# Patient Record
Sex: MALE | Race: ASIAN | Employment: OTHER | URBAN - METROPOLITAN AREA
[De-identification: names, ages, dates, MRNs, and addresses within clinical notes are randomized per-mention and may not be internally consistent; named-entity substitution may affect disease eponyms.]

---

## 2017-07-31 ENCOUNTER — LAB REQUISITION (OUTPATIENT)
Dept: LAB | Facility: HOSPITAL | Age: 62
End: 2017-07-31
Payer: COMMERCIAL

## 2017-07-31 DIAGNOSIS — E11.9 TYPE 2 DIABETES MELLITUS WITHOUT COMPLICATIONS (HCC): ICD-10-CM

## 2017-07-31 DIAGNOSIS — Z12.5 ENCOUNTER FOR SCREENING FOR MALIGNANT NEOPLASM OF PROSTATE: ICD-10-CM

## 2017-07-31 LAB
ALBUMIN SERPL BCP-MCNC: 4.3 G/DL (ref 3.5–4.8)
ALBUMIN/GLOB SERPL: 1.3 {RATIO} (ref 1–2)
ALP SERPL-CCNC: 74 U/L (ref 32–100)
ALT SERPL-CCNC: 37 U/L (ref 17–63)
ANION GAP SERPL CALC-SCNC: 10 MMOL/L (ref 0–18)
AST SERPL-CCNC: 29 U/L (ref 15–41)
BILIRUB SERPL-MCNC: 1.3 MG/DL (ref 0.3–1.2)
BUN SERPL-MCNC: 10 MG/DL (ref 8–20)
BUN/CREAT SERPL: 13.9 (ref 10–20)
CALCIUM SERPL-MCNC: 9.4 MG/DL (ref 8.5–10.5)
CHLORIDE SERPL-SCNC: 96 MMOL/L (ref 95–110)
CHOLEST SERPL-MCNC: 118 MG/DL (ref 110–200)
CO2 SERPL-SCNC: 24 MMOL/L (ref 22–32)
CREAT SERPL-MCNC: 0.72 MG/DL (ref 0.5–1.5)
GLOBULIN PLAS-MCNC: 3.4 G/DL (ref 2.5–3.7)
GLUCOSE SERPL-MCNC: 115 MG/DL (ref 70–99)
HDLC SERPL-MCNC: 35 MG/DL
LDLC SERPL CALC-MCNC: 70 MG/DL (ref 0–99)
NONHDLC SERPL-MCNC: 83 MG/DL
OSMOLALITY UR CALC.SUM OF ELEC: 270 MOSM/KG (ref 275–295)
POTASSIUM SERPL-SCNC: 4.2 MMOL/L (ref 3.3–5.1)
PROT SERPL-MCNC: 7.7 G/DL (ref 5.9–8.4)
PSA SERPL-MCNC: 0.7 NG/ML (ref 0–4)
SODIUM SERPL-SCNC: 130 MMOL/L (ref 136–144)
TRIGL SERPL-MCNC: 64 MG/DL (ref 1–149)

## 2017-07-31 PROCEDURE — 83036 HEMOGLOBIN GLYCOSYLATED A1C: CPT | Performed by: GENERAL PRACTICE

## 2017-07-31 PROCEDURE — 80053 COMPREHEN METABOLIC PANEL: CPT | Performed by: GENERAL PRACTICE

## 2017-07-31 PROCEDURE — 80061 LIPID PANEL: CPT | Performed by: GENERAL PRACTICE

## 2017-08-01 LAB — HBA1C MFR BLD: 7.8 % (ref 4–6)

## 2017-11-27 ENCOUNTER — LAB REQUISITION (OUTPATIENT)
Dept: LAB | Facility: HOSPITAL | Age: 62
End: 2017-11-27
Payer: COMMERCIAL

## 2017-11-27 DIAGNOSIS — E11.9 TYPE 2 DIABETES MELLITUS WITHOUT COMPLICATIONS (HCC): ICD-10-CM

## 2017-11-27 DIAGNOSIS — E78.5 HYPERLIPIDEMIA: ICD-10-CM

## 2017-11-27 DIAGNOSIS — Z12.5 ENCOUNTER FOR SCREENING FOR MALIGNANT NEOPLASM OF PROSTATE: ICD-10-CM

## 2017-11-27 PROCEDURE — 83036 HEMOGLOBIN GLYCOSYLATED A1C: CPT | Performed by: GENERAL PRACTICE

## 2017-11-27 PROCEDURE — 80053 COMPREHEN METABOLIC PANEL: CPT | Performed by: GENERAL PRACTICE

## 2017-11-27 PROCEDURE — 80061 LIPID PANEL: CPT | Performed by: GENERAL PRACTICE

## 2017-12-11 ENCOUNTER — TELEPHONE (OUTPATIENT)
Dept: INTERNAL MEDICINE CLINIC | Facility: CLINIC | Age: 62
End: 2017-12-11

## 2018-02-05 ENCOUNTER — OFFICE VISIT (OUTPATIENT)
Dept: INTERNAL MEDICINE CLINIC | Facility: CLINIC | Age: 63
End: 2018-02-05

## 2018-02-05 VITALS
BODY MASS INDEX: 26.65 KG/M2 | SYSTOLIC BLOOD PRESSURE: 164 MMHG | DIASTOLIC BLOOD PRESSURE: 93 MMHG | HEART RATE: 75 BPM | HEIGHT: 71 IN | TEMPERATURE: 98 F | WEIGHT: 190.38 LBS

## 2018-02-05 DIAGNOSIS — E78.5 HYPERLIPIDEMIA LDL GOAL <70: ICD-10-CM

## 2018-02-05 DIAGNOSIS — Z87.891 FORMER SMOKER: ICD-10-CM

## 2018-02-05 DIAGNOSIS — Z00.00 ANNUAL PHYSICAL EXAM: Primary | ICD-10-CM

## 2018-02-05 DIAGNOSIS — Z86.79 HX OF INTRACRANIAL HEMORRHAGE: ICD-10-CM

## 2018-02-05 DIAGNOSIS — I10 ESSENTIAL HYPERTENSION: ICD-10-CM

## 2018-02-05 PROBLEM — Z98.890 HX OF CERVICAL SPINE SURGERY: Status: ACTIVE | Noted: 2018-02-05

## 2018-02-05 PROCEDURE — 99386 PREV VISIT NEW AGE 40-64: CPT | Performed by: INTERNAL MEDICINE

## 2018-02-05 RX ORDER — ATORVASTATIN CALCIUM 40 MG/1
40 TABLET, FILM COATED ORAL NIGHTLY
Qty: 90 TABLET | Refills: 1 | Status: SHIPPED | OUTPATIENT
Start: 2018-02-05 | End: 2018-04-16

## 2018-02-05 NOTE — PROGRESS NOTES
REASON FOR VISIT:    Rachel Fields is a 58year old male who presents for an [address].   Patient is a 71-year-old male here as a new patient for annual physical. He has hx of NIDDM for about 10 years, hx of ICH due to to injury- 1979 wit CHOLEST 119 11/27/2017   CHOLEST 118 07/31/2017   CHOLEST 138 11/14/2016       Lab Results  Component Value Date   HDL 39 11/27/2017   HDL 35 07/31/2017   HDL 35 11/14/2016       Lab Results  Component Value Date   TRIG 93 11/27/2017   TRIG 64 07/31/2017 found for this or any previous visit. Fecal Occult Blood  Annually No results found for: FOB, OCCULTSTOOL    Obesity Screening Screen all adults annually Body mass index is 26.56 kg/m².       Preventive Services for Which Recommendations Vary with Risk R SOCIAL HISTORY:   Smoking status: Former Smoker                                                              Packs/day: 0.00      Years: 30.00        Types: Cigarettes  Smokeless tobacco: Never Used                      Alcohol use: Yes           0.0 oz/ RELEVANT CHRONIC CONDITIONS:   Marie Trimble is a 58year old male who presents for an [address].      PLAN SUMMARY:   Diagnoses and all orders for this visit:    Annual physical   The blood work for hemoglobin A1c, lipid panel, CMP and P every 10 years   HPV Males 11-21   Zoster (Shingles) 60 and older: one dose   Varicella 2 doses if not immune   MMR 1-2 doses if born after 1956 and not immune

## 2018-03-27 ENCOUNTER — LAB ENCOUNTER (OUTPATIENT)
Dept: LAB | Age: 63
End: 2018-03-27
Attending: INTERNAL MEDICINE
Payer: COMMERCIAL

## 2018-03-27 DIAGNOSIS — Z00.00 ANNUAL PHYSICAL EXAM: ICD-10-CM

## 2018-03-27 LAB
BASOPHILS # BLD: 0.1 K/UL (ref 0–0.2)
BASOPHILS NFR BLD: 1 %
CREAT UR-MCNC: 140.7 MG/DL
EOSINOPHIL # BLD: 0.6 K/UL (ref 0–0.7)
EOSINOPHIL NFR BLD: 6 %
ERYTHROCYTE [DISTWIDTH] IN BLOOD BY AUTOMATED COUNT: 13.3 % (ref 11–15)
HBA1C MFR BLD: 7.2 % (ref 4–6)
HCT VFR BLD AUTO: 37.7 % (ref 41–52)
HGB BLD-MCNC: 13.3 G/DL (ref 13.5–17.5)
LYMPHOCYTES # BLD: 2.4 K/UL (ref 1–4)
LYMPHOCYTES NFR BLD: 21 %
MCH RBC QN AUTO: 29.4 PG (ref 27–32)
MCHC RBC AUTO-ENTMCNC: 35.2 G/DL (ref 32–37)
MCV RBC AUTO: 83.6 FL (ref 80–100)
MICROALBUMIN UR-MCNC: 0.7 MG/DL (ref 0–1.8)
MICROALBUMIN/CREAT UR: 5 MG/G{CREAT} (ref 0–20)
MONOCYTES # BLD: 0.8 K/UL (ref 0–1)
MONOCYTES NFR BLD: 7 %
NEUTROPHILS # BLD AUTO: 7.5 K/UL (ref 1.8–7.7)
NEUTROPHILS NFR BLD: 66 %
PLATELET # BLD AUTO: 321 K/UL (ref 140–400)
PMV BLD AUTO: 6.9 FL (ref 7.4–10.3)
RBC # BLD AUTO: 4.51 M/UL (ref 4.5–5.9)
TSH SERPL-ACNC: 3 UIU/ML (ref 0.45–5.33)
WBC # BLD AUTO: 11.4 K/UL (ref 4–11)

## 2018-03-27 PROCEDURE — 82570 ASSAY OF URINE CREATININE: CPT

## 2018-03-27 PROCEDURE — 82306 VITAMIN D 25 HYDROXY: CPT

## 2018-03-27 PROCEDURE — 36415 COLL VENOUS BLD VENIPUNCTURE: CPT

## 2018-03-27 PROCEDURE — 84443 ASSAY THYROID STIM HORMONE: CPT

## 2018-03-27 PROCEDURE — 83036 HEMOGLOBIN GLYCOSYLATED A1C: CPT

## 2018-03-27 PROCEDURE — 85025 COMPLETE CBC W/AUTO DIFF WBC: CPT

## 2018-03-27 PROCEDURE — 82043 UR ALBUMIN QUANTITATIVE: CPT

## 2018-03-28 LAB — 25(OH)D3 SERPL-MCNC: 21.5 NG/ML

## 2018-03-29 ENCOUNTER — TELEPHONE (OUTPATIENT)
Dept: INTERNAL MEDICINE CLINIC | Facility: CLINIC | Age: 63
End: 2018-03-29

## 2018-03-29 DIAGNOSIS — E11.9 TYPE 2 DIABETES MELLITUS WITHOUT COMPLICATION, WITHOUT LONG-TERM CURRENT USE OF INSULIN (HCC): Primary | ICD-10-CM

## 2018-03-29 NOTE — TELEPHONE ENCOUNTER
Tried calling the patient again , no answer. IT was to discuss the results. Hb A1 C was slightly high.  I see she has an up coming appoinment, Can discuss then

## 2018-04-02 ENCOUNTER — OFFICE VISIT (OUTPATIENT)
Dept: INTERNAL MEDICINE CLINIC | Facility: CLINIC | Age: 63
End: 2018-04-02

## 2018-04-02 ENCOUNTER — TELEPHONE (OUTPATIENT)
Dept: DERMATOLOGY CLINIC | Facility: CLINIC | Age: 63
End: 2018-04-02

## 2018-04-02 VITALS
HEIGHT: 71 IN | WEIGHT: 187.38 LBS | SYSTOLIC BLOOD PRESSURE: 161 MMHG | BODY MASS INDEX: 26.23 KG/M2 | DIASTOLIC BLOOD PRESSURE: 80 MMHG | TEMPERATURE: 98 F

## 2018-04-02 DIAGNOSIS — R21 RASH: Primary | ICD-10-CM

## 2018-04-02 DIAGNOSIS — I10 ESSENTIAL HYPERTENSION: ICD-10-CM

## 2018-04-02 PROCEDURE — 99212 OFFICE O/P EST SF 10 MIN: CPT | Performed by: INTERNAL MEDICINE

## 2018-04-02 PROCEDURE — 99214 OFFICE O/P EST MOD 30 MIN: CPT | Performed by: INTERNAL MEDICINE

## 2018-04-02 RX ORDER — LISINOPRIL 20 MG/1
20 TABLET ORAL DAILY
Qty: 30 TABLET | Refills: 3 | Status: SHIPPED | OUTPATIENT
Start: 2018-04-02 | End: 2018-08-30

## 2018-04-02 NOTE — TELEPHONE ENCOUNTER
s/w pt's spouse. Pt with dry, red, itchy rash to vanessa. Legs, hands and abdomen x1 month. Spouse wanted an appt next Monday. She refused any appt this week. No openings next Monday.  Appt made for 4/16/18 and she is ok calling to check if there are any cancel

## 2018-04-02 NOTE — PROGRESS NOTES
Jorge Norris is a 58year old male. Patient presents with:  Rash  Abnormal Labs      HPI:     Rash   This is a new problem. The current episode started more than 1 month ago. The problem has been gradually worsening since onset.  The affected locati base of nose / L medial eye area   • Other and unspecified hyperlipidemia       Past Surgical History:   Procedure Laterality Date   • Other surgical history        Social History:  Smoking status: Former Smoker Eat less of canned , frozen, dried, packaged and fast food. Limit caffeine, alcohol. No smoking. Exercise regularly, at least 20 minutes 3 times a week. Maintain healthy body weight. Check BP regularly. Avoid stress.   Lifestyle modification has poten

## 2018-04-02 NOTE — ASSESSMENT & PLAN NOTE
Rash appearing like tinea corporis. Giving miconazole cream.  Derm referral given. Can take Benadryl at night for severe itching.

## 2018-04-02 NOTE — TELEPHONE ENCOUNTER
Requesting appt for rash--all over body for the past month. Rash on leg is getting much worse. Pt can only come for appt on Monday. Spouse declined 04/16 appt--too far out. Requesting call back.

## 2018-04-02 NOTE — ASSESSMENT & PLAN NOTE
BP elevated. Stage 2 HTN. Starting lisinopril 20 mg especially given diabetes. Labs normal.  Advised low salt diet. Eat less of canned , frozen, dried, packaged and fast food. Limit caffeine, alcohol. No smoking.   Exercise regularly, at least 20 minut

## 2018-04-16 ENCOUNTER — OFFICE VISIT (OUTPATIENT)
Dept: INTERNAL MEDICINE CLINIC | Facility: CLINIC | Age: 63
End: 2018-04-16

## 2018-04-16 ENCOUNTER — OFFICE VISIT (OUTPATIENT)
Dept: DERMATOLOGY CLINIC | Facility: CLINIC | Age: 63
End: 2018-04-16

## 2018-04-16 VITALS
DIASTOLIC BLOOD PRESSURE: 77 MMHG | TEMPERATURE: 98 F | BODY MASS INDEX: 26.32 KG/M2 | WEIGHT: 188 LBS | SYSTOLIC BLOOD PRESSURE: 129 MMHG | HEIGHT: 71 IN | HEART RATE: 74 BPM

## 2018-04-16 DIAGNOSIS — I10 ESSENTIAL HYPERTENSION: ICD-10-CM

## 2018-04-16 DIAGNOSIS — Z79.4 TYPE 2 DIABETES MELLITUS WITHOUT COMPLICATION, WITH LONG-TERM CURRENT USE OF INSULIN (HCC): Primary | ICD-10-CM

## 2018-04-16 DIAGNOSIS — L30.9 DERMATITIS: ICD-10-CM

## 2018-04-16 DIAGNOSIS — Z85.828 HISTORY OF SKIN CANCER: ICD-10-CM

## 2018-04-16 DIAGNOSIS — R06.00 EXERTIONAL DYSPNEA: ICD-10-CM

## 2018-04-16 DIAGNOSIS — L72.3 SEBACEOUS CYST: ICD-10-CM

## 2018-04-16 DIAGNOSIS — L28.0 LSC (LICHEN SIMPLEX CHRONICUS): ICD-10-CM

## 2018-04-16 DIAGNOSIS — L02.91 ABSCESS: Primary | ICD-10-CM

## 2018-04-16 DIAGNOSIS — E11.9 TYPE 2 DIABETES MELLITUS WITHOUT COMPLICATION, WITH LONG-TERM CURRENT USE OF INSULIN (HCC): Primary | ICD-10-CM

## 2018-04-16 PROCEDURE — 99212 OFFICE O/P EST SF 10 MIN: CPT | Performed by: DERMATOLOGY

## 2018-04-16 PROCEDURE — 99212 OFFICE O/P EST SF 10 MIN: CPT | Performed by: INTERNAL MEDICINE

## 2018-04-16 PROCEDURE — 99214 OFFICE O/P EST MOD 30 MIN: CPT | Performed by: DERMATOLOGY

## 2018-04-16 PROCEDURE — 99214 OFFICE O/P EST MOD 30 MIN: CPT | Performed by: INTERNAL MEDICINE

## 2018-04-16 RX ORDER — ATORVASTATIN CALCIUM 40 MG/1
40 TABLET, FILM COATED ORAL NIGHTLY
Qty: 90 TABLET | Refills: 1 | Status: SHIPPED | OUTPATIENT
Start: 2018-04-16 | End: 2019-04-11

## 2018-04-16 RX ORDER — CEPHALEXIN 500 MG/1
500 CAPSULE ORAL 2 TIMES DAILY
Qty: 14 CAPSULE | Refills: 0 | Status: SHIPPED | OUTPATIENT
Start: 2018-04-16 | End: 2018-04-16 | Stop reason: ALTCHOICE

## 2018-04-16 NOTE — ASSESSMENT & PLAN NOTE
Hemoglobin A1c in March was 7.2. It was 7.1 in November 2017. He is on metformin 500 mg twice a day. Continue atorvastatin 40 mg, lisinopril 20 and aspirin 81. Foot exam done on April 4, 2018. Diabetic eye referral given. Recheck A1c in 3 months.   Av

## 2018-04-16 NOTE — ASSESSMENT & PLAN NOTE
On atorvastatin 40 mg. Continue present management.   Recommended regular exercise, weight loss and low-fat diet

## 2018-04-16 NOTE — ASSESSMENT & PLAN NOTE
Being treated with triamcinolone 0.1% and cephalexin 500 mg for 7 days by Derm. Possible surgery referral if does not improve for the groin rash.

## 2018-04-16 NOTE — ASSESSMENT & PLAN NOTE
Blood pressure improved today. On lisinopril 20mg. Mucus and management. Avoid stress, limit salt intake. Limit sodium intake to less than 2 g per day.   Advised regular exercise, maintaining a healthy weight and healthy diet

## 2018-04-16 NOTE — PROGRESS NOTES
Gely Ozuna is a 58year old male. Patient presents with:  Hypertension  Diabetes      HPI:     HPI  Patient is here for discussing blood pressure and diabetes. His blood pressure was very elevated the last visit.   Reports he has been checking it Social History:  Smoking status: Former Smoker                                                              Packs/day: 0.00      Years: 30.00        Types: Cigarettes  Smokeless tobacco: Never Used                      Alcohol use: Yes           0.0 oz/wee Cardiovascular    Essential hypertension     Blood pressure improved today. On lisinopril 20mg. Mucus and management. Avoid stress, limit salt intake. Limit sodium intake to less than 2 g per day.   Advised regular exercise, maintaining a healthy we

## 2018-04-29 NOTE — PROGRESS NOTES
John Dennis is a 58year old male. Patient presents with:  Rash: LOV- 1-2-15.  Pt presenting today with rash to bilateral legs hands, and abdomen x 1 month that is itchy f/u with PCP and was given miconazole cream that used for 1 week with no i Rfl: 1   atorvastatin 40 MG Oral Tab Take 1 tablet (40 mg total) by mouth nightly. Disp: 90 tablet Rfl: 1   Miconazole Nitrate 2 % External Cream Apply 1 Application topically 2 (two) times daily.  Disp: 1 Tube Rfl: 3     Allergies:   No Known Allergies other skin complaints. History, medications, allergies as noted. Nothing new or different no unusual exposures. No changes in soaps, detergents, skin care products. No recent travel. No else at home itching. No recent illnesses.        ROS:   Garth year.    No orders of the defined types were placed in this encounter. Meds & Refills for this Visit:   Signed Prescriptions Disp Refills    triamcinolone acetonide 0.1 % External Cream 80 g 2      Sig: Apply topically 2 (two) times daily.  Apply bid

## 2018-05-15 ENCOUNTER — APPOINTMENT (OUTPATIENT)
Dept: OCCUPATIONAL MEDICINE | Age: 63
End: 2018-05-15
Attending: EMERGENCY MEDICINE

## 2018-05-21 ENCOUNTER — APPOINTMENT (OUTPATIENT)
Dept: OCCUPATIONAL MEDICINE | Age: 63
End: 2018-05-21
Attending: EMERGENCY MEDICINE

## 2018-05-21 ENCOUNTER — TELEPHONE (OUTPATIENT)
Dept: INTERNAL MEDICINE CLINIC | Facility: CLINIC | Age: 63
End: 2018-05-21

## 2018-05-21 DIAGNOSIS — L30.9 DERMATITIS: Primary | ICD-10-CM

## 2018-05-21 RX ORDER — CEPHALEXIN 500 MG/1
500 CAPSULE ORAL 2 TIMES DAILY
Qty: 14 CAPSULE | Refills: 0 | Status: SHIPPED | OUTPATIENT
Start: 2018-05-21 | End: 2018-05-28

## 2018-08-07 ENCOUNTER — OFFICE VISIT (OUTPATIENT)
Dept: INTERNAL MEDICINE CLINIC | Facility: CLINIC | Age: 63
End: 2018-08-07

## 2018-08-07 VITALS
HEART RATE: 80 BPM | BODY MASS INDEX: 25.83 KG/M2 | HEIGHT: 71 IN | WEIGHT: 184.5 LBS | DIASTOLIC BLOOD PRESSURE: 70 MMHG | TEMPERATURE: 98 F | SYSTOLIC BLOOD PRESSURE: 138 MMHG

## 2018-08-07 DIAGNOSIS — M25.551 BILATERAL HIP PAIN: Primary | ICD-10-CM

## 2018-08-07 DIAGNOSIS — M25.552 BILATERAL HIP PAIN: Primary | ICD-10-CM

## 2018-08-07 DIAGNOSIS — L73.9 FOLLICULITIS: ICD-10-CM

## 2018-08-07 PROCEDURE — 99214 OFFICE O/P EST MOD 30 MIN: CPT | Performed by: INTERNAL MEDICINE

## 2018-08-07 PROCEDURE — 99212 OFFICE O/P EST SF 10 MIN: CPT | Performed by: INTERNAL MEDICINE

## 2018-08-07 RX ORDER — MELOXICAM 15 MG/1
15 TABLET ORAL DAILY
Qty: 20 TABLET | Refills: 0 | Status: SHIPPED | OUTPATIENT
Start: 2018-08-07 | End: 2018-08-27 | Stop reason: ALTCHOICE

## 2018-08-07 RX ORDER — DOXYCYCLINE HYCLATE 100 MG/1
100 CAPSULE ORAL 2 TIMES DAILY
Qty: 14 CAPSULE | Refills: 0 | Status: SHIPPED | OUTPATIENT
Start: 2018-08-07 | End: 2018-08-14

## 2018-08-07 RX ORDER — CYCLOBENZAPRINE HCL 10 MG
10 TABLET ORAL NIGHTLY
Qty: 20 TABLET | Refills: 0 | Status: SHIPPED | OUTPATIENT
Start: 2018-08-07 | End: 2018-08-27 | Stop reason: ALTCHOICE

## 2018-08-07 NOTE — ASSESSMENT & PLAN NOTE
Folliculitis of the groin. Patient is immunocompromised with uncontrolled diabetes 7.2 of hemoglobin A1c in March. Treating with doxycycline 100 mg twice daily for 7 days. Follow-up if the symptoms fail to improve.   Covered with nonadhesive bandage and

## 2018-08-07 NOTE — PROGRESS NOTES
Holden Valdez is a 61year old male. Patient presents with:  Hip Pain: right hip  Wound: groin area      HPI:     HPI  Here for above. B/l hip pain for 1.5 months   Pain feels like soreness and it is 8/10 intensity when worse.  Reports walking, st mg total) by mouth daily. For high blood pressure Disp: 30 tablet Rfl: 3   ASPIRIN EC LOW DOSE 81 MG Oral Tab EC 1 tablet daily.  Disp:  Rfl: 1      Past Medical History:   Diagnosis Date   • Basal cell adenoma 2014    base of nose / L medial eye area   • O He exhibits normal range of motion, normal strength, no tenderness, no bony tenderness, no swelling, no crepitus, no deformity and no laceration. Neurological: He is alert and oriented to person, place, and time. He has normal reflexes.    Skin:   Follicu tablet (15 mg total) by mouth daily.  -     Doxycycline Hyclate 100 MG Oral Cap; Take 1 capsule (100 mg total) by mouth 2 (two) times daily. The patient indicates understanding of these issues and agrees to the plan.               Tiffanie Barrett MD

## 2018-08-07 NOTE — ASSESSMENT & PLAN NOTE
Chronic, recent exacerbation. Advised to avoid smoking as it is a clear trigger. Reviewed MRI from 2016. No evidence of avascular necrosis are osteonecrosis. Notable for mild osteoarthritis.   Had seen Dr. Anna Quintero in the past.  Giving cyclobenzaprine

## 2018-08-27 ENCOUNTER — OFFICE VISIT (OUTPATIENT)
Dept: INTERNAL MEDICINE CLINIC | Facility: CLINIC | Age: 63
End: 2018-08-27

## 2018-08-27 VITALS
BODY MASS INDEX: 25.55 KG/M2 | WEIGHT: 182.5 LBS | HEIGHT: 71 IN | TEMPERATURE: 98 F | SYSTOLIC BLOOD PRESSURE: 166 MMHG | DIASTOLIC BLOOD PRESSURE: 90 MMHG | HEART RATE: 86 BPM

## 2018-08-27 DIAGNOSIS — Z91.89 AT HIGH RISK FOR OSTEOPOROSIS: ICD-10-CM

## 2018-08-27 DIAGNOSIS — M89.8X9 BONE PAIN: ICD-10-CM

## 2018-08-27 DIAGNOSIS — M25.551 BILATERAL HIP PAIN: Primary | ICD-10-CM

## 2018-08-27 DIAGNOSIS — I10 ESSENTIAL HYPERTENSION: ICD-10-CM

## 2018-08-27 DIAGNOSIS — M25.552 BILATERAL HIP PAIN: Primary | ICD-10-CM

## 2018-08-27 DIAGNOSIS — Z86.69 HISTORY OF PARALYSIS: ICD-10-CM

## 2018-08-27 PROCEDURE — 99212 OFFICE O/P EST SF 10 MIN: CPT | Performed by: INTERNAL MEDICINE

## 2018-08-27 PROCEDURE — 99214 OFFICE O/P EST MOD 30 MIN: CPT | Performed by: INTERNAL MEDICINE

## 2018-08-27 RX ORDER — CYCLOBENZAPRINE HCL 10 MG
10 TABLET ORAL NIGHTLY
Qty: 20 TABLET | Refills: 0 | Status: SHIPPED | OUTPATIENT
Start: 2018-08-27 | End: 2018-09-26

## 2018-08-27 RX ORDER — MELOXICAM 15 MG/1
15 TABLET ORAL DAILY
Qty: 30 TABLET | Refills: 0 | Status: SHIPPED | OUTPATIENT
Start: 2018-08-27 | End: 2018-08-28

## 2018-08-27 NOTE — ASSESSMENT & PLAN NOTE
Chronic, recent exacerbation. Advised to avoid smoking as it is a clear trigger. Reviewed MRI from 2016. No evidence of avascular necrosis are osteonecrosis. Notable for mild osteoarthritis.   Had seen Dr. Ashlie Ricks in the past.  Giving cyclobenzaprine

## 2018-08-27 NOTE — PROGRESS NOTES
Angela Hernandez is a 61year old male. Patient presents with:  Hip Pain: right hip      HPI:     HPI  Patient is here for above. He is here for three-week follow-up of bilateral hip pain, worse on right side.   Pain has been going on for more than 2 m cell adenoma 2014    base of nose / L medial eye area   • Other and unspecified hyperlipidemia       Past Surgical History:   Procedure Laterality Date   • Other surgical history        Social History:  Smoking status: Current Every Day Smoker ASSESSMENT AND PLAN:     Problem List Items Addressed This Visit        Cardiovascular    Essential hypertension     Bp elevated today. Reports home blood pressure readings are usually less than 130/80.   Advised to cut down salt, exercise regularl

## 2018-08-27 NOTE — ASSESSMENT & PLAN NOTE
Bp elevated today. Reports home blood pressure readings are usually less than 130/80. Advised to cut down salt, exercise regularly and keep a log of blood pressure for next visit. Continue lisinopril 20 mg.

## 2018-08-28 RX ORDER — MELOXICAM 15 MG/1
15 TABLET ORAL DAILY
Qty: 30 TABLET | Refills: 0 | Status: SHIPPED | OUTPATIENT
Start: 2018-08-28 | End: 2018-10-23

## 2018-08-28 NOTE — TELEPHONE ENCOUNTER
Please advise in regards to refill request. Thank You    Refill Protocol Appointment Criteria  · Appointment scheduled in the past 6 months or in the next 3 months  Recent Outpatient Visits            Yesterday Bilateral hip pain    [facility], [facility], [address] [place] [address]

## 2018-08-30 ENCOUNTER — HOSPITAL ENCOUNTER (OUTPATIENT)
Dept: GENERAL RADIOLOGY | Facility: HOSPITAL | Age: 63
Discharge: HOME | End: 2018-08-30
Attending: INTERNAL MEDICINE
Payer: COMMERCIAL

## 2018-08-30 ENCOUNTER — OFFICE VISIT (OUTPATIENT)
Dept: INTERNAL MEDICINE CLINIC | Facility: CLINIC | Age: 63
End: 2018-08-30

## 2018-08-30 VITALS
WEIGHT: 184.5 LBS | DIASTOLIC BLOOD PRESSURE: 78 MMHG | HEART RATE: 77 BPM | SYSTOLIC BLOOD PRESSURE: 161 MMHG | HEIGHT: 71 IN | TEMPERATURE: 98 F | BODY MASS INDEX: 25.83 KG/M2

## 2018-08-30 DIAGNOSIS — M25.551 BILATERAL HIP PAIN: ICD-10-CM

## 2018-08-30 DIAGNOSIS — M25.552 BILATERAL HIP PAIN: Primary | ICD-10-CM

## 2018-08-30 DIAGNOSIS — M25.551 BILATERAL HIP PAIN: Primary | ICD-10-CM

## 2018-08-30 DIAGNOSIS — I10 ESSENTIAL HYPERTENSION: ICD-10-CM

## 2018-08-30 DIAGNOSIS — M25.552 BILATERAL HIP PAIN: ICD-10-CM

## 2018-08-30 DIAGNOSIS — L73.9 FOLLICULITIS: ICD-10-CM

## 2018-08-30 PROCEDURE — 99214 OFFICE O/P EST MOD 30 MIN: CPT | Performed by: INTERNAL MEDICINE

## 2018-08-30 PROCEDURE — 73502 X-RAY EXAM HIP UNI 2-3 VIEWS: CPT | Performed by: INTERNAL MEDICINE

## 2018-08-30 PROCEDURE — 99212 OFFICE O/P EST SF 10 MIN: CPT | Performed by: INTERNAL MEDICINE

## 2018-08-30 RX ORDER — HYDROCODONE BITARTRATE AND ACETAMINOPHEN 7.5; 325 MG/1; MG/1
1 TABLET ORAL EVERY 8 HOURS PRN
Qty: 45 TABLET | Refills: 0 | Status: SHIPPED | OUTPATIENT
Start: 2018-08-30 | End: 2018-10-23

## 2018-08-30 RX ORDER — HYDROCODONE BITARTRATE AND ACETAMINOPHEN 7.5; 325 MG/1; MG/1
1 TABLET ORAL EVERY 8 HOURS PRN
Qty: 45 TABLET | Refills: 0 | Status: SHIPPED | OUTPATIENT
Start: 2018-08-30 | End: 2018-08-30

## 2018-08-30 RX ORDER — LISINOPRIL 20 MG/1
20 TABLET ORAL DAILY
Qty: 90 TABLET | Refills: 1 | Status: SHIPPED | OUTPATIENT
Start: 2018-08-30 | End: 2019-06-13

## 2018-08-30 RX ORDER — METHYLPREDNISOLONE 4 MG/1
TABLET ORAL
Qty: 1 KIT | Refills: 0 | Status: SHIPPED | OUTPATIENT
Start: 2018-08-30 | End: 2018-10-23

## 2018-08-30 RX ORDER — CLINDAMYCIN PHOSPHATE 10 MG/G
1 GEL TOPICAL 2 TIMES DAILY
Qty: 30 G | Refills: 1 | Status: SHIPPED | OUTPATIENT
Start: 2018-08-30 | End: 2018-09-06

## 2018-08-30 NOTE — PROGRESS NOTES
Oly Boyle is a 61year old male. Patient presents with:  Hip Pain      HPI:     HPI  Here for above. B/l hip pain for 1.5 months   Patient having greater difficulty. No improvement with medication.   Having difficulty to walk, he has been dutta Disp: 30 tablet Rfl: 0   Cyclobenzaprine HCl 10 MG Oral Tab Take 1 tablet (10 mg total) by mouth nightly. As needed for muscle relaxation. Can make tired.  Disp: 20 tablet Rfl: 0   METFORMIN  MG Oral Tab TAKE 1 TABLET BY MOUTH TWICE DAILY WITH MEAL D adult)   Pulse 77   Temp 97.8 °F (36.6 °C) (Oral)   Ht 5' 11\" (1.803 m)   Wt 184 lb 8 oz (83.7 kg)   BMI 25.73 kg/m²   Physical Exam   Constitutional: He is oriented to person, place, and time. Musculoskeletal: He exhibits no edema or tenderness.

## 2018-08-30 NOTE — ASSESSMENT & PLAN NOTE
Chronic, recent exacerbation. ? Acute right-sided trochanteric bursitis. Reviewed MRI from 2016. No evidence of avascular necrosis are osteonecrosis. Notable for mild osteoarthritis. Follow-up with orthopedics-Dr. Elida Smith as scheduled.   Giving [facility]

## 2018-09-04 ENCOUNTER — HOSPITAL ENCOUNTER (OUTPATIENT)
Dept: BONE DENSITY | Age: 63
Discharge: HOME | End: 2018-09-04
Attending: INTERNAL MEDICINE
Payer: COMMERCIAL

## 2018-09-04 DIAGNOSIS — M89.8X9 BONE PAIN: ICD-10-CM

## 2018-09-04 DIAGNOSIS — M25.552 BILATERAL HIP PAIN: ICD-10-CM

## 2018-09-04 DIAGNOSIS — Z86.69 HISTORY OF PARALYSIS: ICD-10-CM

## 2018-09-04 DIAGNOSIS — Z91.89 AT HIGH RISK FOR OSTEOPOROSIS: ICD-10-CM

## 2018-09-04 DIAGNOSIS — M25.551 BILATERAL HIP PAIN: ICD-10-CM

## 2018-09-04 PROCEDURE — 77080 DXA BONE DENSITY AXIAL: CPT | Performed by: INTERNAL MEDICINE

## 2018-09-05 ENCOUNTER — TELEPHONE (OUTPATIENT)
Dept: INTERNAL MEDICINE CLINIC | Facility: CLINIC | Age: 63
End: 2018-09-05

## 2018-09-08 ENCOUNTER — TELEPHONE (OUTPATIENT)
Dept: OTHER | Age: 63
End: 2018-09-08

## 2018-09-08 ENCOUNTER — LAB REQUISITION (OUTPATIENT)
Dept: LAB | Facility: HOSPITAL | Age: 63
End: 2018-09-08
Payer: COMMERCIAL

## 2018-09-08 DIAGNOSIS — L72.0 EPIDERMAL CYST: ICD-10-CM

## 2018-09-08 PROCEDURE — 87205 SMEAR GRAM STAIN: CPT | Performed by: DERMATOLOGY

## 2018-09-08 PROCEDURE — 87186 SC STD MICRODIL/AGAR DIL: CPT | Performed by: DERMATOLOGY

## 2018-09-08 PROCEDURE — 87077 CULTURE AEROBIC IDENTIFY: CPT | Performed by: DERMATOLOGY

## 2018-09-08 PROCEDURE — 87070 CULTURE OTHR SPECIMN AEROBIC: CPT | Performed by: DERMATOLOGY

## 2018-09-08 NOTE — TELEPHONE ENCOUNTER
Prior authorization for Hydrocodone-Acetaminophen 7.5-325MG oral tab completed w/ [employer] on cover my meds Key:[id number]  , turn around time 3-5 days.

## 2018-09-10 NOTE — TELEPHONE ENCOUNTER
PA approved effective 09/07/2018--12/07/2018 up to 45 tablets per 15 days. Pharmacy notified and patient notified via [facility] [address].

## 2018-09-11 ENCOUNTER — OFFICE VISIT (OUTPATIENT)
Dept: ORTHOPEDICS CLINIC | Facility: CLINIC | Age: 63
End: 2018-09-11

## 2018-09-11 ENCOUNTER — HOSPITAL ENCOUNTER (OUTPATIENT)
Dept: GENERAL RADIOLOGY | Facility: HOSPITAL | Age: 63
Discharge: HOME | End: 2018-09-11
Attending: ORTHOPAEDIC SURGERY
Payer: COMMERCIAL

## 2018-09-11 ENCOUNTER — TELEPHONE (OUTPATIENT)
Dept: INTERNAL MEDICINE CLINIC | Facility: CLINIC | Age: 63
End: 2018-09-11

## 2018-09-11 DIAGNOSIS — M25.551 BILATERAL HIP PAIN: Primary | ICD-10-CM

## 2018-09-11 DIAGNOSIS — M51.36 DDD (DEGENERATIVE DISC DISEASE), LUMBAR: Primary | ICD-10-CM

## 2018-09-11 DIAGNOSIS — M25.552 BILATERAL HIP PAIN: Primary | ICD-10-CM

## 2018-09-11 DIAGNOSIS — M54.10 RADICULAR LEG PAIN: ICD-10-CM

## 2018-09-11 DIAGNOSIS — M79.604 RIGHT LEG PAIN: ICD-10-CM

## 2018-09-11 PROCEDURE — 72100 X-RAY EXAM L-S SPINE 2/3 VWS: CPT | Performed by: ORTHOPAEDIC SURGERY

## 2018-09-11 PROCEDURE — 99212 OFFICE O/P EST SF 10 MIN: CPT | Performed by: ORTHOPAEDIC SURGERY

## 2018-09-11 PROCEDURE — 99243 OFF/OP CNSLTJ NEW/EST LOW 30: CPT | Performed by: ORTHOPAEDIC SURGERY

## 2018-09-11 NOTE — PROGRESS NOTES
9/11/2018  [facility]  5/31/1955  61year old   male  Lara Sanderson MD    HPI:   Patient presents with:  Hip Pain: Bilateral - onset 3 weeks ago - no injury - has pain in the R buttock with radiation down the leg , has pain on the lateral of 180 tablet Rfl: 0   triamcinolone acetonide 0.1 % External Cream Apply topically 2 (two) times daily. Apply bid Disp: 80 g Rfl: 2   atorvastatin 40 MG Oral Tab Take 1 tablet (40 mg total) by mouth nightly.  Disp: 90 tablet Rfl: 1   ASPIRIN EC LOW DOSE 81 MG TESTING:  Plain films of the lumbar spine reveals degenerative disc disease. Plain films of bilateral hips taken prior to this visit reveals relative preservation of the femoral acetabular joints bilaterally.     ASSESSMENT AND PLAN:   Ddd (degenerative di

## 2018-09-11 NOTE — TELEPHONE ENCOUNTER
Patient came to drop off 2 sets of form FMLA certifications and Short Term Disability. Payment was NOT collected. HIPAA form was completed.

## 2018-09-11 NOTE — TELEPHONE ENCOUNTER
FMLA and Disability forms for Dr. Negrito Paz received in [employer] Signed release, need to inform pt that charge is $50 (2 forms). Logged for processing.  NK

## 2018-09-18 ENCOUNTER — APPOINTMENT (OUTPATIENT)
Dept: PHYSICAL THERAPY | Age: 63
End: 2018-09-18
Attending: ORTHOPAEDIC SURGERY
Payer: COMMERCIAL

## 2018-09-19 NOTE — TELEPHONE ENCOUNTER
Diabetes Medications  Protocol Criteria:  · Appointment scheduled in the past 6 months or the next 3 months  · A1C < 7.5 in the past 6 months  · Creatinine in the past 12 months  · Creatinine result < 1.5   Recent Outpatient Visits            1 week ago DD visits    In 3 weeks Con BELLO Campa in [facility]     In 4 weeks BELLO Peoples in [facility]     In 1 month Jacinda Parish MD [facility], [address], [facility]

## 2018-09-20 ENCOUNTER — APPOINTMENT (OUTPATIENT)
Dept: PHYSICAL THERAPY | Age: 63
End: 2018-09-20
Attending: ORTHOPAEDIC SURGERY
Payer: COMMERCIAL

## 2018-09-20 NOTE — TELEPHONE ENCOUNTER
Form completed drw    Revised by NK one addl day,  Faxed and copies provided to pt at  [facility] at [facility].   YOHANNES

## 2018-09-20 NOTE — TELEPHONE ENCOUNTER
Good morning Dr. Shea Salter,      2 forms FMLA and ST disability. Please sign both. If you need help I will be at the [facility] this afternoon. Both for a consecutive leave until he is relaesd by you or Dr. Cecelia Quinteros.        Please sign off on form:  -Highlight the

## 2018-09-25 ENCOUNTER — OFFICE VISIT (OUTPATIENT)
Dept: PHYSICAL THERAPY | Age: 63
End: 2018-09-25
Attending: ORTHOPAEDIC SURGERY
Payer: COMMERCIAL

## 2018-09-25 DIAGNOSIS — M54.10 RADICULAR LEG PAIN: ICD-10-CM

## 2018-09-25 DIAGNOSIS — M51.36 DDD (DEGENERATIVE DISC DISEASE), LUMBAR: ICD-10-CM

## 2018-09-25 PROCEDURE — 97110 THERAPEUTIC EXERCISES: CPT

## 2018-09-25 PROCEDURE — 97162 PT EVAL MOD COMPLEX 30 MIN: CPT

## 2018-09-25 NOTE — PROGRESS NOTES
P.T. EVALUATION:   Referring Physician: Dr. Matthew Phipps  Diagnosis: DDD (degenerative disc disease), lumbar (M51.36)  Radicular leg pain (M54.10)  Date of Onset: September 2018 Date of Service: 9/25/2018     PATIENT SUMMARY   Yan Vazquez is a 61 year min loss, L WNL    Strength/MMT:   Hip flx: R 3+/5, L 4+/5  Hip abd: R unable 2' pain, L 4+/5  Hip ext: R 3-/5, L 5/5  Knee ext: R 3+/5, L 5/5  Knee flx: R 4-/5, L 5/5  Ankle df: R 4+/5, L 5/5  Ankle pf: not tested    Special tests:   SLR +    Posture: for PT    [de-identified] certification required: Yes  I certify the need for these services furnished under this plan of treatment and while under my care.     X___________________________________________________ Date____________________    Certification From: 9/2

## 2018-09-26 ENCOUNTER — OFFICE VISIT (OUTPATIENT)
Dept: PHYSICAL THERAPY | Age: 63
End: 2018-09-26
Attending: ORTHOPAEDIC SURGERY
Payer: COMMERCIAL

## 2018-09-26 DIAGNOSIS — M51.36 DDD (DEGENERATIVE DISC DISEASE), LUMBAR: ICD-10-CM

## 2018-09-26 DIAGNOSIS — M54.10 RADICULAR LEG PAIN: ICD-10-CM

## 2018-09-26 PROCEDURE — 97110 THERAPEUTIC EXERCISES: CPT

## 2018-09-26 NOTE — PROGRESS NOTES
Diagnosis:  DDD (degenerative disc disease), lumbar (M51.36)  Radicular leg pain (M54.10)              Next MD visit: none scheduled  Fall Risk: standard         Precautions: n/a          Medication Changes since last visit?: No    Subjective: Pt reports 9

## 2018-10-01 ENCOUNTER — OFFICE VISIT (OUTPATIENT)
Dept: PHYSICAL THERAPY | Age: 63
End: 2018-10-01
Attending: ORTHOPAEDIC SURGERY
Payer: COMMERCIAL

## 2018-10-01 DIAGNOSIS — M54.10 RADICULAR LEG PAIN: ICD-10-CM

## 2018-10-01 DIAGNOSIS — M51.36 DDD (DEGENERATIVE DISC DISEASE), LUMBAR: ICD-10-CM

## 2018-10-01 PROCEDURE — 97110 THERAPEUTIC EXERCISES: CPT

## 2018-10-03 ENCOUNTER — OFFICE VISIT (OUTPATIENT)
Dept: PHYSICAL THERAPY | Age: 63
End: 2018-10-03
Attending: ORTHOPAEDIC SURGERY
Payer: COMMERCIAL

## 2018-10-03 DIAGNOSIS — M54.10 RADICULAR LEG PAIN: ICD-10-CM

## 2018-10-03 DIAGNOSIS — M51.36 DDD (DEGENERATIVE DISC DISEASE), LUMBAR: ICD-10-CM

## 2018-10-03 PROCEDURE — 97110 THERAPEUTIC EXERCISES: CPT

## 2018-10-03 NOTE — PROGRESS NOTES
Diagnosis:  DDD (degenerative disc disease), lumbar (M51.36)  Radicular leg pain (M54.10)              Next MD visit: none scheduled  Fall Risk: standard         Precautions: n/a          Medication Changes since last visit?: No    Subjective: Pt reports 7

## 2018-10-08 ENCOUNTER — TELEPHONE (OUTPATIENT)
Dept: ORTHOPEDICS CLINIC | Facility: CLINIC | Age: 63
End: 2018-10-08

## 2018-10-08 NOTE — TELEPHONE ENCOUNTER
Pt needs note stating he is still doing PT and cannot return to work yet - asking for note to be put into [employer]

## 2018-10-09 ENCOUNTER — OFFICE VISIT (OUTPATIENT)
Dept: PHYSICAL THERAPY | Age: 63
End: 2018-10-09
Attending: ORTHOPAEDIC SURGERY
Payer: COMMERCIAL

## 2018-10-09 DIAGNOSIS — M54.10 RADICULAR LEG PAIN: ICD-10-CM

## 2018-10-09 DIAGNOSIS — M51.36 DDD (DEGENERATIVE DISC DISEASE), LUMBAR: ICD-10-CM

## 2018-10-09 PROCEDURE — 97110 THERAPEUTIC EXERCISES: CPT

## 2018-10-11 ENCOUNTER — OFFICE VISIT (OUTPATIENT)
Dept: PHYSICAL THERAPY | Age: 63
End: 2018-10-11
Attending: ORTHOPAEDIC SURGERY
Payer: COMMERCIAL

## 2018-10-11 DIAGNOSIS — M54.10 RADICULAR LEG PAIN: ICD-10-CM

## 2018-10-11 DIAGNOSIS — M51.36 DDD (DEGENERATIVE DISC DISEASE), LUMBAR: ICD-10-CM

## 2018-10-11 PROCEDURE — 97110 THERAPEUTIC EXERCISES: CPT

## 2018-10-15 ENCOUNTER — OFFICE VISIT (OUTPATIENT)
Dept: PHYSICAL THERAPY | Age: 63
End: 2018-10-15
Attending: INTERNAL MEDICINE
Payer: COMMERCIAL

## 2018-10-15 NOTE — PROGRESS NOTES
Physical Therapy Progress Note    Assessment:  Patient seen for a total of 6 PT sessions due to lumbar DDD, radicular leg pain. Patient states he is feeling a little better since start of PT, but reports continued 7-8/10 right posterior buttock pain.  Pt co

## 2018-10-17 ENCOUNTER — APPOINTMENT (OUTPATIENT)
Dept: PHYSICAL THERAPY | Age: 63
End: 2018-10-17
Attending: INTERNAL MEDICINE
Payer: COMMERCIAL

## 2018-10-19 ENCOUNTER — OFFICE VISIT (OUTPATIENT)
Dept: ORTHOPEDICS CLINIC | Facility: CLINIC | Age: 63
End: 2018-10-19

## 2018-10-19 ENCOUNTER — TELEPHONE (OUTPATIENT)
Dept: INTERNAL MEDICINE CLINIC | Facility: CLINIC | Age: 63
End: 2018-10-19

## 2018-10-19 DIAGNOSIS — M51.36 DDD (DEGENERATIVE DISC DISEASE), LUMBAR: Primary | ICD-10-CM

## 2018-10-19 DIAGNOSIS — L72.0 EPIDERMAL CYST: Primary | ICD-10-CM

## 2018-10-19 PROCEDURE — 99213 OFFICE O/P EST LOW 20 MIN: CPT | Performed by: ORTHOPAEDIC SURGERY

## 2018-10-19 PROCEDURE — 99212 OFFICE O/P EST SF 10 MIN: CPT | Performed by: ORTHOPAEDIC SURGERY

## 2018-10-19 NOTE — PROGRESS NOTES
This is a pleasant 59-year-old male with previous diagnosis of degenerative disc disease of the lumbar spine. The patient returns today for repeat evaluation. He has performed physical therapy.   However, the patient continues to have pain in the right po

## 2018-10-23 ENCOUNTER — OFFICE VISIT (OUTPATIENT)
Dept: NEUROLOGY | Facility: CLINIC | Age: 63
End: 2018-10-23

## 2018-10-23 ENCOUNTER — TELEPHONE (OUTPATIENT)
Dept: NEUROLOGY | Facility: CLINIC | Age: 63
End: 2018-10-23

## 2018-10-23 VITALS
HEART RATE: 84 BPM | SYSTOLIC BLOOD PRESSURE: 132 MMHG | RESPIRATION RATE: 16 BRPM | HEIGHT: 71 IN | DIASTOLIC BLOOD PRESSURE: 76 MMHG | BODY MASS INDEX: 25.2 KG/M2 | WEIGHT: 180 LBS

## 2018-10-23 DIAGNOSIS — Z86.79 HX OF INTRACRANIAL HEMORRHAGE: ICD-10-CM

## 2018-10-23 DIAGNOSIS — R29.898 WEAKNESS OF RIGHT FOOT: ICD-10-CM

## 2018-10-23 DIAGNOSIS — M54.41 ACUTE RIGHT-SIDED LOW BACK PAIN WITH RIGHT-SIDED SCIATICA: ICD-10-CM

## 2018-10-23 DIAGNOSIS — E11.40 TYPE 2 DIABETES MELLITUS WITH DIABETIC NEUROPATHY, WITHOUT LONG-TERM CURRENT USE OF INSULIN (HCC): ICD-10-CM

## 2018-10-23 DIAGNOSIS — M25.551 BILATERAL HIP PAIN: ICD-10-CM

## 2018-10-23 DIAGNOSIS — I10 ESSENTIAL HYPERTENSION: ICD-10-CM

## 2018-10-23 DIAGNOSIS — R20.2 INTERMITTENT TINGLING SENSATION OF RIGHT HAND AND FOOT: ICD-10-CM

## 2018-10-23 DIAGNOSIS — Z86.73 HX OF COMPLETED STROKE: ICD-10-CM

## 2018-10-23 DIAGNOSIS — M54.16 LUMBAR RADICULOPATHY: Primary | ICD-10-CM

## 2018-10-23 DIAGNOSIS — M53.3 SI (SACROILIAC) JOINT DYSFUNCTION: ICD-10-CM

## 2018-10-23 DIAGNOSIS — M25.552 BILATERAL HIP PAIN: ICD-10-CM

## 2018-10-23 DIAGNOSIS — Z98.890 HX OF CERVICAL SPINE SURGERY: ICD-10-CM

## 2018-10-23 PROCEDURE — 99244 OFF/OP CNSLTJ NEW/EST MOD 40: CPT | Performed by: PHYSICAL MEDICINE & REHABILITATION

## 2018-10-23 RX ORDER — NAPROXEN 500 MG/1
TABLET ORAL
Qty: 180 TABLET | Refills: 0 | OUTPATIENT
Start: 2018-10-23

## 2018-10-23 RX ORDER — NAPROXEN 500 MG/1
500 TABLET ORAL 2 TIMES DAILY WITH MEALS
Qty: 60 TABLET | Refills: 0 | Status: SHIPPED | OUTPATIENT
Start: 2018-10-23 | End: 2018-12-14

## 2018-10-23 NOTE — H&P
[address] [place] [address] H&P    Requesting Physician: Mike Wen MD, Dr. Shalini Bishop    Chief Complaint (Reason for Visit):  Patient presents with:  Hip Pain: Patient presents today c/o pain in right hip radiating d Diagnosis Date   • Basal cell adenoma 2014    base of nose / L medial eye area   • Intracranial hemorrhage following injury ([facility] [address] 12/4/2015    In 1979   • Other and unspecified hyperlipidemia        PAST SURGICAL HISTORY:   Past Surgical History:   Proce pain  Endo/Heme/Allergies: Negative. Psychiatric/Behavioral: Negative. All other systems reviewed and are negative. Pertinent positives and negatives noted in the HPI.         PHYSICAL EXAM:   /76 (BP Location: Right arm, Patient Position: Sit touch in all dermatomes of the lower extremities  Reflexes: 2/4 at L4 and S1  SONJA: Positive for contralateral SI joint pain when testing the left side and negative ipsilateral hip pain / tightness bilaterally  Straight leg raise: negative for radicular p arthrosis without apparent spondylolysis. Hypertrophy of posterior elements at multiple levels  DISC SPACES: Widespread disc height narrowing most pronounced at L2-3, L3-4 and L5-S1  SACROILIAC JOINTS: Unremarkable.     OTHER: Mild degenerative changes at b this medication as well as his comorbidities while taking this medication. I would like him to continue physical therapy and focus on a spine neutral program with isometric exercises.   I have given him 4 weeks off of work as I am concerned he cannot work

## 2018-10-23 NOTE — TELEPHONE ENCOUNTER
Cheo Franklin Physician   Patient would like to be contacted once forms are completed    Forms started and placed on Dr. Cameron Gamma desk for review and completion

## 2018-10-24 ENCOUNTER — MED REC SCAN ONLY (OUTPATIENT)
Dept: NEUROLOGY | Facility: CLINIC | Age: 63
End: 2018-10-24

## 2018-10-24 NOTE — TELEPHONE ENCOUNTER
Form completed and signed. Placed in fax bin. Robert Hester.  Jacqui Driver MD  Physical Medicine and Rehabilitation/Sports Medicine  [facility]

## 2018-10-26 ENCOUNTER — HOSPITAL ENCOUNTER (OUTPATIENT)
Dept: MRI IMAGING | Age: 63
Discharge: HOME | End: 2018-10-26
Attending: PHYSICAL MEDICINE & REHABILITATION
Payer: COMMERCIAL

## 2018-10-26 DIAGNOSIS — R29.898 WEAKNESS OF RIGHT FOOT: ICD-10-CM

## 2018-10-26 DIAGNOSIS — Z86.73 HX OF COMPLETED STROKE: ICD-10-CM

## 2018-10-26 DIAGNOSIS — M53.3 SI (SACROILIAC) JOINT DYSFUNCTION: ICD-10-CM

## 2018-10-26 DIAGNOSIS — R20.2 INTERMITTENT TINGLING SENSATION OF RIGHT HAND AND FOOT: ICD-10-CM

## 2018-10-26 DIAGNOSIS — M54.41 ACUTE RIGHT-SIDED LOW BACK PAIN WITH RIGHT-SIDED SCIATICA: ICD-10-CM

## 2018-10-26 DIAGNOSIS — M54.16 LUMBAR RADICULOPATHY: ICD-10-CM

## 2018-10-26 PROCEDURE — 72148 MRI LUMBAR SPINE W/O DYE: CPT | Performed by: PHYSICAL MEDICINE & REHABILITATION

## 2018-10-26 NOTE — TELEPHONE ENCOUNTER
Received in basket from [email or web address]  advising of approval for RIGHT custom AFO with posterior leaf [email or web address]. Also received in basket from [email or web address] advising of approval for physical therapy. Will call Pt. To inform. Will

## 2018-10-30 ENCOUNTER — OFFICE VISIT (OUTPATIENT)
Dept: PHYSICAL THERAPY | Age: 63
End: 2018-10-30
Attending: PHYSICAL MEDICINE & REHABILITATION
Payer: COMMERCIAL

## 2018-10-30 DIAGNOSIS — R29.898 WEAKNESS OF RIGHT FOOT: ICD-10-CM

## 2018-10-30 DIAGNOSIS — R20.2 INTERMITTENT TINGLING SENSATION OF RIGHT HAND AND FOOT: ICD-10-CM

## 2018-10-30 DIAGNOSIS — M54.41 ACUTE RIGHT-SIDED LOW BACK PAIN WITH RIGHT-SIDED SCIATICA: ICD-10-CM

## 2018-10-30 DIAGNOSIS — M53.3 SI (SACROILIAC) JOINT DYSFUNCTION: ICD-10-CM

## 2018-10-30 DIAGNOSIS — M54.16 LUMBAR RADICULOPATHY: ICD-10-CM

## 2018-10-30 DIAGNOSIS — Z86.73 HX OF COMPLETED STROKE: ICD-10-CM

## 2018-10-30 PROCEDURE — 97110 THERAPEUTIC EXERCISES: CPT | Performed by: PHYSICAL THERAPIST

## 2018-10-30 PROCEDURE — 97530 THERAPEUTIC ACTIVITIES: CPT | Performed by: PHYSICAL THERAPIST

## 2018-10-30 PROCEDURE — 97140 MANUAL THERAPY 1/> REGIONS: CPT | Performed by: PHYSICAL THERAPIST

## 2018-11-01 ENCOUNTER — APPOINTMENT (OUTPATIENT)
Dept: PHYSICAL THERAPY | Age: 63
End: 2018-11-01
Attending: PHYSICAL MEDICINE & REHABILITATION
Payer: COMMERCIAL

## 2018-11-01 ENCOUNTER — TELEPHONE (OUTPATIENT)
Dept: NEUROLOGY | Facility: CLINIC | Age: 63
End: 2018-11-01

## 2018-11-01 PROCEDURE — 97530 THERAPEUTIC ACTIVITIES: CPT

## 2018-11-01 PROCEDURE — 97110 THERAPEUTIC EXERCISES: CPT

## 2018-11-01 NOTE — TELEPHONE ENCOUNTER
Dr Naa Cruz note written 10/23/18 to be off 4 weeks follow up appointment 11/20/18. Pt called told that progress note with consents to release information was placed in front office to be picked up.  Told he will need to sign release of information so that med

## 2018-11-01 NOTE — PROGRESS NOTES
Diagnosis:  DDD (degenerative disc disease), lumbar (M51.36)  Radicular leg pain (M54.10)              Next MD visit: none scheduled  Fall Risk: standard         Precautions: n/a          Medication Changes since last visit?: No    Subjective: Pt reports 4 reassess. Plan: Continue PT per new MD order. Discussed with pt to bring in AFO next session.     Charges: Ex 2 TA 1 Total Timed Treatment: 45 min  Total Treatment Time: 45 min

## 2018-11-05 ENCOUNTER — PROCEDURE VISIT (OUTPATIENT)
Dept: NEUROLOGY | Facility: CLINIC | Age: 63
End: 2018-11-05

## 2018-11-05 DIAGNOSIS — R20.0 NUMBNESS IN FEET: ICD-10-CM

## 2018-11-05 DIAGNOSIS — E11.40 TYPE 2 DIABETES MELLITUS WITH DIABETIC NEUROPATHY, WITHOUT LONG-TERM CURRENT USE OF INSULIN (HCC): ICD-10-CM

## 2018-11-05 DIAGNOSIS — M54.16 LUMBAR RADICULOPATHY: ICD-10-CM

## 2018-11-05 DIAGNOSIS — R20.2 INTERMITTENT TINGLING SENSATION OF RIGHT HAND AND FOOT: Primary | ICD-10-CM

## 2018-11-05 PROCEDURE — 95910 NRV CNDJ TEST 7-8 STUDIES: CPT | Performed by: PHYSICAL MEDICINE & REHABILITATION

## 2018-11-05 PROCEDURE — 95886 MUSC TEST DONE W/N TEST COMP: CPT | Performed by: PHYSICAL MEDICINE & REHABILITATION

## 2018-11-05 NOTE — PROCEDURES
[address]   Nerve Conduction Study and Electromyography Procedure Note        Patient: Isidra Betancourt Hand Dominance: Right handed  Patient ID: [id number] Referring Dr: Dr. Nelly Yung  Sex: Male Test Dr: Dr. Shey Bentley R. Tibialis anterior Deep peroneal (Fibular) L4-L5 N None None None None N N N Decreased   R. Tibialis posterior Tibial L4-L5 N None None None None N N N Decreased   R.  Peroneus longus Peroneal L5-S1 N None None None None N N N Decreased   R. Gastrocnemius The decreased recruitment is caused by the patient history of stroke with RIGHT sided residual deficits although a chronic radiculopathy can not be ruled out. There is no evidence for a plexopathy, or myopathy in the tested limb(s).           Thank you

## 2018-11-06 ENCOUNTER — OFFICE VISIT (OUTPATIENT)
Dept: PHYSICAL THERAPY | Age: 63
End: 2018-11-06
Attending: PHYSICAL MEDICINE & REHABILITATION
Payer: COMMERCIAL

## 2018-11-06 ENCOUNTER — MED REC SCAN ONLY (OUTPATIENT)
Dept: NEUROLOGY | Facility: CLINIC | Age: 63
End: 2018-11-06

## 2018-11-06 DIAGNOSIS — M54.16 LUMBAR RADICULOPATHY: ICD-10-CM

## 2018-11-06 DIAGNOSIS — R29.898 WEAKNESS OF RIGHT FOOT: ICD-10-CM

## 2018-11-06 DIAGNOSIS — M53.3 SI (SACROILIAC) JOINT DYSFUNCTION: ICD-10-CM

## 2018-11-06 DIAGNOSIS — Z86.73 HX OF COMPLETED STROKE: ICD-10-CM

## 2018-11-06 DIAGNOSIS — M54.41 ACUTE RIGHT-SIDED LOW BACK PAIN WITH RIGHT-SIDED SCIATICA: ICD-10-CM

## 2018-11-06 DIAGNOSIS — R20.2 INTERMITTENT TINGLING SENSATION OF RIGHT HAND AND FOOT: ICD-10-CM

## 2018-11-06 PROCEDURE — 97530 THERAPEUTIC ACTIVITIES: CPT | Performed by: PHYSICAL THERAPIST

## 2018-11-06 PROCEDURE — 97110 THERAPEUTIC EXERCISES: CPT | Performed by: PHYSICAL THERAPIST

## 2018-11-06 NOTE — PROGRESS NOTES
Diagnosis:  DDD (degenerative disc disease), lumbar (M51.36)  Radicular leg pain (M54.10)              Next MD visit: none scheduled  Fall Risk: standard         Precautions: n/a          Medication Changes since last visit?: No    Subjective: Patient stat doing his exercises at home. Initiated therapeutic exercises to improve strength. Patient and PT goals in progress.     Goals     • Therapy Goals      Goals:    1- Pt will be I with maintenance and progression of HEP  2- Pt will report reduction in leg pain

## 2018-11-08 ENCOUNTER — APPOINTMENT (OUTPATIENT)
Dept: PHYSICAL THERAPY | Age: 63
End: 2018-11-08
Attending: PHYSICAL MEDICINE & REHABILITATION
Payer: COMMERCIAL

## 2018-11-13 ENCOUNTER — APPOINTMENT (OUTPATIENT)
Dept: PHYSICAL THERAPY | Age: 63
End: 2018-11-13
Attending: PHYSICAL MEDICINE & REHABILITATION
Payer: COMMERCIAL

## 2018-11-13 PROCEDURE — 97110 THERAPEUTIC EXERCISES: CPT

## 2018-11-15 ENCOUNTER — APPOINTMENT (OUTPATIENT)
Dept: PHYSICAL THERAPY | Age: 63
End: 2018-11-15
Attending: PHYSICAL MEDICINE & REHABILITATION
Payer: COMMERCIAL

## 2018-11-15 ENCOUNTER — TELEPHONE (OUTPATIENT)
Dept: NEUROLOGY | Facility: CLINIC | Age: 63
End: 2018-11-15

## 2018-11-15 PROCEDURE — 97110 THERAPEUTIC EXERCISES: CPT

## 2018-11-15 NOTE — TELEPHONE ENCOUNTER
Received message from [address] [place] [address] requesting approved referral for AFO.  Message forwarded to referral coordinators

## 2018-11-15 NOTE — PROGRESS NOTES
Diagnosis:  DDD (degenerative disc disease), lumbar (M51.36)  Radicular leg pain (M54.10)              Next MD visit: November 20, 2018  Fall Risk: standard         Precautions: n/a          Medication Changes since last visit?: No    Subjective: Pt report minutes x 2 reps: (decreases, B)  - HELEN 3 minutes (NE/Worse)  - PPU 1x5 (increases buttocks pain)  - PPU 2x10 (increases buttocks pain)  - seated posture correct 2x10  - prone gluteal set 2x10 5 sec holds  - prone R/L hip ext (unable)     x15min  - prone l

## 2018-11-20 ENCOUNTER — TELEPHONE (OUTPATIENT)
Dept: INTERNAL MEDICINE CLINIC | Facility: CLINIC | Age: 63
End: 2018-11-20

## 2018-11-20 ENCOUNTER — APPOINTMENT (OUTPATIENT)
Dept: PHYSICAL THERAPY | Age: 63
End: 2018-11-20
Attending: PHYSICAL MEDICINE & REHABILITATION
Payer: COMMERCIAL

## 2018-11-20 ENCOUNTER — OFFICE VISIT (OUTPATIENT)
Dept: NEUROLOGY | Facility: CLINIC | Age: 63
End: 2018-11-20

## 2018-11-20 VITALS
HEART RATE: 76 BPM | BODY MASS INDEX: 25.2 KG/M2 | WEIGHT: 180 LBS | HEIGHT: 71 IN | SYSTOLIC BLOOD PRESSURE: 130 MMHG | RESPIRATION RATE: 16 BRPM | DIASTOLIC BLOOD PRESSURE: 66 MMHG

## 2018-11-20 DIAGNOSIS — M54.41 CHRONIC RIGHT-SIDED LOW BACK PAIN WITH RIGHT-SIDED SCIATICA: ICD-10-CM

## 2018-11-20 DIAGNOSIS — Z86.73 HX OF COMPLETED STROKE: ICD-10-CM

## 2018-11-20 DIAGNOSIS — I10 ESSENTIAL HYPERTENSION: ICD-10-CM

## 2018-11-20 DIAGNOSIS — G89.29 CHRONIC RIGHT-SIDED LOW BACK PAIN WITH RIGHT-SIDED SCIATICA: ICD-10-CM

## 2018-11-20 DIAGNOSIS — E11.40 TYPE 2 DIABETES MELLITUS WITH DIABETIC NEUROPATHY, WITHOUT LONG-TERM CURRENT USE OF INSULIN (HCC): ICD-10-CM

## 2018-11-20 DIAGNOSIS — M54.16 LUMBAR RADICULOPATHY: ICD-10-CM

## 2018-11-20 DIAGNOSIS — M54.16 RIGHT LUMBAR RADICULOPATHY: Primary | ICD-10-CM

## 2018-11-20 PROCEDURE — 99214 OFFICE O/P EST MOD 30 MIN: CPT | Performed by: PHYSICAL MEDICINE & REHABILITATION

## 2018-11-20 NOTE — TELEPHONE ENCOUNTER
He does not need to stop metformin  He can stop aspirin today and restart in tomorrow after the injection.   Steroid injections procedure does not need any medication adjustments

## 2018-11-20 NOTE — TELEPHONE ENCOUNTER
Pt wife Anai Samano called in and she stated Pt is getting a got in his back bone and she wants to know if he can stop his medication Metformin 500 mg to get the shot.   Please Tamiko Ram

## 2018-11-20 NOTE — PATIENT INSTRUCTIONS
1) Continue PT and HEP  2) Obtain KAFO from [employer] (Pending authorization)  3) RIGHT L4 and L5 transforaminal epidural steroid injection  4) Please discuss with Dr. Moo Solis holding aspirin x 5 days prior to procedure and metformin 1 day prior and

## 2018-11-20 NOTE — TELEPHONE ENCOUNTER
Spouse contacted. darryl Rutledge [employer]. Explained Dr. Robert Ruiz recommendations. She verbalized understanding. She states patient hardly uses ASA. She states injection is not yet scheduled.

## 2018-11-20 NOTE — PROGRESS NOTES
[address]  Progress Note    CHIEF COMPLAINT:  Patient presents with:  Low Back Pain: Patient here for follow up- EMG 11/5/2018 MRI 10/26/2018. Patient continues with PT with fair results. . Low back radiates HISTORY:  Past Surgical History:   Procedure Laterality Date   • OTHER SURGICAL HISTORY         SOCIAL HISTORY:   Social History    Occupational History      Not on file    Tobacco Use      Smoking status: Current Some Day Smoker        Packs/day: 0.00 reviewed and are negative. Pertinent positives and negatives noted in the HPI.         PHYSICAL EXAM:   /66 (BP Location: Right arm, Patient Position: Sitting, Cuff Size: large)   Pulse 76   Resp 16   Ht 71\"   Wt 180 lb   BMI 25.10 kg/m²     Body mas for ipsilateral SI joint pain when testing the right side  Straight leg raise: negative for radicular pain symptoms. Positive for tight hamstrings on the right negative on the left  Slump test: negative for pain symptoms for radicular pain symptoms.   Was thecal sac and mild/moderate stenosis. Moderate to severe right, moderate left foraminal narrowing. L5-S1: Broad disc bulge, contacting the transiting S1 nerve roots. There is concentric epidural lipomatosis.  There is moderate to severe foraminal narrowin her some home exercises as well as physical therapy. He needs approval from his primary care physician, Dr. Adri Huerta, regarding holding his aspirin times 5 days as well as metformin 1 day before and 2 days after the procedure.   It is very difficult for him t [place]

## 2018-11-21 ENCOUNTER — TELEPHONE (OUTPATIENT)
Dept: NEUROLOGY | Facility: CLINIC | Age: 63
End: 2018-11-21

## 2018-11-21 ENCOUNTER — OFFICE VISIT (OUTPATIENT)
Dept: PHYSICAL THERAPY | Age: 63
End: 2018-11-21
Attending: PHYSICAL MEDICINE & REHABILITATION
Payer: COMMERCIAL

## 2018-11-21 PROCEDURE — 97110 THERAPEUTIC EXERCISES: CPT

## 2018-11-21 NOTE — PROGRESS NOTES
Diagnosis:  DDD (degenerative disc disease), lumbar (M51.36)  Radicular leg pain (M54.10)              Next MD visit: None scheduled  Fall Risk: standard         Precautions: n/a          Medication Changes since last visit?: No    Subjective: Pt reports h squeeze 2 x 10 5 sec holds  - supine gluteal squeezes 20x 5 sec holds  - supine R SLR 3 x 10  - bridges with ball 15x 5 sec holds  - SB bridges 1x10 5 sec holds  - PPT 2x10 5 sec holds  - supine R/L hip and knee flexion stretch 10x ea  - hooklying R/L hip progression of HEP  2- Pt will report reduction in leg pain symptoms by 50% to ease ability for pt to stand  3- Pt will be I with maintenance of proper static sitting posture   4- Pt will report abolishment of LE symptoms to be able to ambulate with improv

## 2018-11-21 NOTE — TELEPHONE ENCOUNTER
Patient's wife returned call. States patient will need progress notes from [address] [place] [address] faxed to [phone]. Notes printed and faxed.

## 2018-11-21 NOTE — TELEPHONE ENCOUNTER
Pts wife requesting letter from Dr. Wilfred Espinosa giving details as to why pt should still be on disability, please advise

## 2018-11-21 NOTE — TELEPHONE ENCOUNTER
Called patient to find out what type of note he is requesting. Patient unable to explain what exactly he needs. (Progress note vs letter crafted from Dr. Juani Harrison).      Informed patient to call his insurance company or whoever is requesting this informatio

## 2018-11-26 ENCOUNTER — APPOINTMENT (OUTPATIENT)
Dept: PHYSICAL THERAPY | Age: 63
End: 2018-11-26
Attending: INTERNAL MEDICINE
Payer: COMMERCIAL

## 2018-11-26 ENCOUNTER — TELEPHONE (OUTPATIENT)
Dept: PHYSICAL THERAPY | Age: 63
End: 2018-11-26

## 2018-11-26 ENCOUNTER — TELEPHONE (OUTPATIENT)
Dept: NEUROLOGY | Facility: CLINIC | Age: 63
End: 2018-11-26

## 2018-11-26 NOTE — TELEPHONE ENCOUNTER
Spoke with pt and informed of refill and to schedule f/u appt if condition is not improving. Pt states condition is currently stable and has no need to come in the office.

## 2018-11-26 NOTE — TELEPHONE ENCOUNTER
Patient was scheduled for right L4 and L5 TFESI under MAC on Wednesday, December 5, 2018. Medications and allergies reviewed. Patient informed to hold aspirins, nsaids, blood thinners, vitamins and fish oils 7 days prior to procedure.   Patient informed he

## 2018-11-26 NOTE — TELEPHONE ENCOUNTER
Received in basket from MARY ALICE [email or web address]  advising of approval for RIGHT L4 and L5 TFESI for one unit/DOS. Will  Inform Nursing.

## 2018-11-28 ENCOUNTER — OFFICE VISIT (OUTPATIENT)
Dept: PHYSICAL THERAPY | Age: 63
End: 2018-11-28
Attending: PHYSICAL MEDICINE & REHABILITATION
Payer: COMMERCIAL

## 2018-11-28 PROCEDURE — 97110 THERAPEUTIC EXERCISES: CPT

## 2018-11-28 NOTE — PROGRESS NOTES
Diagnosis:  DDD (degenerative disc disease), lumbar (M51.36)  Radicular leg pain (M54.10)              Next MD visit: None scheduled  Fall Risk: standard         Precautions: n/a          Medication Changes since last visit?: No    Subjective: Pt reports h L/E shuttle knee extension 4 bands 1 x 20 (setting 5)  - NuStep Level 5 (LEs only) 4 minutes - hooklying hip adduction with ball squeeze 2 x 10 5 sec holds  - supine gluteal squeezes 30x 5 sec holds  - supine R SLR 3 x 10  - bridges with ball 15x 5 sec hol posture and body mechanics - Discussion of centralization process, extension based exercises trial, & emphasis on performing HEP to d/t effectiveness x15min  - instructed on revised HEP. Handouts were created and issued to patient.   - reviewed posture and

## 2018-11-29 ENCOUNTER — APPOINTMENT (OUTPATIENT)
Dept: PHYSICAL THERAPY | Age: 63
End: 2018-11-29
Attending: PHYSICAL MEDICINE & REHABILITATION
Payer: COMMERCIAL

## 2018-11-30 ENCOUNTER — OFFICE VISIT (OUTPATIENT)
Dept: PHYSICAL THERAPY | Age: 63
End: 2018-11-30
Attending: INTERNAL MEDICINE
Payer: COMMERCIAL

## 2018-11-30 PROCEDURE — 97110 THERAPEUTIC EXERCISES: CPT

## 2018-11-30 NOTE — PROGRESS NOTES
Diagnosis:  DDD (degenerative disc disease), lumbar (M51.36)  Radicular leg pain (M54.10)              Next MD visit: None scheduled  Fall Risk: standard         Precautions: n/a          Medication Changes since last visit?: No    Subjective: Pt reports h RLE extension with weight 3# x 10 for 5 sec holds - hooklying hip adduction with ball squeeze 10 x 10 sec holds  - bridges with ball 20 x  5 sec holds   - supine R SLR 2 x 10 with 1.5# @ ankle  - supine R SLR with YTB @ ankle 2 x 10  - sidelying R clam wit extensions with 4lb wt 10 x 2  - seated R/L hip flexion with 4lb wt 10 x 2  - R calf stretch 10 x 10 sec hold  - seated R ankle AROM into dorsiflexion  - forward step up on 6 inch step 10x  - forward step up on 2nd step on stair 10x   - prone lying 3 minut

## 2018-12-03 ENCOUNTER — TELEPHONE (OUTPATIENT)
Dept: NEUROLOGY | Facility: CLINIC | Age: 63
End: 2018-12-03

## 2018-12-03 ENCOUNTER — MED REC SCAN ONLY (OUTPATIENT)
Dept: NEUROLOGY | Facility: CLINIC | Age: 63
End: 2018-12-03

## 2018-12-03 DIAGNOSIS — I10 ESSENTIAL HYPERTENSION: ICD-10-CM

## 2018-12-03 DIAGNOSIS — E11.40 TYPE 2 DIABETES MELLITUS WITH DIABETIC NEUROPATHY, WITHOUT LONG-TERM CURRENT USE OF INSULIN (HCC): ICD-10-CM

## 2018-12-03 DIAGNOSIS — E78.2 MIXED HYPERLIPIDEMIA: Primary | ICD-10-CM

## 2018-12-03 NOTE — TELEPHONE ENCOUNTER
FMLA forms have been completed and signed by Dr. Radha Washington. Copy has been sent to scanning. Pt made aware forms have been completed per his request - he or his wife, Abebe Lynn, will pick them up tomorrow, 12/4/18.     Placed at  in brown folder for

## 2018-12-03 NOTE — TELEPHONE ENCOUNTER
Pt was called told that AFO right foot was approved by [facility] ref [id number] with correct codes. He should not have problem. Pt was asked to callback regarding this issue.

## 2018-12-04 ENCOUNTER — TELEPHONE (OUTPATIENT)
Dept: NEUROLOGY | Facility: CLINIC | Age: 63
End: 2018-12-04

## 2018-12-04 NOTE — TELEPHONE ENCOUNTER
Manish Pierre from [employer] wanting to speak with someone regarding codes entered on referral and needing the referral to be authorized with updated procedure codes, please advise

## 2018-12-04 NOTE — TELEPHONE ENCOUNTER
[facility] fax left on Dr. Díaz Needs desk for review.      See phone encounter 12/03/18 at 2:59 pm.

## 2018-12-04 NOTE — TELEPHONE ENCOUNTER
Pt called stated he was told that after review by practitioner making brace he will need codes that are are different for this device a KAFO. Called Selina at [employer] stated that for KAFO to be approved. the following codes need to be on referral

## 2018-12-04 NOTE — TELEPHONE ENCOUNTER
Pt calling stating that he is still having issues with [address] [place] [address]. He was told by Chaim Mooney at [address] [place] [address] that the code for his brace is still incorrect.

## 2018-12-04 NOTE — TELEPHONE ENCOUNTER
Call received from Gutierrez Melendez at [facility] asking to fax LOV notes from 11/20/2018 stating that only pages 1-4 were received.      Call placed to wife who states she is the one who has faxed the LOV note and asked us to refax the information as she

## 2018-12-05 ENCOUNTER — OFFICE VISIT (OUTPATIENT)
Dept: SURGERY | Facility: CLINIC | Age: 63
End: 2018-12-05

## 2018-12-05 DIAGNOSIS — Z86.79 HX OF INTRACRANIAL HEMORRHAGE: ICD-10-CM

## 2018-12-05 DIAGNOSIS — G89.29 CHRONIC RIGHT-SIDED LOW BACK PAIN WITH RIGHT-SIDED SCIATICA: ICD-10-CM

## 2018-12-05 DIAGNOSIS — M54.41 CHRONIC RIGHT-SIDED LOW BACK PAIN WITH RIGHT-SIDED SCIATICA: ICD-10-CM

## 2018-12-05 DIAGNOSIS — M54.16 RIGHT LUMBAR RADICULOPATHY: Primary | ICD-10-CM

## 2018-12-05 PROCEDURE — 64484 NJX AA&/STRD TFRM EPI L/S EA: CPT | Performed by: PHYSICAL MEDICINE & REHABILITATION

## 2018-12-05 PROCEDURE — 64483 NJX AA&/STRD TFRM EPI L/S 1: CPT | Performed by: PHYSICAL MEDICINE & REHABILITATION

## 2018-12-05 NOTE — PROCEDURES
[facility]    2-LEVEL LUMBAR TRANSFORAMINAL   NAME:  Oly Boyle    MR #:    [id number] :  1955     PHYSICIAN:  Sandee Harmon        Operative Report    DATE OF PROCEDURE: 2018   PREOPERATIVE DIAGNOSES: 1.  RIGHT L4 lidocaine without epinephrine at each site. After this, the needles were removed. Each site was cleaned. Band-Aids were applied. The patient was transferred to the cart and into [facility].   The patient was given discharge instructions and will follow up in t

## 2018-12-05 NOTE — TELEPHONE ENCOUNTER
Medication refilled. Patient due for blood work and follow up.  Labs in [facility] [address]

## 2018-12-06 NOTE — TELEPHONE ENCOUNTER
T/t [email or web address] in [place]. Asking if we can add [facility] codes  X 1,  X 2,  X 2,  X 2,  X 1 and  X 1.to authorization/referral  # [id number],  will call Mio. Called Jarrell and T/t [address] [place] [address]  Informed of above

## 2018-12-07 ENCOUNTER — TELEPHONE (OUTPATIENT)
Dept: NEUROLOGY | Facility: CLINIC | Age: 63
End: 2018-12-07

## 2018-12-07 NOTE — TELEPHONE ENCOUNTER
Spoke with pt lower back pain is better after injection on 12-5-18 rated pain 6/10 today, denies any other symptom. Takes Aspirin 81mg for pain. F/u appt was schedule 12-14-18.

## 2018-12-07 NOTE — TELEPHONE ENCOUNTER
----- Message from Estella Richard MD sent at 12/6/2018  6:55 AM CST -----  Please call the patient tomorrow (12/7/18) to follow up and see how he is doing post-injection. Also please schedule him for a 2 week follow-up (around 12/19/18). Thanks    Jacquie Gee.

## 2018-12-07 NOTE — TELEPHONE ENCOUNTER
Linwood Clark @ [facility] wishing to speak with Janette Innocent in regards to iCD-10 codes and referral.

## 2018-12-14 ENCOUNTER — OFFICE VISIT (OUTPATIENT)
Dept: NEUROLOGY | Facility: CLINIC | Age: 63
End: 2018-12-14

## 2018-12-14 VITALS
BODY MASS INDEX: 25.2 KG/M2 | WEIGHT: 180 LBS | HEART RATE: 88 BPM | SYSTOLIC BLOOD PRESSURE: 108 MMHG | RESPIRATION RATE: 16 BRPM | HEIGHT: 71 IN | DIASTOLIC BLOOD PRESSURE: 50 MMHG

## 2018-12-14 DIAGNOSIS — M54.41 CHRONIC RIGHT-SIDED LOW BACK PAIN WITH RIGHT-SIDED SCIATICA: ICD-10-CM

## 2018-12-14 DIAGNOSIS — M54.16 RIGHT LUMBAR RADICULOPATHY: ICD-10-CM

## 2018-12-14 DIAGNOSIS — G89.29 CHRONIC RIGHT-SIDED LOW BACK PAIN WITH RIGHT-SIDED SCIATICA: ICD-10-CM

## 2018-12-14 DIAGNOSIS — M54.16 LUMBAR RADICULOPATHY: Primary | ICD-10-CM

## 2018-12-14 DIAGNOSIS — Z86.73 HX OF COMPLETED STROKE: ICD-10-CM

## 2018-12-14 PROCEDURE — 99213 OFFICE O/P EST LOW 20 MIN: CPT | Performed by: PHYSICAL MEDICINE & REHABILITATION

## 2018-12-14 NOTE — PATIENT INSTRUCTIONS
1) Get KAFO for the RIGHT lower extremity  2) Continue with home exercise program  3) Follow up with me in 4 weeks. If no improvement with brace and injection, then we can repeat injection.

## 2018-12-14 NOTE — PROGRESS NOTES
[address]  Progress Note    CHIEF COMPLAINT:  Patient presents with:  Hip Pain: C/O right hip radiating to toes w/ numbness/tingling/stiffness. Aggravated with walking. TESIs relief 10%.        History of P reviewed. No pertinent family history.     CURRENT MEDICATIONS:     Current Outpatient Medications:  METFORMIN  MG Oral Tab TAKE 1 TABLET BY MOUTH TWICE DAILY WITH MEAL Disp: 180 tablet Rfl: 0   TRIAMCINOLONE ACETONIDE 0.1 % External Cream APPLY EXTE no erythema, swelling, or obvious deformity. Their iliac crest and shoulder heights are symmetrical.   ROM:  Lumbar Flexion: FAROM Painless   Lumbar Extension: FAROM Gives the patient pain in the right low back, right buttock and right posterior leg(s). narrowing. L3-L4: Mild broad-based bulge with mild stenosis. Moderate right, mild left foraminal narrowing. L4-L5: Broad disc bulge with slight effacement of the ventral thecal sac and mild/moderate stenosis.  Moderate to severe right, moderate left fora right-sided low back pain with right-sided sciatica    Markel White MD  Physical Medicine and Rehabilitation/Sports Medicine  [facility]

## 2018-12-18 ENCOUNTER — OFFICE VISIT (OUTPATIENT)
Dept: INTERNAL MEDICINE CLINIC | Facility: CLINIC | Age: 63
End: 2018-12-18

## 2018-12-18 ENCOUNTER — TELEPHONE (OUTPATIENT)
Dept: INTERNAL MEDICINE CLINIC | Facility: CLINIC | Age: 63
End: 2018-12-18

## 2018-12-18 ENCOUNTER — TELEPHONE (OUTPATIENT)
Dept: NEUROLOGY | Facility: CLINIC | Age: 63
End: 2018-12-18

## 2018-12-18 VITALS
WEIGHT: 183 LBS | BODY MASS INDEX: 25.62 KG/M2 | TEMPERATURE: 99 F | DIASTOLIC BLOOD PRESSURE: 79 MMHG | SYSTOLIC BLOOD PRESSURE: 129 MMHG | HEIGHT: 71 IN | HEART RATE: 89 BPM

## 2018-12-18 DIAGNOSIS — J43.8 OTHER EMPHYSEMA (HCC): ICD-10-CM

## 2018-12-18 DIAGNOSIS — G89.29 CHRONIC LEFT SHOULDER PAIN: Primary | ICD-10-CM

## 2018-12-18 DIAGNOSIS — E78.2 MIXED HYPERLIPIDEMIA: Primary | ICD-10-CM

## 2018-12-18 DIAGNOSIS — M25.512 CHRONIC LEFT SHOULDER PAIN: Primary | ICD-10-CM

## 2018-12-18 DIAGNOSIS — Z87.891 FORMER SMOKER: ICD-10-CM

## 2018-12-18 PROCEDURE — 99212 OFFICE O/P EST SF 10 MIN: CPT | Performed by: INTERNAL MEDICINE

## 2018-12-18 PROCEDURE — 99213 OFFICE O/P EST LOW 20 MIN: CPT | Performed by: INTERNAL MEDICINE

## 2018-12-18 NOTE — PROGRESS NOTES
Janett Jeff is a 61year old male. Patient presents with:  Shoulder Pain: left shoulder   Shortness Of Breath: patient states he stopped smoking and wants lungs checked      HPI:     HPI  Patient is here for above.   Patient quit smoking completely • Intracranial hemorrhage following injury ([facility] [address] 12/4/2015    In 1979   • Other and unspecified hyperlipidemia       Past Surgical History:   Procedure Laterality Date   • Other surgical history        Social History:  Social History    Tobacco Use of the left shoulder. Negative crossarm test and seatbelt test.  Negative  drop arm test.   Neurological: He is alert and oriented to person, place, and time. He has normal reflexes. Skin: Skin is warm and dry.          ASSESSMENT AND PLAN:       Diagnos

## 2018-12-18 NOTE — TELEPHONE ENCOUNTER
Received in basket from MARY ALICE [email or web address]  advising of approval for physical therapy. Will call Pt. To inform. L/m advising Pt. 16 PT sessions were approved. Can proceed with scheduling appts.

## 2019-01-08 ENCOUNTER — HOSPITAL ENCOUNTER (OUTPATIENT)
Dept: GENERAL RADIOLOGY | Age: 64
Discharge: HOME | End: 2019-01-08
Attending: INTERNAL MEDICINE
Payer: COMMERCIAL

## 2019-01-08 ENCOUNTER — LAB ENCOUNTER (OUTPATIENT)
Dept: LAB | Age: 64
End: 2019-01-08
Attending: INTERNAL MEDICINE
Payer: COMMERCIAL

## 2019-01-08 DIAGNOSIS — I10 ESSENTIAL HYPERTENSION: ICD-10-CM

## 2019-01-08 DIAGNOSIS — E11.40 TYPE 2 DIABETES MELLITUS WITH DIABETIC NEUROPATHY, WITHOUT LONG-TERM CURRENT USE OF INSULIN (HCC): ICD-10-CM

## 2019-01-08 DIAGNOSIS — E78.2 MIXED HYPERLIPIDEMIA: ICD-10-CM

## 2019-01-08 DIAGNOSIS — Z87.891 FORMER SMOKER: ICD-10-CM

## 2019-01-08 DIAGNOSIS — E11.9 TYPE 2 DIABETES MELLITUS WITHOUT COMPLICATION, WITHOUT LONG-TERM CURRENT USE OF INSULIN (HCC): ICD-10-CM

## 2019-01-08 DIAGNOSIS — J43.8 OTHER EMPHYSEMA (HCC): ICD-10-CM

## 2019-01-08 LAB
ALT SERPL-CCNC: 44 U/L (ref 17–63)
ANION GAP SERPL CALC-SCNC: 10 MMOL/L (ref 0–18)
AST SERPL-CCNC: 33 U/L (ref 15–41)
BASOPHILS # BLD: 0.1 K/UL (ref 0–0.2)
BASOPHILS NFR BLD: 1 %
BUN SERPL-MCNC: 12 MG/DL (ref 8–20)
BUN/CREAT SERPL: 13.3 (ref 10–20)
CALCIUM SERPL-MCNC: 9.7 MG/DL (ref 8.5–10.5)
CHLORIDE SERPL-SCNC: 93 MMOL/L (ref 95–110)
CHOLEST SERPL-MCNC: 93 MG/DL (ref 110–200)
CO2 SERPL-SCNC: 24 MMOL/L (ref 22–32)
CREAT SERPL-MCNC: 0.9 MG/DL (ref 0.5–1.5)
CREAT UR-MCNC: 171.4 MG/DL
EOSINOPHIL # BLD: 0.6 K/UL (ref 0–0.7)
EOSINOPHIL NFR BLD: 6 %
ERYTHROCYTE [DISTWIDTH] IN BLOOD BY AUTOMATED COUNT: 13.2 % (ref 11–15)
EST. AVERAGE GLUCOSE BLD GHB EST-MCNC: 171 MG/DL (ref 68–126)
GLUCOSE SERPL-MCNC: 135 MG/DL (ref 70–99)
HBA1C MFR BLD HPLC: 7.6 % (ref ?–5.7)
HCT VFR BLD AUTO: 36.3 % (ref 41–52)
HDLC SERPL-MCNC: 34 MG/DL
HGB BLD-MCNC: 12.9 G/DL (ref 13.5–17.5)
LDLC SERPL CALC-MCNC: 50 MG/DL (ref 0–99)
LYMPHOCYTES # BLD: 2.4 K/UL (ref 1–4)
LYMPHOCYTES NFR BLD: 22 %
MCH RBC QN AUTO: 29.7 PG (ref 27–32)
MCHC RBC AUTO-ENTMCNC: 35.6 G/DL (ref 32–37)
MCV RBC AUTO: 83.5 FL (ref 80–100)
MICROALBUMIN UR-MCNC: 0.7 MG/DL (ref 0–1.8)
MICROALBUMIN/CREAT UR: 4.1 MG/G{CREAT} (ref 0–20)
MONOCYTES # BLD: 0.8 K/UL (ref 0–1)
MONOCYTES NFR BLD: 7 %
NEUTROPHILS # BLD AUTO: 6.8 K/UL (ref 1.8–7.7)
NEUTROPHILS NFR BLD: 64 %
NONHDLC SERPL-MCNC: 59 MG/DL
OSMOLALITY UR CALC.SUM OF ELEC: 266 MOSM/KG (ref 275–295)
PLATELET # BLD AUTO: 336 K/UL (ref 140–400)
PMV BLD AUTO: 7.4 FL (ref 7.4–10.3)
POTASSIUM SERPL-SCNC: 4.3 MMOL/L (ref 3.3–5.1)
RBC # BLD AUTO: 4.34 M/UL (ref 4.5–5.9)
SODIUM SERPL-SCNC: 127 MMOL/L (ref 136–144)
TRIGL SERPL-MCNC: 45 MG/DL (ref 1–149)
WBC # BLD AUTO: 10.6 K/UL (ref 4–11)

## 2019-01-08 PROCEDURE — 83036 HEMOGLOBIN GLYCOSYLATED A1C: CPT

## 2019-01-08 PROCEDURE — 80061 LIPID PANEL: CPT

## 2019-01-08 PROCEDURE — 36415 COLL VENOUS BLD VENIPUNCTURE: CPT

## 2019-01-08 PROCEDURE — 82043 UR ALBUMIN QUANTITATIVE: CPT

## 2019-01-08 PROCEDURE — 84460 ALANINE AMINO (ALT) (SGPT): CPT

## 2019-01-08 PROCEDURE — 82570 ASSAY OF URINE CREATININE: CPT

## 2019-01-08 PROCEDURE — 80048 BASIC METABOLIC PNL TOTAL CA: CPT

## 2019-01-08 PROCEDURE — 71046 X-RAY EXAM CHEST 2 VIEWS: CPT | Performed by: INTERNAL MEDICINE

## 2019-01-08 PROCEDURE — 85025 COMPLETE CBC W/AUTO DIFF WBC: CPT

## 2019-01-08 PROCEDURE — 84450 TRANSFERASE (AST) (SGOT): CPT

## 2019-01-14 ENCOUNTER — OFFICE VISIT (OUTPATIENT)
Dept: INTERNAL MEDICINE CLINIC | Facility: CLINIC | Age: 64
End: 2019-01-14

## 2019-01-14 VITALS
HEIGHT: 71 IN | TEMPERATURE: 98 F | BODY MASS INDEX: 25.34 KG/M2 | SYSTOLIC BLOOD PRESSURE: 149 MMHG | DIASTOLIC BLOOD PRESSURE: 78 MMHG | HEART RATE: 89 BPM | WEIGHT: 181 LBS

## 2019-01-14 DIAGNOSIS — Z12.5 SCREENING FOR PROSTATE CANCER: ICD-10-CM

## 2019-01-14 DIAGNOSIS — I10 ESSENTIAL HYPERTENSION: ICD-10-CM

## 2019-01-14 DIAGNOSIS — M54.41 CHRONIC RIGHT-SIDED LOW BACK PAIN WITH RIGHT-SIDED SCIATICA: ICD-10-CM

## 2019-01-14 DIAGNOSIS — G89.29 CHRONIC RIGHT-SIDED LOW BACK PAIN WITH RIGHT-SIDED SCIATICA: ICD-10-CM

## 2019-01-14 DIAGNOSIS — E11.40 TYPE 2 DIABETES MELLITUS WITH DIABETIC NEUROPATHY, WITHOUT LONG-TERM CURRENT USE OF INSULIN (HCC): ICD-10-CM

## 2019-01-14 DIAGNOSIS — D64.9 ANEMIA, UNSPECIFIED TYPE: Primary | ICD-10-CM

## 2019-01-14 DIAGNOSIS — E78.2 MIXED HYPERLIPIDEMIA: ICD-10-CM

## 2019-01-14 DIAGNOSIS — E87.1 HYPONATREMIA: ICD-10-CM

## 2019-01-14 PROCEDURE — 99214 OFFICE O/P EST MOD 30 MIN: CPT | Performed by: INTERNAL MEDICINE

## 2019-01-14 PROCEDURE — 99212 OFFICE O/P EST SF 10 MIN: CPT | Performed by: INTERNAL MEDICINE

## 2019-01-14 NOTE — PROGRESS NOTES
Marly Meza is a 61year old male. Patient presents with:  Abnormal Labs  Hip Pain: right hip      HPI:     HPI  Patient here for follow-up of recent blood test and for chronic conditions. Hemoglobin A1c went up from 7.3-7.6.   It was 7.3 in March Diagnosis Date   • Basal cell adenoma 2014    base of nose / L medial eye area   • Intracranial hemorrhage following injury ([facility] [address] 12/4/2015    In 1979   • Other and unspecified hyperlipidemia       Past Surgical History:   Procedure Laterality Date   • O exhibits no edema. Left shoulder: He exhibits normal range of motion, no tenderness, no bony tenderness, no swelling, no effusion and no crepitus. Neurological: He is alert and oriented to person, place, and time. He has normal reflexes.    Skin: S months, if not better consider adding glimepiride 2 mg. Up-to-date with eye exam.  Recommended daily foot exam.  Continue atorvastatin 40 mg, lisinopril 20 and aspirin 81. Foot exam done on April 4, 2018.   Counseled on diet and exercise  Avoid concentrat in 2015.   Repeat due in 2020           Relevant Orders    FERRITIN (Completed)    IRON AND TIBC (Completed)    FOLIC ACID SERUM(FOLATE) (Completed)    VITAMIN B12 (Completed)      Other Visit Diagnoses     Screening for prostate cancer        Relevant Orde

## 2019-01-14 NOTE — ASSESSMENT & PLAN NOTE
Hemoglobin A1c went up. HGBA1C:    Lab Results   Component Value Date    A1C 7.6 (H) 01/08/2019    A1C 7.2 (H) 03/27/2018    A1C 7.1 (H) 11/27/2017     (H) 01/08/2019     He is on metformin 500 mg twice a day.   Increasing metformin to 1 g twice thang

## 2019-01-14 NOTE — ASSESSMENT & PLAN NOTE
Follows up with Dr. Zeinab Maki. Had TF LESI on 12/5/2018. No significant improvement. Planning to get knee brace and starting physical therapy by end of this month.   CPM

## 2019-01-14 NOTE — ASSESSMENT & PLAN NOTE
On atorvastatin 40 mg. Lipid panel controled. Continue present management. Recommended regular exercise, weight loss and low-fat diet. LFTs looks good.

## 2019-01-14 NOTE — ASSESSMENT & PLAN NOTE
Bp elevated today. Reports home blood pressure readings are usually less than 130/80. Advised to cut down salt, exercise regularly and keep a log of blood pressure for next visit. Continue lisinopril 20 mg.   He has grade 2 KRITSEN on the right second interc

## 2019-01-14 NOTE — ASSESSMENT & PLAN NOTE
Chronic hyponatremia. Sodium of 127 osmolality of 266. Patient with history of brain surgery 1970s. Possible etiology given euvolemic hypo-osmolar hyponatremia includes SIADH or polydipsia.   Checking urine Na, osmolarity  Treatment in either case is flu

## 2019-01-14 NOTE — ASSESSMENT & PLAN NOTE
Mild drop in hemoglobin was 12.9. Monitor. Vitamin B12, folate and iron profile to be done in 3 months  Advised taking over-the-counter multivitamin with iron. No melena or blood in the stools. Last colonoscopy in 2015.   Repeat due in 2020

## 2019-01-18 ENCOUNTER — HOSPITAL ENCOUNTER (OUTPATIENT)
Dept: CARDIOLOGY CLINIC | Age: 64
Discharge: HOME | End: 2019-01-18
Attending: INTERNAL MEDICINE
Payer: COMMERCIAL

## 2019-01-18 DIAGNOSIS — R06.00 EXERTIONAL DYSPNEA: ICD-10-CM

## 2019-01-18 PROCEDURE — 93306 TTE W/DOPPLER COMPLETE: CPT | Performed by: INTERNAL MEDICINE

## 2019-02-15 ENCOUNTER — TELEPHONE (OUTPATIENT)
Dept: NEUROLOGY | Facility: CLINIC | Age: 64
End: 2019-02-15

## 2019-02-15 DIAGNOSIS — M54.16 LUMBAR RADICULOPATHY: Primary | ICD-10-CM

## 2019-02-15 NOTE — TELEPHONE ENCOUNTER
Spoke with patient, states he recently got his brace and did not do PT in December, wanted to wait for his brace first. Was told by PT his order needed to be current, please see pended order, patient will be doing PT at the [address] [place] [address], [address] location.

## 2019-02-15 NOTE — TELEPHONE ENCOUNTER
PT prescription signed    Thanks    Gaurav Gamble.  Wilfred Espinosa MD, [facility]  Physical Medicine and Rehabilitation/Sports Medicine  MEDICAL CENTER [facility]

## 2019-02-19 ENCOUNTER — TELEPHONE (OUTPATIENT)
Dept: NEUROLOGY | Facility: CLINIC | Age: 64
End: 2019-02-19

## 2019-02-19 NOTE — TELEPHONE ENCOUNTER
Received in basket from MARY ALICE [email or web address]  advising of approval for physical therapy. Will call Pt. To inform. Pt. Informed 8 PT sessions were approved. He will call to schedule appts.

## 2019-03-05 ENCOUNTER — OFFICE VISIT (OUTPATIENT)
Dept: PHYSICAL THERAPY | Age: 64
End: 2019-03-05
Attending: PHYSICAL MEDICINE & REHABILITATION
Payer: COMMERCIAL

## 2019-03-05 DIAGNOSIS — M54.16 LUMBAR RADICULOPATHY: ICD-10-CM

## 2019-03-05 PROCEDURE — 97162 PT EVAL MOD COMPLEX 30 MIN: CPT

## 2019-03-05 PROCEDURE — 97110 THERAPEUTIC EXERCISES: CPT

## 2019-03-05 NOTE — PROGRESS NOTES
P.T. EVALUATION:   Referring Physician: Dr. Shamar Mg  Diagnosis: Lumbar radiculopathy (M54.16)     Date of Onset: September 2018 Date of Service: 3/5/2019     PATIENT SUMMARY   Vidya Correa is a 61year old y/o male who presents to therapy today with clinic independently with use of JEANETTE gamino RLE    Sensation: no c/o altered LE sensation     AROM:   Lumbar ROM:  Flx: Min loss/WNL  Ext: Min loss  Rot: B min loss  Lat flx: R WNL, L min loss/pain R     Accessory motion: not tested     Flexibility: [phone]    Sincerely,  Electronically signed by therapist: Vanderbilt Fabry PT, DPT    [de-identified] certification required: Yes  I certify the need for these services furnished under this plan of treatment and while under my care.     X____________________

## 2019-03-08 ENCOUNTER — OFFICE VISIT (OUTPATIENT)
Dept: PHYSICAL THERAPY | Age: 64
End: 2019-03-08
Attending: PHYSICAL MEDICINE & REHABILITATION
Payer: COMMERCIAL

## 2019-03-08 DIAGNOSIS — M54.16 LUMBAR RADICULOPATHY: ICD-10-CM

## 2019-03-08 PROCEDURE — 97110 THERAPEUTIC EXERCISES: CPT

## 2019-03-12 ENCOUNTER — OFFICE VISIT (OUTPATIENT)
Dept: PHYSICAL THERAPY | Age: 64
End: 2019-03-12
Attending: PHYSICAL MEDICINE & REHABILITATION
Payer: COMMERCIAL

## 2019-03-12 ENCOUNTER — TELEPHONE (OUTPATIENT)
Dept: INTERNAL MEDICINE CLINIC | Facility: CLINIC | Age: 64
End: 2019-03-12

## 2019-03-12 DIAGNOSIS — M25.559 ARTHRALGIA OF HIP, UNSPECIFIED LATERALITY: Primary | ICD-10-CM

## 2019-03-12 DIAGNOSIS — M54.16 LUMBAR RADICULOPATHY: ICD-10-CM

## 2019-03-12 PROCEDURE — 97110 THERAPEUTIC EXERCISES: CPT

## 2019-03-12 NOTE — PROGRESS NOTES
Diagnosis: Lumbar radiculopathy (M54.16)         Next MD visit: 3/13/19  Fall Risk: standard         Precautions: n/a          Medication Changes since last visit?: No    Subjective: Pt reports 7/10 pain in his right low back/buttock region today.  He repor

## 2019-03-13 ENCOUNTER — OFFICE VISIT (OUTPATIENT)
Dept: NEUROLOGY | Facility: CLINIC | Age: 64
End: 2019-03-13

## 2019-03-13 ENCOUNTER — TELEPHONE (OUTPATIENT)
Dept: NEUROLOGY | Facility: CLINIC | Age: 64
End: 2019-03-13

## 2019-03-13 VITALS — SYSTOLIC BLOOD PRESSURE: 132 MMHG | DIASTOLIC BLOOD PRESSURE: 64 MMHG | RESPIRATION RATE: 17 BRPM | HEART RATE: 84 BPM

## 2019-03-13 DIAGNOSIS — M53.3 SACROILIAC JOINT DYSFUNCTION OF RIGHT SIDE: Primary | ICD-10-CM

## 2019-03-13 DIAGNOSIS — Z86.73 HX OF COMPLETED STROKE: ICD-10-CM

## 2019-03-13 DIAGNOSIS — M54.16 RIGHT LUMBAR RADICULOPATHY: ICD-10-CM

## 2019-03-13 DIAGNOSIS — M53.3 SI (SACROILIAC) JOINT DYSFUNCTION: ICD-10-CM

## 2019-03-13 DIAGNOSIS — I10 ESSENTIAL HYPERTENSION: ICD-10-CM

## 2019-03-13 PROCEDURE — 99214 OFFICE O/P EST MOD 30 MIN: CPT | Performed by: PHYSICAL MEDICINE & REHABILITATION

## 2019-03-13 NOTE — PATIENT INSTRUCTIONS
1) Continue with physical therapy and home exercises  2) I (Dr. Brittany Fuentes) will call Michael Belle at [address] [place] [address] to discuss placing a hinge in the AFO  3) Follow up with me in 1-2 weeks for the RIGHT SI joint injections under ultrasound

## 2019-03-13 NOTE — TELEPHONE ENCOUNTER
RIGHT SI joint injection under ultrasound guidance cpt code . [facility] is required for in office injection.   Will inform Nursing

## 2019-03-13 NOTE — TELEPHONE ENCOUNTER
Forms have been given to Dr Leni Wood to complete his portion. Will call Pt when the forms are complete.

## 2019-03-13 NOTE — PROGRESS NOTES
[address]  Progress Note    CHIEF COMPLAINT:  Patient presents with:  Leg Pain: LOV 12/14/18 Pt has started PT with his brace and he states that the brace and the PT is helping a little but bot taking the p Drug use: No      Sexual activity: Not on file      FAMILY HISTORY:   History reviewed. No pertinent family history.     CURRENT MEDICATIONS:     Current Outpatient Medications:  MetFORMIN HCl 1000 MG Oral Tab Take 1 tablet (1,000 mg total) by mouth 2 (t intact, spontaneous speech intact      Motor:    Musculoskeletal:    LUMBAR SPINE:  Inspection: no erythema, swelling, or obvious deformity.   Their iliac crest and shoulder heights are symmetrical.   ROM:  Lumbar Flexion: FAROM Painless   Lumbar Extension: mmol/L Final   • BUN 01/08/2019 12  8 - 20 mg/dL Final   • Creatinine 01/08/2019 0.90  0.50 - 1.50 mg/dL Final   • Calcium, Total 01/08/2019 9.7  8.5 - 10.5 mg/dL Final   • BUN/CREA Ratio 01/08/2019 13.3  10.0 - 20.0 Final   • Anion Gap 01/08/2019 10  0 - Imaging:    MRI SPINE LUMBAR (CPT=72148)  Narrative: PROCEDURE:       MRI SPINE LUMBAR (CPT=72148)     COMPARISON:          [facility], [facility] 2nd Floor, XR LUMBAR SPINE AP LAT (CPT=72100), 9/11/2018, 14:48.      INDICATIONS:            Low stenosis. Dictated by (CST): Jazzy Foster MD on 10/26/2018 at 12:52       Approved by (CST): Jazzy Foster MD on 10/26/2018 at 13:01             EMG performed on 11/5/2018 demonstrates:     Conclusion:   This is an abnormal study.     There is including the history, physical exam, diagnostic imaging, and labs, and medical decision making with greater than 50% of the total visit counseling/coordinating care.      Sacroiliac joint dysfunction of right side  (primary encounter diagnosis)  RIGHT L4 a

## 2019-03-15 ENCOUNTER — OFFICE VISIT (OUTPATIENT)
Dept: PHYSICAL THERAPY | Age: 64
End: 2019-03-15
Attending: PHYSICAL MEDICINE & REHABILITATION
Payer: COMMERCIAL

## 2019-03-15 DIAGNOSIS — M54.16 LUMBAR RADICULOPATHY: ICD-10-CM

## 2019-03-15 PROCEDURE — 97110 THERAPEUTIC EXERCISES: CPT

## 2019-03-15 NOTE — PROGRESS NOTES
Diagnosis: Lumbar radiculopathy (M54.16)         Next MD visit: 3/21/19  Fall Risk: standard         Precautions: n/a          Medication Changes since last visit?: No    Subjective: Pt reports 5-6/10 pain in his right low back/buttock region today.  He rep Treatment Time: 50 min

## 2019-03-19 ENCOUNTER — OFFICE VISIT (OUTPATIENT)
Dept: PHYSICAL THERAPY | Age: 64
End: 2019-03-19
Attending: PHYSICAL MEDICINE & REHABILITATION
Payer: COMMERCIAL

## 2019-03-19 DIAGNOSIS — M54.16 LUMBAR RADICULOPATHY: ICD-10-CM

## 2019-03-19 PROCEDURE — 97110 THERAPEUTIC EXERCISES: CPT

## 2019-03-19 NOTE — PROGRESS NOTES
Diagnosis: Lumbar radiculopathy (M54.16)         Next MD visit: 3/21/19  Fall Risk: standard         Precautions: n/a          Medication Changes since last visit?: No    Subjective: Pt reports 5-6/10 pain in his right low back/buttock region today.  He rep SKTC 1 x 20 ea  - supine R/L HS stretch with towel 10x ea 5 sec holds  - supine carine curl up 2x10 ea  - NuStep L5 x5 minutes (LEs only)   Manual Therapy        Therapeutic Activity        Modalities                    Assessment: Focus of treatment trunk

## 2019-03-20 ENCOUNTER — OFFICE VISIT (OUTPATIENT)
Dept: NEUROLOGY | Facility: CLINIC | Age: 64
End: 2019-03-20

## 2019-03-20 DIAGNOSIS — M53.3 SACROILIAC JOINT DYSFUNCTION OF RIGHT SIDE: Primary | ICD-10-CM

## 2019-03-20 PROCEDURE — 20552 NJX 1/MLT TRIGGER POINT 1/2: CPT | Performed by: PHYSICAL MEDICINE & REHABILITATION

## 2019-03-20 RX ORDER — LIDOCAINE HYDROCHLORIDE 10 MG/ML
5 INJECTION, SOLUTION INFILTRATION; PERINEURAL ONCE
Status: COMPLETED | OUTPATIENT
Start: 2019-03-20 | End: 2019-03-20

## 2019-03-20 RX ORDER — TRIAMCINOLONE ACETONIDE 40 MG/ML
40 INJECTION, SUSPENSION INTRA-ARTICULAR; INTRAMUSCULAR ONCE
Status: COMPLETED | OUTPATIENT
Start: 2019-03-20 | End: 2019-03-20

## 2019-03-20 RX ORDER — LIDOCAINE HYDROCHLORIDE 10 MG/ML
4 INJECTION, SOLUTION INFILTRATION; PERINEURAL ONCE
Status: COMPLETED | OUTPATIENT
Start: 2019-03-20 | End: 2019-03-20

## 2019-03-20 RX ADMIN — LIDOCAINE HYDROCHLORIDE 4 ML: 10 INJECTION, SOLUTION INFILTRATION; PERINEURAL at 15:59:00

## 2019-03-20 RX ADMIN — LIDOCAINE HYDROCHLORIDE 5 ML: 10 INJECTION, SOLUTION INFILTRATION; PERINEURAL at 16:01:00

## 2019-03-20 RX ADMIN — TRIAMCINOLONE ACETONIDE 40 MG: 40 INJECTION, SUSPENSION INTRA-ARTICULAR; INTRAMUSCULAR at 16:02:00

## 2019-03-20 NOTE — TELEPHONE ENCOUNTER
MD Bob Delarosa, [facility]   Caller: Unspecified (1 week ago, 10:34 AM)             They are complete on my desk. Patient has appointment tomorrow. He can pick them up tomorrow or today.  If he would like to come today, maybe we can squeeze him in

## 2019-03-20 NOTE — TELEPHONE ENCOUNTER
Pt calling checking on status of paperwork. Would like a call when ready to .  States they are due 3/21/2019

## 2019-03-20 NOTE — PROCEDURES
[address]   Sacroiliac Joint Injection Procedure Note    CHIEF COMPLAINT:  Patient presents with:   Injection: RIGHT SI joint injection       PROCEDURE PERFORMED:  RIGHT Sacroiliac joint corticosteroid injec 10.6  4.0 - 11.0 K/UL Final   • RBC 01/08/2019 4.34* 4.50 - 5.90 M/UL Final   • HGB 01/08/2019 12.9* 13.5 - 17.5 g/dL Final   • HCT 01/08/2019 36.3* 41.0 - 52.0 % Final   • MCV 01/08/2019 83.5  80.0 - 100.0 fL Final   • MCH 01/08/2019 29.7  27.0 - 32.0 pg obtained. The patient tolerated the procedure well and was able to report that the pain decreased from a 6/10 preprocedure to a 3/10  postprocedure in the SI joint. Patient verbalized understanding of assessment and plan.   Patient is in agreemen

## 2019-03-22 ENCOUNTER — MED REC SCAN ONLY (OUTPATIENT)
Dept: NEUROLOGY | Facility: CLINIC | Age: 64
End: 2019-03-22

## 2019-03-22 ENCOUNTER — APPOINTMENT (OUTPATIENT)
Dept: PHYSICAL THERAPY | Age: 64
End: 2019-03-22
Attending: PHYSICAL MEDICINE & REHABILITATION
Payer: COMMERCIAL

## 2019-03-26 ENCOUNTER — OFFICE VISIT (OUTPATIENT)
Dept: PHYSICAL THERAPY | Age: 64
End: 2019-03-26
Attending: PHYSICAL MEDICINE & REHABILITATION
Payer: COMMERCIAL

## 2019-03-26 ENCOUNTER — TELEPHONE (OUTPATIENT)
Dept: NEUROLOGY | Facility: CLINIC | Age: 64
End: 2019-03-26

## 2019-03-26 DIAGNOSIS — M54.16 LUMBAR RADICULOPATHY: ICD-10-CM

## 2019-03-26 DIAGNOSIS — M54.16 RIGHT LUMBAR RADICULOPATHY: Primary | ICD-10-CM

## 2019-03-26 DIAGNOSIS — R20.2 INTERMITTENT TINGLING SENSATION OF RIGHT HAND AND FOOT: ICD-10-CM

## 2019-03-26 DIAGNOSIS — R29.898 WEAKNESS OF RIGHT FOOT: ICD-10-CM

## 2019-03-26 DIAGNOSIS — Z86.79 HX OF INTRACRANIAL HEMORRHAGE: ICD-10-CM

## 2019-03-26 DIAGNOSIS — M53.3 SACROILIAC JOINT DYSFUNCTION OF RIGHT SIDE: ICD-10-CM

## 2019-03-26 PROCEDURE — 97110 THERAPEUTIC EXERCISES: CPT

## 2019-03-26 NOTE — PROGRESS NOTES
Diagnosis: Lumbar radiculopathy (M54.16)         Next MD visit: 3/21/19  Fall Risk: standard         Precautions: n/a          Medication Changes since last visit?: No    Subjective: Pt reports 4-5/10 pain in his right low back/buttock region today.  He rep knee   - sidelying R clam 5\" holds 1 x 15 with RTB above knee  - sidelying R hip abduction 3\" holds 2 x 10 (TC's to prevent trunk rotation)  - supine R hip/knee flexion 1 x 10   - supine R SLR 5\"holds 2 x 5 reps with 1.5# @ ankle  - R/L [place] curl up 1

## 2019-03-26 NOTE — TELEPHONE ENCOUNTER
Spoke with patient, states he did his first PT appt today and the therapist is requesting a new order with 8 visits, requesting a new order to be placed.

## 2019-03-27 NOTE — TELEPHONE ENCOUNTER
Order placed. Please call the PT and let them know order has been renewed. Melissa Escalona.  Claudia Siegel MD, [facility]  Physical Medicine and Rehabilitation/Sports Medicine  MEDICAL CENTER [facility]

## 2019-03-29 ENCOUNTER — OFFICE VISIT (OUTPATIENT)
Dept: PHYSICAL THERAPY | Age: 64
End: 2019-03-29
Attending: PHYSICAL MEDICINE & REHABILITATION
Payer: COMMERCIAL

## 2019-03-29 DIAGNOSIS — M54.16 LUMBAR RADICULOPATHY: ICD-10-CM

## 2019-03-29 PROCEDURE — 97110 THERAPEUTIC EXERCISES: CPT

## 2019-03-29 NOTE — PROGRESS NOTES
Diagnosis: Lumbar radiculopathy (M54.16)         Next MD visit: none scheduled  Fall Risk: standard         Precautions: n/a          Medication Changes since last visit?: No    Subjective: Pt reports 6-7/10 pain in his right low back/buttock region today. - sidelying R clam 5\" holds 2 x 12 with RTB above knee  - sidelying R hip abduction 3\" holds 2 x 15 (TC's to prevent trunk rotation)  - supine R hip/knee flexion 2 x 10   - supine R SLR 5\"holds 1 x 10 reps with 1.5# @ ankle  - R/L [place] curl up 1 x 1 standing and walking activities IN PROGRESS    Plan:  Continue PT.     Charges: Ex 3     Total Timed Treatment: 45 min  Total Treatment Time: 45 min

## 2019-04-02 ENCOUNTER — TELEPHONE (OUTPATIENT)
Dept: NEUROLOGY | Facility: CLINIC | Age: 64
End: 2019-04-02

## 2019-04-02 ENCOUNTER — APPOINTMENT (OUTPATIENT)
Dept: PHYSICAL THERAPY | Age: 64
End: 2019-04-02
Attending: PHYSICAL MEDICINE & REHABILITATION
Payer: COMMERCIAL

## 2019-04-03 ENCOUNTER — OFFICE VISIT (OUTPATIENT)
Dept: PHYSICAL THERAPY | Age: 64
End: 2019-04-03
Attending: PHYSICAL MEDICINE & REHABILITATION
Payer: COMMERCIAL

## 2019-04-03 PROCEDURE — 97110 THERAPEUTIC EXERCISES: CPT

## 2019-04-03 NOTE — PROGRESS NOTES
Diagnosis: Lumbar radiculopathy (M54.16)         Next MD visit: none scheduled  Fall Risk: standard         Precautions: n/a          Medication Changes since last visit?: No    Subjective: Pt reports 6/10 pain/dull ache in his right low back/buttock regio knee  - sidelying R hip abduction 3\" holds 1 x 12 (TC's to prevent trunk rotation)   - supine R SLR 5\"holds 1 x 15 reps with 1.5# @ ankle  - R/L [place] curl up 1 x 20 each  - hooklying anterior/posterior pelvic tilt 1 x 20 each    - prone glute sets 10\" Therapy           Therapeutic Activity           Modalities                          Assessment: Progressed reps and resistance of exercises without pain noted only muscle per patient report. Collaborated with PT patient response to today's treatment.

## 2019-04-03 NOTE — TELEPHONE ENCOUNTER
Received in basket from MARY ALICE [email or web address]  advising of approval for physical therapy. Will call Pt. To inform. Pt. Informed 8 PT sessions were approved. He will call to schedule appts.

## 2019-04-04 ENCOUNTER — LAB ENCOUNTER (OUTPATIENT)
Dept: LAB | Age: 64
End: 2019-04-04
Attending: INTERNAL MEDICINE
Payer: COMMERCIAL

## 2019-04-04 DIAGNOSIS — E78.2 MIXED HYPERLIPIDEMIA: ICD-10-CM

## 2019-04-04 DIAGNOSIS — I10 ESSENTIAL HYPERTENSION: ICD-10-CM

## 2019-04-04 DIAGNOSIS — E87.1 HYPONATREMIA: ICD-10-CM

## 2019-04-04 DIAGNOSIS — D64.9 ANEMIA, UNSPECIFIED TYPE: ICD-10-CM

## 2019-04-04 DIAGNOSIS — E11.40 TYPE 2 DIABETES MELLITUS WITH DIABETIC NEUROPATHY, WITHOUT LONG-TERM CURRENT USE OF INSULIN (HCC): ICD-10-CM

## 2019-04-04 DIAGNOSIS — Z12.5 SCREENING FOR PROSTATE CANCER: ICD-10-CM

## 2019-04-04 PROCEDURE — 85025 COMPLETE CBC W/AUTO DIFF WBC: CPT

## 2019-04-04 PROCEDURE — 36415 COLL VENOUS BLD VENIPUNCTURE: CPT

## 2019-04-04 PROCEDURE — 84466 ASSAY OF TRANSFERRIN: CPT

## 2019-04-04 PROCEDURE — 82728 ASSAY OF FERRITIN: CPT

## 2019-04-04 PROCEDURE — 83540 ASSAY OF IRON: CPT

## 2019-04-04 PROCEDURE — 80061 LIPID PANEL: CPT

## 2019-04-04 PROCEDURE — 84460 ALANINE AMINO (ALT) (SGPT): CPT

## 2019-04-04 PROCEDURE — 83935 ASSAY OF URINE OSMOLALITY: CPT

## 2019-04-04 PROCEDURE — 83036 HEMOGLOBIN GLYCOSYLATED A1C: CPT

## 2019-04-04 PROCEDURE — 80048 BASIC METABOLIC PNL TOTAL CA: CPT

## 2019-04-04 PROCEDURE — 82043 UR ALBUMIN QUANTITATIVE: CPT

## 2019-04-04 PROCEDURE — 84300 ASSAY OF URINE SODIUM: CPT

## 2019-04-04 PROCEDURE — 82607 VITAMIN B-12: CPT

## 2019-04-04 PROCEDURE — 82746 ASSAY OF FOLIC ACID SERUM: CPT

## 2019-04-04 PROCEDURE — 84450 TRANSFERASE (AST) (SGOT): CPT

## 2019-04-04 PROCEDURE — 82570 ASSAY OF URINE CREATININE: CPT

## 2019-04-05 ENCOUNTER — OFFICE VISIT (OUTPATIENT)
Dept: PHYSICAL THERAPY | Age: 64
End: 2019-04-05
Attending: PHYSICAL MEDICINE & REHABILITATION
Payer: COMMERCIAL

## 2019-04-05 DIAGNOSIS — R20.2 INTERMITTENT TINGLING SENSATION OF RIGHT HAND AND FOOT: ICD-10-CM

## 2019-04-05 DIAGNOSIS — M53.3 SACROILIAC JOINT DYSFUNCTION OF RIGHT SIDE: ICD-10-CM

## 2019-04-05 DIAGNOSIS — R29.898 WEAKNESS OF RIGHT FOOT: ICD-10-CM

## 2019-04-05 DIAGNOSIS — M54.16 RIGHT LUMBAR RADICULOPATHY: ICD-10-CM

## 2019-04-05 DIAGNOSIS — Z86.79 HX OF INTRACRANIAL HEMORRHAGE: ICD-10-CM

## 2019-04-05 PROCEDURE — 97110 THERAPEUTIC EXERCISES: CPT

## 2019-04-05 NOTE — PROGRESS NOTES
Diagnosis: Lumbar radiculopathy (M54.16)         Next MD visit: 5/1/19  Fall Risk: standard         Precautions: n/a          Medication Changes since last visit?: No    Subjective: Pt reports 4/10 pain/dull ache in his right low back/buttock region today. 10\" holds 1 x 10 with RTB above knee  - sidelying R hip abduction 3\" holds 1 x 15 (TC's to prevent trunk rotation)   - supine R SLR 5\"holds 2 x 10 reps with 1.5# @ ankle  - R/L [place] curl up 1 x 10 each   - standing R hip extension trunk flexed 2 x 10 LAQ 5\" holds x 10   - standing R hip extension trunk flexed 1 x 10  - supine bridges with RTB above knees 2x10 5 sec holds  - supine R SLR with RTB above knees 2x10  - hooklying hip adduction with ball squeeze 20x 5 sec holds  - hooklying hip abd/ER with

## 2019-04-08 NOTE — PROGRESS NOTES
Viewed by Angela Hernandez on 4/7/2019  5:27 PM   Written by Tasha Jones MD on 4/7/2019  4:05 PM   Dear Nicky Gonzales,     Your blood and urine test shows that your sodium levels are low likely due to the history of brain surgery.  Limit the water intake

## 2019-04-09 ENCOUNTER — APPOINTMENT (OUTPATIENT)
Dept: PHYSICAL THERAPY | Age: 64
End: 2019-04-09
Attending: PHYSICAL MEDICINE & REHABILITATION
Payer: COMMERCIAL

## 2019-04-10 ENCOUNTER — OFFICE VISIT (OUTPATIENT)
Dept: PHYSICAL THERAPY | Age: 64
End: 2019-04-10
Attending: PHYSICAL MEDICINE & REHABILITATION
Payer: COMMERCIAL

## 2019-04-10 PROCEDURE — 97110 THERAPEUTIC EXERCISES: CPT

## 2019-04-10 NOTE — PROGRESS NOTES
Diagnosis: Lumbar radiculopathy (M54.16)         Next MD visit: 5/1/19  Fall Risk: standard         Precautions: n/a          Medication Changes since last visit?: No    Subjective: Pt reports 3-4/10 at worst pain/dull ache in his right low back/buttock re bridging 2 x 10  - R single leg bridge 1 x 10   - supine R SLR 3\" holds 2 x 10 with RTB just below knee   - sidelying R clam 10\" holds 1 x 12 with RTB above knee  - sidelying R hip abduction 3\" holds 2 x 10 (TC's to prevent trunk rotation)   - supine R - standing R hip extension trunk flexed 1 x 10  - bridges with RTB above knees 5\" holds 3 x 10   - supine R SLR 3\" holds x 10 with RTB above knee   - sidelying R clam 5\" holds 1 x 15 with RTB above knee  - sidelying R hip abduction 3\" holds 2 x

## 2019-04-12 ENCOUNTER — APPOINTMENT (OUTPATIENT)
Dept: PHYSICAL THERAPY | Age: 64
End: 2019-04-12
Attending: PHYSICAL MEDICINE & REHABILITATION
Payer: COMMERCIAL

## 2019-04-12 RX ORDER — ATORVASTATIN CALCIUM 40 MG/1
TABLET, FILM COATED ORAL
Qty: 90 TABLET | Refills: 1 | Status: SHIPPED | OUTPATIENT
Start: 2019-04-12 | End: 2019-09-10

## 2019-04-16 ENCOUNTER — APPOINTMENT (OUTPATIENT)
Dept: PHYSICAL THERAPY | Age: 64
End: 2019-04-16
Attending: PHYSICAL MEDICINE & REHABILITATION
Payer: COMMERCIAL

## 2019-04-17 ENCOUNTER — OFFICE VISIT (OUTPATIENT)
Dept: PHYSICAL THERAPY | Age: 64
End: 2019-04-17
Attending: PHYSICAL MEDICINE & REHABILITATION
Payer: COMMERCIAL

## 2019-04-17 PROCEDURE — 97110 THERAPEUTIC EXERCISES: CPT

## 2019-04-17 NOTE — PROGRESS NOTES
Diagnosis: Lumbar radiculopathy (M54.16)         Next MD visit: 5/1/19  Fall Risk: standard         Precautions: n/a          Medication Changes since last visit?: No    Subjective: Pt reports 6/10 at worst pain/dull ache in his right low back/buttock jimmy 12   - supine SB bridging 3 x 10  - R single leg bridge 1 x 12   - supine R SLR 3\" holds 2 x 12 with RTB just below knee   - sidelying R clam 10\" holds 1 x 12 with RTB above knee  - sidelying R hip abduction 3\" holds 2 x 12 (TC's to prevent trunk rotati bridges with RTB above knees 10\" holds x 10   - supine R SLR 3\" holds 2 x 10 with RTB above knee   - sidelying R clam 5\" holds 2 x 12 with RTB above knee  - sidelying R hip abduction 3\" holds 2 x 15 (TC's to prevent trunk rotation)  - supine R hip/knee HEP  2- Pt will demo increase in RLE strength by 2/3 MMT grade to improve his ability to negotiate stairs PROGRESSING  3- Pt will report 2/10 pain or less with standing and walking activities IN PROGRESS    Plan:  Continue PT.     Charges: Ex 3     Total Ti

## 2019-04-19 ENCOUNTER — OFFICE VISIT (OUTPATIENT)
Dept: PHYSICAL THERAPY | Age: 64
End: 2019-04-19
Attending: PHYSICAL MEDICINE & REHABILITATION
Payer: COMMERCIAL

## 2019-04-19 PROCEDURE — 97110 THERAPEUTIC EXERCISES: CPT

## 2019-04-19 NOTE — PROGRESS NOTES
Diagnosis: Lumbar radiculopathy (M54.16)         Next MD visit: 5/1/19  Fall Risk: standard         Precautions: n/a          Medication Changes since last visit?: No    Subjective: Pt reports right sided buttock/hip pain and right calf pain rated as 4/10 x 15   - supine R SLR 3\" holds 1 x 10 with RTB @ ankles  - sidelying R clam 10\" holds 1 x 12 with RTB above knee  - sidelying R hip abduction 3\" holds 3 x 10 (TC's to prevent trunk rotation)   - supine R SLR 5\"holds 2 x 12 reps with 3# @ ankle  - R/L M 10 with RTB just below knee   - sidelying R clam 10\" holds 1 x 10 with RTB above knee  - sidelying R hip abduction 3\" holds 1 x 12 (TC's to prevent trunk rotation)   - supine R SLR 5\"holds 1 x 15 reps with 1.5# @ ankle  - R/L Charlotte curl up 1 x 20 each holds  - supine carine curl up 2x10 ea  - NuStep L5 x5 minutes (LEs only)   Manual Therapy               Therapeutic Activity               Modalities                                  Assessment: Continued with quad and gluteal strengthening and added core

## 2019-04-23 ENCOUNTER — OFFICE VISIT (OUTPATIENT)
Dept: PHYSICAL THERAPY | Age: 64
End: 2019-04-23
Attending: PHYSICAL MEDICINE & REHABILITATION
Payer: COMMERCIAL

## 2019-04-23 PROCEDURE — 97110 THERAPEUTIC EXERCISES: CPT

## 2019-04-23 NOTE — PROGRESS NOTES
Diagnosis: Lumbar radiculopathy (M54.16)         Next MD visit: 5/1/19  Fall Risk: standard         Precautions: n/a          Medication Changes since last visit?: No    Subjective: Pt reports right sided buttock/hip pain and right calf pain rated as 4-5/1 curl up 1x10 ea 5 sec holds - bridges with RTB above knees 10\" holds 2 x 10   - supine SB bridging 3 x 10  - R single leg bridge 3 x 10    - supine R SLR 3\" holds 1 x 12 with RTB @ ankles  - sidelying R clam 10\" holds 1 x 12 with RTB above knee  - sidel holds 2 x 12   - supine SB bridging 1 x 15  - supine R SLR 3\" holds 1 x 15 with RTB just below knee   - sidelying R clam 10\" holds 1 x 10 with RTB above knee  - sidelying R hip abduction 3\" holds 1 x 15 (TC's to prevent trunk rotation)   - supine R SLR hip extension (unable d/t strength deficit)   - prone lumbar PA mobs grade 2 x 5 minutes   - PPU 1 x 10        - seated R LAQ 5\" holds x 10   - standing R hip extension trunk flexed 1 x 10  - supine bridges with RTB above knees 2x10 5 sec holds  - supine

## 2019-04-26 ENCOUNTER — OFFICE VISIT (OUTPATIENT)
Dept: PHYSICAL THERAPY | Age: 64
End: 2019-04-26
Attending: PHYSICAL MEDICINE & REHABILITATION
Payer: COMMERCIAL

## 2019-04-26 PROCEDURE — 97110 THERAPEUTIC EXERCISES: CPT

## 2019-04-26 NOTE — PROGRESS NOTES
Diagnosis:  DDD (degenerative disc disease), lumbar (M51.36)  Radicular leg pain (M54.10)              Next MD visit: November 20, 2018  Fall Risk: standard         Precautions: n/a          Medication Changes since last visit?: No    Subjective: Pt report performing HEP to d/t effectiveness x15min  - instructed on revised HEP. Handouts were created and issued to patient.   - reviewed posture and body mechanics  - discussed patient's question      Manual PT      x10min  - prone lumbar spine mobilization into [phone]

## 2019-04-30 ENCOUNTER — OFFICE VISIT (OUTPATIENT)
Dept: PHYSICAL THERAPY | Age: 64
End: 2019-04-30
Attending: PHYSICAL MEDICINE & REHABILITATION
Payer: COMMERCIAL

## 2019-04-30 PROCEDURE — 97110 THERAPEUTIC EXERCISES: CPT

## 2019-04-30 NOTE — PROGRESS NOTES
Physical Therapy Progress/Discharge Summary  Diagnosis: Lumbar radiculopathy (M54.16)         Next MD visit: 5/1/19  Fall Risk: standard         Precautions: n/a          Medication Changes since last visit?: No   Assessment: Patient has been seen for a to SB 20x  - supine bridges 3x10  - supine R SLR with 4# 2x10  - supine R single leg bridges 3x10  - prone gluteal set 20x 5 sec holds  - sidelying R clams with GTB 2x10   - shuttle machine B knee ext 6 bands 3x10  - shuttle machine R knee ext 4 bands 2x10 - bridging 3 x 10  - R single leg bridge 3 x 10    - supine R SLR 3\" holds 1 x 12 with RTB @ ankles  - sidelying R clam 10\" holds 1 x 12 with RTB above knee  - sidelying R hip abduction 3\" holds 3 x 10 (TC's to prevent trunk rotation)   - supine R SLR 3 x below knee   - sidelying R clam 10\" holds 1 x 10 with RTB above knee  - sidelying R hip abduction 3\" holds 1 x 15 (TC's to prevent trunk rotation)   - supine R SLR 5\"holds 2 x 10 reps with 1.5# @ ankle  - R/L Charlotte curl up 1 x 10 each   - standing R hi - PPU 1 x 10        - seated R LAQ 5\" holds x 10   - standing R hip extension trunk flexed 1 x 10  - supine bridges with RTB above knees 2x10 5 sec holds  - supine R SLR with RTB above knees 2x10  - hooklying hip adduction with ball squeeze 20x 5 sec ho

## 2019-05-01 ENCOUNTER — OFFICE VISIT (OUTPATIENT)
Dept: NEUROLOGY | Facility: CLINIC | Age: 64
End: 2019-05-01

## 2019-05-01 VITALS
HEART RATE: 86 BPM | BODY MASS INDEX: 25.05 KG/M2 | HEIGHT: 70 IN | RESPIRATION RATE: 18 BRPM | DIASTOLIC BLOOD PRESSURE: 58 MMHG | SYSTOLIC BLOOD PRESSURE: 134 MMHG | WEIGHT: 175 LBS

## 2019-05-01 DIAGNOSIS — Z86.79 HX OF INTRACRANIAL HEMORRHAGE: ICD-10-CM

## 2019-05-01 DIAGNOSIS — M17.11 PRIMARY OSTEOARTHRITIS OF RIGHT KNEE: ICD-10-CM

## 2019-05-01 DIAGNOSIS — I99.9 VASCULAR DISEASE: Primary | ICD-10-CM

## 2019-05-01 DIAGNOSIS — E11.40 TYPE 2 DIABETES MELLITUS WITH DIABETIC NEUROPATHY, WITHOUT LONG-TERM CURRENT USE OF INSULIN (HCC): ICD-10-CM

## 2019-05-01 DIAGNOSIS — M54.16 LUMBAR RADICULOPATHY: ICD-10-CM

## 2019-05-01 DIAGNOSIS — R29.898 WEAKNESS OF RIGHT FOOT: ICD-10-CM

## 2019-05-01 DIAGNOSIS — M53.3 SI (SACROILIAC) JOINT DYSFUNCTION: ICD-10-CM

## 2019-05-01 DIAGNOSIS — Z86.73 HX OF COMPLETED STROKE: ICD-10-CM

## 2019-05-01 PROCEDURE — 99214 OFFICE O/P EST MOD 30 MIN: CPT | Performed by: PHYSICAL MEDICINE & REHABILITATION

## 2019-05-01 NOTE — PATIENT INSTRUCTIONS
1) Continue with therapy to work on getting back to driving.  Strengthening foot intrinsic and ankle stabilizers  2) Make an appointment to see vascular doctors to evaluate for the redness in the RIGHT outside foot  3) Follow up with me in 2 months

## 2019-05-01 NOTE — PROGRESS NOTES
[address]  Progress Note    CHIEF COMPLAINT:  Patient presents with:  Low Back Pain: LOV 03/20/19 Pt states that its working but very slow.  Pt states there is  alot of pain standing and walking, but its get file      FAMILY HISTORY:   No family history on file.     CURRENT MEDICATIONS:     Current Outpatient Medications:  ATORVASTATIN 40 MG Oral Tab TAKE 1 TABLET BY MOUTH NIGHTLY Disp: 90 tablet Rfl: 1   folic acid 1 MG Oral Tab Take 1 tablet (1 mg total) by m right knee  Skin: No lesions noted. Mild redness noted at the right fourth and fifth toes with normal skin blanching and good capillary refill.   Cognition: alert & oriented x 3, attentive, able to follow 2 step commands, comprehention intact, spontaneous 102  >=60 Final   • AST 04/04/2019 19  15 - 37 U/L Final   • ALT 04/04/2019 32  16 - 61 U/L Final   • HgbA1C 04/04/2019 6.5* <5.7 % Final   • Estimated Average Glucose 04/04/2019 140* 68 - 126 mg/dL Final   • Cholesterol, Total 04/04/2019 101  <200 mg/dL F Final   • Neutrophil % 04/04/2019 70.3  % Final   • Lymphocyte % 04/04/2019 21.7  % Final   • Monocyte % 04/04/2019 6.0  % Final   • Eosinophil % 04/04/2019 1.4  % Final   • Basophil % 04/04/2019 0.3  % Final   • Immature Granulocyte % 04/04/2019 0.3  % Fi Final   • RBC 01/08/2019 4.34* 4.50 - 5.90 M/UL Final   • HGB 01/08/2019 12.9* 13.5 - 17.5 g/dL Final   • HCT 01/08/2019 36.3* 41.0 - 52.0 % Final   • MCV 01/08/2019 83.5  80.0 - 100.0 fL Final   • MCH 01/08/2019 29.7  27.0 - 32.0 pg Final   • MCHC 01/08/2 There is a mild broad-based bulge without significant central stenosis. Mild right foraminal narrowing. L3-L4:   Mild broad-based bulge with mild stenosis. Moderate right, mild left foraminal narrowing.    L4-L5:   Broad disc bulge with slight effacement has improved and would like clearance for his occupation  Discharge Instructions were provided as documented in AVS summary. The patient was in agreement with the assessment and plan. All questions were answered. There were no barriers to learning.     I

## 2019-05-03 ENCOUNTER — OFFICE VISIT (OUTPATIENT)
Dept: PHYSICAL THERAPY | Age: 64
End: 2019-05-03
Attending: PHYSICAL MEDICINE & REHABILITATION
Payer: COMMERCIAL

## 2019-05-03 PROCEDURE — 97110 THERAPEUTIC EXERCISES: CPT

## 2019-05-03 NOTE — PROGRESS NOTES
Diagnosis: Lumbar radiculopathy (M54.16)         Next MD visit: 5/1/19  Fall Risk: standard         Precautions: n/a          Medication Changes since last visit?: No   Subjective: Pt reports 6/10 foot pain and lower leg pain today.  He states he saw the MD holds 1 x 12   - supine R SLR 3\" holds 2 x 12 with GTB above knees   - sidelying R clam 1 x 15 with GTB above knee   - sidelying R hip abduction 10\" holds 1 x 15 (TC's to prevent trunk rotation)   - R single leg bridge 3 x 10   - supine R SLR 1 x 12 with supine R SLR 3\" holds 1 x 10 with RTB @ ankles  - sidelying R clam 10\" holds 1 x 12 with RTB above knee  - sidelying R hip abduction 3\" holds 3 x 10 (TC's to prevent trunk rotation)   - supine R SLR 5\"holds 2 x 12 reps with 3# @ ankle  - R/L Charlotte savage RTB just below knee   - sidelying R clam 10\" holds 1 x 10 with RTB above knee  - sidelying R hip abduction 3\" holds 1 x 12 (TC's to prevent trunk rotation)   - supine R SLR 5\"holds 1 x 15 reps with 1.5# @ ankle  - R/L Charlotte curl up 1 x 20 each  - hookl supine carine curl up 2x10 ea  - NuStep L5 x5 minutes (LEs only)   Manual Therapy                   Therapeutic Activity                   Modalities                                          Assessment: Initiated R ankle ROM, strengthening exercises today.

## 2019-05-07 ENCOUNTER — TELEPHONE (OUTPATIENT)
Dept: NEUROLOGY | Facility: CLINIC | Age: 64
End: 2019-05-07

## 2019-05-08 NOTE — TELEPHONE ENCOUNTER
Received in basket from MARY ALICE [email or web address]  advising of approval for physical therapy. Will call Pt. To inform. Pt. Informed 16 PT sessions were approved. He will call to schedule appts.

## 2019-05-14 ENCOUNTER — OFFICE VISIT (OUTPATIENT)
Dept: PHYSICAL THERAPY | Age: 64
End: 2019-05-14
Attending: PHYSICAL MEDICINE & REHABILITATION
Payer: COMMERCIAL

## 2019-05-14 DIAGNOSIS — Z86.73 HX OF COMPLETED STROKE: ICD-10-CM

## 2019-05-14 DIAGNOSIS — R29.898 WEAKNESS OF RIGHT FOOT: ICD-10-CM

## 2019-05-14 DIAGNOSIS — I99.9 VASCULAR DISEASE: ICD-10-CM

## 2019-05-14 DIAGNOSIS — M17.11 PRIMARY OSTEOARTHRITIS OF RIGHT KNEE: ICD-10-CM

## 2019-05-14 DIAGNOSIS — E11.40 TYPE 2 DIABETES MELLITUS WITH DIABETIC NEUROPATHY, WITHOUT LONG-TERM CURRENT USE OF INSULIN (HCC): ICD-10-CM

## 2019-05-14 DIAGNOSIS — Z86.79 HX OF INTRACRANIAL HEMORRHAGE: ICD-10-CM

## 2019-05-14 DIAGNOSIS — M53.3 SI (SACROILIAC) JOINT DYSFUNCTION: ICD-10-CM

## 2019-05-14 DIAGNOSIS — M54.16 LUMBAR RADICULOPATHY: ICD-10-CM

## 2019-05-14 PROCEDURE — 97110 THERAPEUTIC EXERCISES: CPT

## 2019-05-14 NOTE — PROGRESS NOTES
Diagnosis: Lumbar radiculopathy (M54.16)         Next MD visit: 5/1/19  Fall Risk: standard         Precautions: n/a          Medication Changes since last visit?: No   Subjective: Pt reports 4-5/10 R calf and toe pain.  He reports his hip and back are doin bands 3 x 10 (setting 5)   - B L/E shuttle knee extension 6 bands 2 x 10 (seting 5)  - standing R hip abduction/extension with YTB @ ankle 3\" holds x 10 each   - bridges with blue t-band above knees 5\" holds 1 x 12   - supine R SLR 3\" holds 2 x 12 with shuttle knee extension 3 bands 3 x 10 (setting 5)   - standing R hip extension 2 x 10  - bridges with RTB above knees 10\" holds 1 x 15   - supine SB bridging 3 x 10  - R single leg bridge 1 x 15   - supine R SLR 3\" holds 1 x 10 with RTB @ ankles  - sidel R L/E shuttle knee extension 3 bands 2 x 10 (setting 5)  - reassess x 5 minutes  - bridges with RTB above knees 5\" holds 2 x 10   - supine SB bridging 1 x 10   - supine R SLR 3\" holds 1 x 10 with RTB just below knee   - sidelying R clam 10\" holds 1 x 10 RTB 2x10 ea  - sidelying R clams with RTB above knees 2x10 5 sec holds  - sidelying R/L hip abduction 2x10 ea  - supine R/L SKTC 1 x 20 ea  - supine R/L HS stretch with towel 10x ea 5 sec holds  - supine carine curl up 2x10 ea  - NuStep L5 x5 minutes (LEs

## 2019-05-21 ENCOUNTER — TELEPHONE (OUTPATIENT)
Dept: NEUROLOGY | Facility: CLINIC | Age: 64
End: 2019-05-21

## 2019-05-21 ENCOUNTER — OFFICE VISIT (OUTPATIENT)
Dept: PHYSICAL THERAPY | Age: 64
End: 2019-05-21
Attending: PHYSICAL MEDICINE & REHABILITATION
Payer: COMMERCIAL

## 2019-05-21 DIAGNOSIS — M17.11 PRIMARY OSTEOARTHRITIS OF RIGHT KNEE: ICD-10-CM

## 2019-05-21 DIAGNOSIS — M53.3 SI (SACROILIAC) JOINT DYSFUNCTION: ICD-10-CM

## 2019-05-21 DIAGNOSIS — R29.898 WEAKNESS OF RIGHT FOOT: ICD-10-CM

## 2019-05-21 DIAGNOSIS — Z86.79 HX OF INTRACRANIAL HEMORRHAGE: ICD-10-CM

## 2019-05-21 DIAGNOSIS — Z86.73 HX OF COMPLETED STROKE: ICD-10-CM

## 2019-05-21 DIAGNOSIS — I99.9 VASCULAR DISEASE: ICD-10-CM

## 2019-05-21 DIAGNOSIS — M54.16 LUMBAR RADICULOPATHY: ICD-10-CM

## 2019-05-21 DIAGNOSIS — E11.40 TYPE 2 DIABETES MELLITUS WITH DIABETIC NEUROPATHY, WITHOUT LONG-TERM CURRENT USE OF INSULIN (HCC): ICD-10-CM

## 2019-05-21 PROCEDURE — 97110 THERAPEUTIC EXERCISES: CPT

## 2019-05-21 NOTE — PROGRESS NOTES
Diagnosis: Lumbar radiculopathy (M54.16)         Next MD visit: 5/1/19  Fall Risk: standard         Precautions: n/a          Medication Changes since last visit?: No   Subjective: Pt reports he woke up with LBP this morning.  He reports 6/10 low back pain shuttle machine R knee ext 4 bands 2x10  - supine R/L Charlotte Curl up 1x15 ea  - supine R ankle DF 2x10 ea  - supine R ankle DF with YTB 2x10 ea  - supine R ankle PF AAROM 10x     - reassessment  - seated R ankle DF 30x  - supine DKTC with SB 20x  - supine x 10 reps with 3# @ ankle  - R L/E shuttle knee extension 4-5 bands 2 x 10 (setting 5)   - supine R SAQs with 5# 2x10 5 sec holds  - supine R/L carine curl up 1x10 ea 5 sec holds - bridges with RTB above knees 10\" holds 2 x 10   - supine SB bridging 3 x 1 curl up 1 x 15 each   - R L/E shuttle knee extension 3 bands 2 x 10 (setting 5)   - standing R hip extension 1 x 10  - bridges with RTB above knees 5\" holds 2 x 12   - supine SB bridging 1 x 15  - supine R SLR 3\" holds 1 x 15 with RTB just below knee   - flexion 1 x 10   - supine R SLR 5\"holds 2 x 5 reps with 1.5# @ ankle  - R/L Charlotte curl up 1 x 10 each   - prone glute sets 5\" holds x 30  - prone R hip extension (unable d/t strength deficit)   - prone lumbar PA mobs grade 2 x 5 minutes   - PPU 1 x 10

## 2019-05-31 ENCOUNTER — OFFICE VISIT (OUTPATIENT)
Dept: PHYSICAL THERAPY | Age: 64
End: 2019-05-31
Attending: PHYSICAL MEDICINE & REHABILITATION
Payer: COMMERCIAL

## 2019-05-31 PROCEDURE — 97110 THERAPEUTIC EXERCISES: CPT

## 2019-05-31 NOTE — PROGRESS NOTES
Diagnosis: Lumbar radiculopathy (M54.16)         Next MD visit: 6/26/19  Fall Risk: standard         Precautions: n/a          Medication Changes since last visit?: No   Subjective: Patient reports 4/10 right sided low back pain today and 2/10 right foot p supine R ankle DF with YTB 3x10  - supine R SLR with 5# 2x10  - supine R SL bridge 3x10  - shuttle machine B knee ext 6 bands 3x10  - shuttle machine R knee ext 4 bands 2x10  - standing R hip abduction/hip extension 2.5# 2x10 ea  - seated R ankle DF 30x  - 3 x 10   - R L/E shuttle knee extension 4 bands 3 x 10 (setting 5)  - bridges with RTB above knees 10\" holds 2 x 10   - supine SB bridging 3 x 10  - R single leg bridge 2 x 10    - supine R SLR 3\" holds 1 x 12 with RTB @ ankles  - sidelying R clam 10\" h extension 1 x 12  - bridges with RTB above knees 10\" holds 1 x 10   - supine SB bridging 2 x 10  - R single leg bridge 1 x 10   - supine R SLR 3\" holds 2 x 10 with RTB just below knee   - sidelying R clam 10\" holds 1 x 12 with RTB above knee  - sidelyin glute sets 5\" holds x 30  - prone lumbar PA mobs grade 2 x 5 minutes   - PPU 1 x 10        - standing R hip extension trunk flexed 1 x 10  - bridges with RTB above knees 5\" holds 3 x 10   - supine R SLR 3\" holds x 10 with RTB above knee   - sidelying R

## 2019-06-04 ENCOUNTER — OFFICE VISIT (OUTPATIENT)
Dept: PHYSICAL THERAPY | Age: 64
End: 2019-06-04
Attending: PHYSICAL MEDICINE & REHABILITATION
Payer: COMMERCIAL

## 2019-06-04 DIAGNOSIS — M54.16 LUMBAR RADICULOPATHY: ICD-10-CM

## 2019-06-04 DIAGNOSIS — R29.898 WEAKNESS OF RIGHT FOOT: ICD-10-CM

## 2019-06-04 DIAGNOSIS — M17.11 PRIMARY OSTEOARTHRITIS OF RIGHT KNEE: ICD-10-CM

## 2019-06-04 DIAGNOSIS — I99.9 VASCULAR DISEASE: ICD-10-CM

## 2019-06-04 DIAGNOSIS — Z86.79 HX OF INTRACRANIAL HEMORRHAGE: ICD-10-CM

## 2019-06-04 DIAGNOSIS — E11.40 TYPE 2 DIABETES MELLITUS WITH DIABETIC NEUROPATHY, WITHOUT LONG-TERM CURRENT USE OF INSULIN (HCC): ICD-10-CM

## 2019-06-04 DIAGNOSIS — Z86.73 HX OF COMPLETED STROKE: ICD-10-CM

## 2019-06-04 DIAGNOSIS — M53.3 SI (SACROILIAC) JOINT DYSFUNCTION: ICD-10-CM

## 2019-06-04 PROCEDURE — 97110 THERAPEUTIC EXERCISES: CPT

## 2019-06-04 NOTE — PROGRESS NOTES
Diagnosis: Lumbar radiculopathy (M54.16)         Next MD visit: 7/12/19  Fall Risk: standard         Precautions: n/a          Medication Changes since last visit?: No   Subjective: Patient reports 4/10 right hip pain today and 5/10 right foot pain.  [place] 3x10  - supine R SLR with 2.5# 3x10  - supine R SAQs with 5# 2x10 5 sec holds  - seated R/L STKC 10x ea  - supine R/L Charlotte 1x15 ea  - supine SB bridges 3x10  - sidelying R hip abduction 2x10  - shuttle machine B knee extension 6 bands 3x10  - shuttle mac GTB above knees 5\" holds 2 x 10   - supine R SLR 3\" holds 2 x 10 with GTB above knees   - sidelying R clam 1 x 10 with GTB above knee   - sidelying R hip abduction 10\" holds 1 x 12 (TC's to prevent trunk rotation)   - R single leg bridge 3 x 10   - supi 1 x 12   - supine SB bridging 3 x 10  - R single leg bridge 1 x 12   - supine R SLR 3\" holds 2 x 12 with RTB just below knee   - sidelying R clam 10\" holds 1 x 12 with RTB above knee  - sidelying R hip abduction 3\" holds 2 x 12 (TC's to prevent trunk ro bridges with RTB above knees 10\" holds x 10   - supine R SLR 3\" holds 2 x 10 with RTB above knee   - sidelying R clam 5\" holds 2 x 12 with RTB above knee  - sidelying R hip abduction 3\" holds 2 x 15 (TC's to prevent trunk rotation)  - supine R hip/knee maintenance and progression of HEP - IN PROGRESS  2- Pt will demo increase in RLE strength by 2/3 MMT grade to improve his ability to negotiate stairs NEARLY MET  3- Pt will report 2/10 pain or less with standing and walking activities IN PROGRESS    Plan:

## 2019-06-07 ENCOUNTER — OFFICE VISIT (OUTPATIENT)
Dept: PHYSICAL THERAPY | Age: 64
End: 2019-06-07
Attending: PHYSICAL MEDICINE & REHABILITATION
Payer: COMMERCIAL

## 2019-06-07 DIAGNOSIS — M54.16 LUMBAR RADICULOPATHY: ICD-10-CM

## 2019-06-07 DIAGNOSIS — M53.3 SI (SACROILIAC) JOINT DYSFUNCTION: ICD-10-CM

## 2019-06-07 DIAGNOSIS — E11.40 TYPE 2 DIABETES MELLITUS WITH DIABETIC NEUROPATHY, WITHOUT LONG-TERM CURRENT USE OF INSULIN (HCC): ICD-10-CM

## 2019-06-07 DIAGNOSIS — Z86.79 HX OF INTRACRANIAL HEMORRHAGE: ICD-10-CM

## 2019-06-07 DIAGNOSIS — Z86.73 HX OF COMPLETED STROKE: ICD-10-CM

## 2019-06-07 DIAGNOSIS — M17.11 PRIMARY OSTEOARTHRITIS OF RIGHT KNEE: ICD-10-CM

## 2019-06-07 DIAGNOSIS — I99.9 VASCULAR DISEASE: ICD-10-CM

## 2019-06-07 DIAGNOSIS — R29.898 WEAKNESS OF RIGHT FOOT: ICD-10-CM

## 2019-06-07 PROCEDURE — 97110 THERAPEUTIC EXERCISES: CPT

## 2019-06-07 NOTE — PROGRESS NOTES
Diagnosis: Lumbar radiculopathy (M54.16)         Next MD visit: 7/8/19  Fall Risk: standard         Precautions: n/a          Medication Changes since last visit?: No   Subjective: Patient reports 5/10 right hip pain today and 1-2/10 right foot pain.  Manfred ext 4 bands 2 x 10 (setting 4) - seated R ankle DF 2x20 reps  - seated R heel raises 3x10 reps  - supine R ankle DF 30x  - supine R ankle DF with RTB 3x10  - supine R SLR with 3# 3 x 10  - supine R SAQs with 5# 2 x 10 for 5 second holds  - seated R/L [facility] each   - bridges with blue t-band above knees 5\" holds 1 x 12   - supine R SLR 3\" holds 2 x 12 with GTB above knees   - sidelying R clam 1 x 15 with GTB above knee   - sidelying R hip abduction 10\" holds 1 x 15 (TC's to prevent trunk rotation)   - R sin bridging 3 x 10  - R single leg bridge 1 x 15   - supine R SLR 3\" holds 1 x 10 with RTB @ ankles  - sidelying R clam 10\" holds 1 x 12 with RTB above knee  - sidelying R hip abduction 3\" holds 3 x 10 (TC's to prevent trunk rotation)   - supine R SLR 5\"h bridging 1 x 10   - supine R SLR 3\" holds 1 x 10 with RTB just below knee   - sidelying R clam 10\" holds 1 x 10 with RTB above knee  - sidelying R hip abduction 3\" holds 1 x 12 (TC's to prevent trunk rotation)   - supine R SLR 5\"holds 1 x 15 reps with supine R/L HS stretch with towel 10x ea 5 sec holds  - supine carine curl up 2x10 ea  - NuStep L5 x5 minutes (LEs only)   Manual Therapy                        Therapeutic Activity                        Modalities

## 2019-06-11 ENCOUNTER — OFFICE VISIT (OUTPATIENT)
Dept: PHYSICAL THERAPY | Age: 64
End: 2019-06-11
Attending: PHYSICAL MEDICINE & REHABILITATION
Payer: COMMERCIAL

## 2019-06-11 DIAGNOSIS — Z86.73 HX OF COMPLETED STROKE: ICD-10-CM

## 2019-06-11 DIAGNOSIS — M53.3 SI (SACROILIAC) JOINT DYSFUNCTION: ICD-10-CM

## 2019-06-11 DIAGNOSIS — E11.40 TYPE 2 DIABETES MELLITUS WITH DIABETIC NEUROPATHY, WITHOUT LONG-TERM CURRENT USE OF INSULIN (HCC): ICD-10-CM

## 2019-06-11 DIAGNOSIS — I99.9 VASCULAR DISEASE: ICD-10-CM

## 2019-06-11 DIAGNOSIS — M54.16 LUMBAR RADICULOPATHY: ICD-10-CM

## 2019-06-11 DIAGNOSIS — R29.898 WEAKNESS OF RIGHT FOOT: ICD-10-CM

## 2019-06-11 DIAGNOSIS — Z86.79 HX OF INTRACRANIAL HEMORRHAGE: ICD-10-CM

## 2019-06-11 DIAGNOSIS — M17.11 PRIMARY OSTEOARTHRITIS OF RIGHT KNEE: ICD-10-CM

## 2019-06-11 PROCEDURE — 97110 THERAPEUTIC EXERCISES: CPT

## 2019-06-11 NOTE — PROGRESS NOTES
Diagnosis: Lumbar radiculopathy (M54.16)         Next MD visit: 7/8/19  Fall Risk: standard         Precautions: n/a          Medication Changes since last visit?: No   Subjective: Patient reports 0/10 right hip pain and 4/10 LBP today and 1-2/10 right tiffany (setting 4)  - shuttle machine R knee ext 4 bands 2 x 10 (setting 4) - seated R ankle DF 2x20 reps  - seated R heel raises 3x10 reps  - supine R ankle DF/PF with RTB 3 x 10 each  - supine R SLR with 4# 2 x 10  - supine R SAQs with 6.5# 5 seconds x 10   - s bridges 3x10  - supine R SLR with 4# 2x10  - supine R single leg bridges 3x10  - prone gluteal set 20x 5 sec holds  - sidelying R clams with GTB 2x10   - shuttle machine B knee ext 6 bands 3x10  - shuttle machine R knee ext 4 bands 2x10   - R L/E shuttle k 10  - R single leg bridge 3 x 10    - supine R SLR 3\" holds 1 x 12 with RTB @ ankles  - sidelying R clam 10\" holds 1 x 12 with RTB above knee  - sidelying R hip abduction 3\" holds 3 x 10 (TC's to prevent trunk rotation)   - supine R SLR 3 x 10 reps with - sidelying R clam 10\" holds 1 x 10 with RTB above knee  - sidelying R hip abduction 3\" holds 1 x 15 (TC's to prevent trunk rotation)   - supine R SLR 5\"holds 2 x 10 reps with 1.5# @ ankle  - R/L Charlotte curl up 1 x 10 each   - standing R hip extension 10        - seated R LAQ 5\" holds x 10   - standing R hip extension trunk flexed 1 x 10  - supine bridges with RTB above knees 2x10 5 sec holds  - supine R SLR with RTB above knees 2x10  - hooklying hip adduction with ball squeeze 20x 5 sec holds  - hookl

## 2019-06-14 ENCOUNTER — OFFICE VISIT (OUTPATIENT)
Dept: PHYSICAL THERAPY | Age: 64
End: 2019-06-14
Attending: PHYSICAL MEDICINE & REHABILITATION
Payer: COMMERCIAL

## 2019-06-14 DIAGNOSIS — M17.11 PRIMARY OSTEOARTHRITIS OF RIGHT KNEE: ICD-10-CM

## 2019-06-14 DIAGNOSIS — M54.16 LUMBAR RADICULOPATHY: ICD-10-CM

## 2019-06-14 DIAGNOSIS — I99.9 VASCULAR DISEASE: ICD-10-CM

## 2019-06-14 DIAGNOSIS — Z86.73 HX OF COMPLETED STROKE: ICD-10-CM

## 2019-06-14 DIAGNOSIS — R29.898 WEAKNESS OF RIGHT FOOT: ICD-10-CM

## 2019-06-14 DIAGNOSIS — E11.40 TYPE 2 DIABETES MELLITUS WITH DIABETIC NEUROPATHY, WITHOUT LONG-TERM CURRENT USE OF INSULIN (HCC): ICD-10-CM

## 2019-06-14 DIAGNOSIS — M53.3 SI (SACROILIAC) JOINT DYSFUNCTION: ICD-10-CM

## 2019-06-14 DIAGNOSIS — Z86.79 HX OF INTRACRANIAL HEMORRHAGE: ICD-10-CM

## 2019-06-14 PROCEDURE — 97110 THERAPEUTIC EXERCISES: CPT

## 2019-06-14 RX ORDER — LISINOPRIL 20 MG/1
TABLET ORAL
Qty: 90 TABLET | Refills: 0 | Status: SHIPPED | OUTPATIENT
Start: 2019-06-14 | End: 2019-09-10

## 2019-06-14 NOTE — PROGRESS NOTES
Diagnosis: Lumbar radiculopathy (M54.16)         Next MD visit: 7/8/19  Fall Risk: standard         Precautions: n/a          Medication Changes since last visit?: No   Subjective: Patient reports 5-6/10 right hip pain and 5-6/10 LBP today and 1-2/10 right abduction 3 x 10  - shuttle machine B knee ext 7 bands 2 x 10 (setting 4)  - shuttle machine R knee ext 4 bands 2 x 10 (setting 4) - seated R ankle DF 2 x 20 reps  - seated R heel raises 3 x 15 reps  - supine R ankle DF/PF with GTB 3 x 15 each  - supine R R hip abduction/hip extension 2.5# 2x10 ea  - seated R ankle DF 30x  - seated R heel raises 2x10   - supine DKTC with SB 20x  - supine SB bridges 3x10  - shuttle machine B knee ext 6 bands 3x10  - shuttle machine R knee ext 4 bands 2x10  - supine R/L [place] supine R SLR 3\" holds 1 x 12 with RTB @ ankles  - sidelying R clam 10\" holds 1 x 12 with RTB above knee  - sidelying R hip abduction 10\" holds 1 x 12 (TC's to prevent trunk rotation)   - supine R SLR 3 x 10 reps with 3# @ ankle  - R L/E shuttle knee ext - sidelying R clam 10\" holds 1 x 12 with RTB above knee  - sidelying R hip abduction 3\" holds 2 x 10 (TC's to prevent trunk rotation)   - supine R SLR 5\"holds 2 x 12 reps with 2.5# @ ankle  - R/L Charlotte curl up 1 x 15 each   - R L/E shuttle knee exten - supine R SLR 3\" holds x 10 with RTB above knee   - sidelying R clam 5\" holds 1 x 15 with RTB above knee  - sidelying R hip abduction 3\" holds 2 x 10 (TC's to prevent trunk rotation)  - supine R hip/knee flexion 1 x 10   - supine R SLR 5\"holds 2 x 5

## 2019-06-14 NOTE — TELEPHONE ENCOUNTER
Refill passed per [place], [facility] protocol. PCP to pls confim lisinopril instructions. Per LOV-1/14/19 pt to \"continue lisinopril 20mg\". However rx also states pt taking differently: 10mg daily.       Requested Prescriptions   Pending Prescriptions Disp R

## 2019-06-18 ENCOUNTER — OFFICE VISIT (OUTPATIENT)
Dept: PHYSICAL THERAPY | Age: 64
End: 2019-06-18
Attending: PHYSICAL MEDICINE & REHABILITATION
Payer: COMMERCIAL

## 2019-06-18 DIAGNOSIS — M53.3 SI (SACROILIAC) JOINT DYSFUNCTION: ICD-10-CM

## 2019-06-18 DIAGNOSIS — Z86.79 HX OF INTRACRANIAL HEMORRHAGE: ICD-10-CM

## 2019-06-18 DIAGNOSIS — I99.9 VASCULAR DISEASE: ICD-10-CM

## 2019-06-18 DIAGNOSIS — E11.40 TYPE 2 DIABETES MELLITUS WITH DIABETIC NEUROPATHY, WITHOUT LONG-TERM CURRENT USE OF INSULIN (HCC): ICD-10-CM

## 2019-06-18 DIAGNOSIS — M17.11 PRIMARY OSTEOARTHRITIS OF RIGHT KNEE: ICD-10-CM

## 2019-06-18 DIAGNOSIS — Z86.73 HX OF COMPLETED STROKE: ICD-10-CM

## 2019-06-18 DIAGNOSIS — R29.898 WEAKNESS OF RIGHT FOOT: ICD-10-CM

## 2019-06-18 DIAGNOSIS — M54.16 LUMBAR RADICULOPATHY: ICD-10-CM

## 2019-06-18 PROCEDURE — 97110 THERAPEUTIC EXERCISES: CPT

## 2019-06-18 NOTE — PROGRESS NOTES
Physical Therapy Discharge Summary  Diagnosis: Weakness of right foot (M21.41)  Vascular disease (I99.9)  Hx of intracranial hemorrhage (Z86.79)  SI (sacroiliac) joint dysfunction (M53.3)  Type 2 diabetes mellitus with diabetic neuropathy, without long-ter (EXP. 6/25/19) 4/17/19  Visit: 11/16 [employer] HMO (EXP. 6/25/19) 4/10/19  Visit: 10/16 [employer] HMO (EXP. 6/25/19) 4/5/19  Visit: 9/16 [employer] HMO (EXP. 6/25/19) 4/3/19  Visit: 8/16 Hayden Teague (EXP. 6/25/19) 3/29/19  Visit: 7/8 (HMO) 3/26/19  Visit: 6/8 (HMO) 3/19/19 raises 3x10 reps  - supine R ankle DF/PF with RTB 3 x 10 each  - supine R SLR with 4# 2 x 10  - supine R SAQs with 6.5# 5 seconds x 10   - supine R/L STKC 10x each  - supine SB bridges 3 x 10  - sidelying R hip abduction 3 x 10  - shuttle machine B knee ex sidelying R clams with GTB 2x10   - shuttle machine B knee ext 6 bands 3x10  - shuttle machine R knee ext 4 bands 2x10   - R L/E shuttle knee extension 4 bands 3 x 10 (setting 5)   - B L/E shuttle knee extension 6 bands 2 x 10 (seting 5)  - standing R hip 12 with RTB above knee  - sidelying R hip abduction 3\" holds 3 x 10 (TC's to prevent trunk rotation)   - supine R SLR 3 x 10 reps with 3# @ ankle  - R L/E shuttle knee extension 3 bands 3 x 10 (setting 5)   - standing R hip extension 2 x 10  - bridges wit trunk rotation)   - supine R SLR 5\"holds 2 x 10 reps with 1.5# @ ankle  - R/L [place] curl up 1 x 10 each   - standing R hip extension trunk flexed 2 x 10  - R L/E shuttle knee extension 3 bands 2 x 10 (setting 5)  - reassess x 5 minutes  - bridges with RT knees 2x10 5 sec holds  - supine R SLR with RTB above knees 2x10  - hooklying hip adduction with ball squeeze 20x 5 sec holds  - hooklying hip abd/ER with RTB 2x10 ea  - sidelying R clams with RTB above knees 2x10 5 sec holds  - sidelying R/L hip abduction

## 2019-06-25 ENCOUNTER — APPOINTMENT (OUTPATIENT)
Dept: PHYSICAL THERAPY | Age: 64
End: 2019-06-25
Attending: PHYSICAL MEDICINE & REHABILITATION
Payer: COMMERCIAL

## 2019-07-05 NOTE — TELEPHONE ENCOUNTER
Diabetes Medications  Protocol Criteria:  · Appointment scheduled in the past 6 months or the next 3 months  · A1C < 7.5 in the past 6 months  · Creatinine in the past 12 months  · Creatinine result < 1.5   Recent Outpatient Visits            2 weeks ago W

## 2019-07-08 ENCOUNTER — OFFICE VISIT (OUTPATIENT)
Dept: NEUROLOGY | Facility: CLINIC | Age: 64
End: 2019-07-08

## 2019-07-08 VITALS
BODY MASS INDEX: 25.2 KG/M2 | WEIGHT: 180 LBS | HEIGHT: 71 IN | RESPIRATION RATE: 17 BRPM | SYSTOLIC BLOOD PRESSURE: 130 MMHG | HEART RATE: 90 BPM | DIASTOLIC BLOOD PRESSURE: 62 MMHG

## 2019-07-08 DIAGNOSIS — R29.898 WEAKNESS OF RIGHT FOOT: ICD-10-CM

## 2019-07-08 DIAGNOSIS — M54.41 ACUTE RIGHT-SIDED LOW BACK PAIN WITH RIGHT-SIDED SCIATICA: ICD-10-CM

## 2019-07-08 DIAGNOSIS — G89.29 CHRONIC RIGHT-SIDED LOW BACK PAIN WITH RIGHT-SIDED SCIATICA: ICD-10-CM

## 2019-07-08 DIAGNOSIS — M53.3 SI (SACROILIAC) JOINT DYSFUNCTION: ICD-10-CM

## 2019-07-08 DIAGNOSIS — M54.41 CHRONIC RIGHT-SIDED LOW BACK PAIN WITH RIGHT-SIDED SCIATICA: ICD-10-CM

## 2019-07-08 DIAGNOSIS — M54.16 LUMBAR RADICULOPATHY: Primary | ICD-10-CM

## 2019-07-08 DIAGNOSIS — M48.061 FORAMINAL STENOSIS OF LUMBAR REGION: ICD-10-CM

## 2019-07-08 PROCEDURE — 99214 OFFICE O/P EST MOD 30 MIN: CPT | Performed by: PHYSICAL MEDICINE & REHABILITATION

## 2019-07-08 NOTE — PATIENT INSTRUCTIONS
My office will call you to schedule the RIGHT L5 and RIGHT S1 TFESI once the procedure is approved by your insurance carrier.       Follow up with me 2 weeks after the injection

## 2019-07-09 NOTE — PROGRESS NOTES
[address]  Progress Note    CHIEF COMPLAINT:  Patient presents with:  Low Back Pain: LOV 05/01/19 Pt states that PT didnt help he still has the pain in the lower back       History of Present Illness:   The folic acid 1 MG Oral Tab Take 1 tablet (1 mg total) by mouth daily.  Disp: 90 tablet Rfl: 3   TRIAMCINOLONE ACETONIDE 0.1 % External Cream APPLY EXTERNALLY TO THE AFFECTED AREA TWICE DAILY Disp: 80 g Rfl: 1   ASPIRIN EC LOW DOSE 81 MG Oral Tab EC 1 tablet flexion, 5 out of 5 with knee extension, 4- out of 5 with dorsiflexion, 3 out of 5 with great toe extension, and 3 out of 5 for plantarflexion  Sensation: Intact to light touch in all dermatomes of the lower extremities  Reflexes: 2/4 at L4 and S1     Gait 04/04/2019 420  240 - 450 ug/dL Final   • % Saturation 04/04/2019 20  20 - 50 % Final   • Folate (Folic Acid) 26/20/7260 6.8* >=8.7 ng/mL Final   • Vitamin B12 04/04/2019 117* 193 - 986 pg/mL Final   • WBC 04/04/2019 11.3* 4.0 - 11.0 x10(3) uL Final   • RB 9/11/2018, 14:48.     INDICATIONS:  Low back pain radiating down into right hip and foot. Worsening weakness in right leg/foot. No known injury.  History of stroke 5.     TECHNIQUE:    A variety of imaging planes and parameters were utilized for visualiz 59year old male who presents with RIGHT sided low back pain. We will be performing a RIGHT L5 and RIGHT S1 TFESI under MAC. He will follow up with me 2 weeks after the procedure. RTC in 2 weeks post procedure.   Discharge Instructions were provided

## 2019-07-15 ENCOUNTER — TELEPHONE (OUTPATIENT)
Dept: NEUROLOGY | Facility: CLINIC | Age: 64
End: 2019-07-15

## 2019-07-15 NOTE — TELEPHONE ENCOUNTER
Received in basket from MARY ALICE [email or web address]  advising of approval for RIGHT L5-S1 and RIGHT S1 TFESI for one unit/DOS . Will inform Nursing.

## 2019-07-16 NOTE — TELEPHONE ENCOUNTER
Patient has been scheduled for aRIGHT L5-S1 and RIGHT S1 TFESI On 07/24/19 at the [facility]. Medications and allergies reviewed. Patient informed to hold aspirins, nsaids, blood thinners, multivitamins, vitamin E and fish oils 3-7 days prior to procedure.   Safia Espinal

## 2019-07-24 ENCOUNTER — OFFICE VISIT (OUTPATIENT)
Dept: SURGERY | Facility: CLINIC | Age: 64
End: 2019-07-24

## 2019-07-24 DIAGNOSIS — M54.16 LUMBAR RADICULOPATHY, ACUTE: Primary | ICD-10-CM

## 2019-07-24 DIAGNOSIS — M54.59 MECHANICAL LOW BACK PAIN: ICD-10-CM

## 2019-07-24 DIAGNOSIS — M48.061 LUMBAR FORAMINAL STENOSIS: ICD-10-CM

## 2019-07-24 DIAGNOSIS — R29.898 RIGHT LEG WEAKNESS: ICD-10-CM

## 2019-07-24 PROCEDURE — 64484 NJX AA&/STRD TFRM EPI L/S EA: CPT | Performed by: PHYSICAL MEDICINE & REHABILITATION

## 2019-07-24 PROCEDURE — 64483 NJX AA&/STRD TFRM EPI L/S 1: CPT | Performed by: PHYSICAL MEDICINE & REHABILITATION

## 2019-07-24 NOTE — PATIENT INSTRUCTIONS
Post Injection Instructions     1. Please do not do anything strenuous over the next 24 hours (if you had a knee injection do not walk more than 2 city blocks, do not attend any aerobic classes, do not run, no heavy lifting, no prolong standing).   2. You m

## 2019-07-24 NOTE — PROCEDURES
[address]    2-LEVEL LUMBAR TRANSFORAMINAL   NAME:  Yan Vazquez    MR #:    [id number] :  1955     PHYSICIAN:  Fawad Medellin        Operative Report    DATE OF PROCEDURE: 2019   PREOPERATIVE DIAGNOSES: 1.  Lumbar r at each site. After this, the needles were removed. Each site was cleaned. Band-Aids were applied. The patient was transferred to the cart and into PAR. The patient was given discharge instructions and will follow up in the clinic as scheduled.   Royal

## 2019-08-07 ENCOUNTER — OFFICE VISIT (OUTPATIENT)
Dept: NEUROLOGY | Facility: CLINIC | Age: 64
End: 2019-08-07

## 2019-08-07 VITALS
HEART RATE: 86 BPM | SYSTOLIC BLOOD PRESSURE: 118 MMHG | DIASTOLIC BLOOD PRESSURE: 68 MMHG | BODY MASS INDEX: 25.2 KG/M2 | WEIGHT: 180 LBS | HEIGHT: 71 IN | RESPIRATION RATE: 17 BRPM

## 2019-08-07 DIAGNOSIS — M54.16 LUMBAR RADICULOPATHY: Primary | ICD-10-CM

## 2019-08-07 DIAGNOSIS — M54.41 CHRONIC RIGHT-SIDED LOW BACK PAIN WITH RIGHT-SIDED SCIATICA: ICD-10-CM

## 2019-08-07 DIAGNOSIS — M48.061 FORAMINAL STENOSIS OF LUMBAR REGION: ICD-10-CM

## 2019-08-07 DIAGNOSIS — R20.0 NUMBNESS IN FEET: ICD-10-CM

## 2019-08-07 DIAGNOSIS — Z86.73 HX OF COMPLETED STROKE: ICD-10-CM

## 2019-08-07 DIAGNOSIS — G89.29 CHRONIC RIGHT-SIDED LOW BACK PAIN WITH RIGHT-SIDED SCIATICA: ICD-10-CM

## 2019-08-07 DIAGNOSIS — E11.40 TYPE 2 DIABETES MELLITUS WITH DIABETIC NEUROPATHY, WITHOUT LONG-TERM CURRENT USE OF INSULIN (HCC): ICD-10-CM

## 2019-08-07 DIAGNOSIS — R29.898 WEAKNESS OF RIGHT FOOT: ICD-10-CM

## 2019-08-07 PROCEDURE — 99214 OFFICE O/P EST MOD 30 MIN: CPT | Performed by: PHYSICAL MEDICINE & REHABILITATION

## 2019-08-07 NOTE — PATIENT INSTRUCTIONS
My office will call you to schedule the RIGHT L5-S1 and RIGHT S1 TFESI under MAC once the procedure is approved by your insurance carrier.       Follow up with me 2 weeks after the injection

## 2019-08-07 NOTE — PROGRESS NOTES
[place]  Progress Note    CHIEF COMPLAINT:  Patient presents with:  Low Back Pain: LOV 07/24/19 Pt states that after his injections its getting better, it says its subsiding       History of Present Illne MOUTH TWICE DAILY WITH MEALS Disp: 180 tablet Rfl: 1   LISINOPRIL 20 MG Oral Tab TAKE 1 TABLET(20 MG) BY MOUTH DAILY FOR HIGH BLOOD PRESSURE Disp: 90 tablet Rfl: 0   ATORVASTATIN 40 MG Oral Tab TAKE 1 TABLET BY MOUTH NIGHTLY Disp: 90 tablet Rfl: 1   folic muscles, SI joints, prirformis muscle, glut's, greater trochanter bursa, or PSIS bilaterally  ROM: Full active range of motion of the lumbar spine  Motor: 5 out of 5 in all myotomes in the left lower extremity.  On the right, 4 out of 5 with hip flexion, 5 Antigen Screen 04/04/2019 0.88  <=4.00 ng/mL Final   • Ferritin 04/04/2019 98.7  30.0 - 530.0 ng/mL Final   • Iron 04/04/2019 83  65 - 175 ug/dL Final   • Transferrin 04/04/2019 282  200 - 360 mg/dL Final   • Total Iron Binding Capacity 04/04/2019 420  240 ]      Radiology Imaging:  I reviewed with the patient his MRI of the lumbar spine from 10/26/18  PROCEDURE:   [address] (CPT=72148)     [facility] 2nd Floor, XR LUMBAR SPINE AP LAT (CPT=72100), 9/11/2018, 14:48 lumbar pedicles, contributing to lumbar stenosis.      Dictated by (CST): Dominic Mensah MD on 10/26/2018 at 12:52       Approved by (CST): Dominic Mensah MD on 10/26/2018 at 13:01             ASSESSMENT AND PLAN:  The patient is a pleasant 60-year-o

## 2019-08-12 ENCOUNTER — TELEPHONE (OUTPATIENT)
Dept: NEUROLOGY | Facility: CLINIC | Age: 64
End: 2019-08-12

## 2019-08-12 NOTE — TELEPHONE ENCOUNTER
Patient has been scheduled for a right L5-S1 and right [id number]  on 09/13/19 at the [address] [place] [address]. Medications and allergies reviewed. Patient informed to hold aspirins, nsaids, blood thinners, vitamins and fish oils 5-7 days prior to procedure.  Metformin to be held

## 2019-08-12 NOTE — TELEPHONE ENCOUNTER
Received fax from MARY ALICE [email or web address]  advising of approval for RIGHT L5-S1 and RIGHT S1 TFESI for one unit/DOS. Will  Inform Nursing.

## 2019-08-24 ENCOUNTER — HOSPITAL ENCOUNTER (OUTPATIENT)
Facility: HOSPITAL | Age: 64
Setting detail: OBSERVATION
Discharge: HOME | End: 2019-08-25
Attending: EMERGENCY MEDICINE | Admitting: HOSPITALIST
Payer: COMMERCIAL

## 2019-08-24 ENCOUNTER — APPOINTMENT (OUTPATIENT)
Dept: GENERAL RADIOLOGY | Facility: HOSPITAL | Age: 64
End: 2019-08-24
Attending: EMERGENCY MEDICINE
Payer: COMMERCIAL

## 2019-08-24 ENCOUNTER — NURSE TRIAGE (OUTPATIENT)
Dept: OTHER | Age: 64
End: 2019-08-24

## 2019-08-24 DIAGNOSIS — E87.1 HYPONATREMIA: ICD-10-CM

## 2019-08-24 DIAGNOSIS — R07.9 ACUTE CHEST PAIN: Primary | ICD-10-CM

## 2019-08-24 LAB
ANION GAP SERPL CALC-SCNC: 10 MMOL/L (ref 0–18)
BASOPHILS # BLD AUTO: 0.05 X10(3) UL (ref 0–0.2)
BASOPHILS NFR BLD AUTO: 0.5 %
BUN BLD-MCNC: 15 MG/DL (ref 7–18)
BUN/CREAT SERPL: 17.9 (ref 10–20)
CALCIUM BLD-MCNC: 9.5 MG/DL (ref 8.5–10.1)
CHLORIDE SERPL-SCNC: 95 MMOL/L (ref 98–112)
CHOLEST SMN-MCNC: 96 MG/DL (ref ?–200)
CO2 SERPL-SCNC: 24 MMOL/L (ref 21–32)
CREAT BLD-MCNC: 0.84 MG/DL (ref 0.7–1.3)
DEPRECATED RDW RBC AUTO: 39 FL (ref 35.1–46.3)
EOSINOPHIL # BLD AUTO: 0.45 X10(3) UL (ref 0–0.7)
EOSINOPHIL NFR BLD AUTO: 4.8 %
ERYTHROCYTE [DISTWIDTH] IN BLOOD BY AUTOMATED COUNT: 12.5 % (ref 11–15)
EST. AVERAGE GLUCOSE BLD GHB EST-MCNC: 143 MG/DL (ref 68–126)
GLUCOSE BLD-MCNC: 121 MG/DL (ref 70–99)
GLUCOSE BLDC GLUCOMTR-MCNC: 127 MG/DL (ref 70–99)
GLUCOSE BLDC GLUCOMTR-MCNC: 152 MG/DL (ref 70–99)
GLUCOSE BLDC GLUCOMTR-MCNC: 99 MG/DL (ref 70–99)
HBA1C MFR BLD HPLC: 6.6 % (ref ?–5.7)
HCT VFR BLD AUTO: 34.8 % (ref 39–53)
HDLC SERPL-MCNC: 37 MG/DL (ref 40–59)
HGB BLD-MCNC: 12.2 G/DL (ref 13–17.5)
IMM GRANULOCYTES # BLD AUTO: 0.03 X10(3) UL (ref 0–1)
IMM GRANULOCYTES NFR BLD: 0.3 %
LDLC SERPL CALC-MCNC: 41 MG/DL (ref ?–100)
LYMPHOCYTES # BLD AUTO: 2.3 X10(3) UL (ref 1–4)
LYMPHOCYTES NFR BLD AUTO: 24.3 %
MCH RBC QN AUTO: 30 PG (ref 26–34)
MCHC RBC AUTO-ENTMCNC: 35.1 G/DL (ref 31–37)
MCV RBC AUTO: 85.7 FL (ref 80–100)
MONOCYTES # BLD AUTO: 0.74 X10(3) UL (ref 0.1–1)
MONOCYTES NFR BLD AUTO: 7.8 %
NEUTROPHILS # BLD AUTO: 5.88 X10 (3) UL (ref 1.5–7.7)
NEUTROPHILS # BLD AUTO: 5.88 X10(3) UL (ref 1.5–7.7)
NEUTROPHILS NFR BLD AUTO: 62.3 %
NONHDLC SERPL-MCNC: 59 MG/DL (ref ?–130)
OSMOLALITY SERPL CALC.SUM OF ELEC: 270 MOSM/KG (ref 275–295)
PLATELET # BLD AUTO: 314 10(3)UL (ref 150–450)
POTASSIUM SERPL-SCNC: 3.9 MMOL/L (ref 3.5–5.1)
RBC # BLD AUTO: 4.06 X10(6)UL (ref 4.3–5.7)
SODIUM SERPL-SCNC: 129 MMOL/L (ref 136–145)
TRIGL SERPL-MCNC: 92 MG/DL (ref 30–149)
TROPONIN I SERPL-MCNC: <0.045 NG/ML (ref ?–0.04)
TROPONIN I SERPL-MCNC: <0.045 NG/ML (ref ?–0.04)
VLDLC SERPL CALC-MCNC: 18 MG/DL (ref 0–30)
WBC # BLD AUTO: 9.5 X10(3) UL (ref 4–11)

## 2019-08-24 PROCEDURE — 99219 INITIAL OBSERVATION CARE,LEVL II: CPT | Performed by: HOSPITALIST

## 2019-08-24 PROCEDURE — 71046 X-RAY EXAM CHEST 2 VIEWS: CPT | Performed by: EMERGENCY MEDICINE

## 2019-08-24 RX ORDER — DEXTROSE MONOHYDRATE 25 G/50ML
50 INJECTION, SOLUTION INTRAVENOUS AS NEEDED
Status: DISCONTINUED | OUTPATIENT
Start: 2019-08-24 | End: 2019-08-25

## 2019-08-24 RX ORDER — ASPIRIN 81 MG/1
81 TABLET ORAL DAILY
Status: DISCONTINUED | OUTPATIENT
Start: 2019-08-25 | End: 2019-08-25

## 2019-08-24 RX ORDER — FOLIC ACID 1 MG/1
1 TABLET ORAL DAILY
Status: DISCONTINUED | OUTPATIENT
Start: 2019-08-24 | End: 2019-08-25

## 2019-08-24 RX ORDER — ASPIRIN 81 MG/1
324 TABLET, CHEWABLE ORAL ONCE
Status: COMPLETED | OUTPATIENT
Start: 2019-08-24 | End: 2019-08-24

## 2019-08-24 RX ORDER — LISINOPRIL 20 MG/1
20 TABLET ORAL DAILY
Status: DISCONTINUED | OUTPATIENT
Start: 2019-08-24 | End: 2019-08-25

## 2019-08-24 RX ORDER — ATORVASTATIN CALCIUM 40 MG/1
40 TABLET, FILM COATED ORAL NIGHTLY
Status: DISCONTINUED | OUTPATIENT
Start: 2019-08-24 | End: 2019-08-25

## 2019-08-24 RX ORDER — HEPARIN SODIUM 5000 [USP'U]/ML
5000 INJECTION, SOLUTION INTRAVENOUS; SUBCUTANEOUS EVERY 8 HOURS SCHEDULED
Status: DISCONTINUED | OUTPATIENT
Start: 2019-08-24 | End: 2019-08-25

## 2019-08-24 RX ORDER — ACETAMINOPHEN 325 MG/1
650 TABLET ORAL EVERY 6 HOURS PRN
Status: DISCONTINUED | OUTPATIENT
Start: 2019-08-24 | End: 2019-08-25

## 2019-08-24 RX ORDER — SODIUM CHLORIDE 0.9 % (FLUSH) 0.9 %
3 SYRINGE (ML) INJECTION AS NEEDED
Status: DISCONTINUED | OUTPATIENT
Start: 2019-08-24 | End: 2019-08-25

## 2019-08-24 NOTE — PLAN OF CARE
Problem: Diabetes/Glucose Control  Goal: Glucose maintained within prescribed range  Description  INTERVENTIONS:  - Monitor Blood Glucose as ordered  - Assess for signs and symptoms of hyperglycemia and hypoglycemia  - Administer ordered medications to m Monitor arterial and/or venous puncture sites for bleeding and/or hematoma  - Assess quality of pulses, skin color and temperature  - Assess for signs of decreased coronary artery perfusion - ex.  Angina  - Evaluate fluid balance, assess for edema, trend we

## 2019-08-24 NOTE — TELEPHONE ENCOUNTER
Action Requested: Summary for Provider     []  Critical Lab, Recommendations Needed  [] Need Additional Advice  []   FYI    []   Need Orders  [] Need Medications Sent to Pharmacy  []  Other     SUMMARY: patient stated, \"I have been having left arm dull ac

## 2019-08-24 NOTE — ED PROVIDER NOTES
Patient Seen in: [facility] AND [facility] Emergency Department    History   Patient presents with:  Chest Pain Angina (cardiovascular)    Stated Complaint: chest pain x2-3 days    HPI    79-year-old male with past medical history significant for high blood pres (Room air)       Current:/81   Pulse 70   Temp 97.9 °F (36.6 °C) (Temporal)   Resp 16   Ht 180.3 cm (5' 11\")   Wt 81.6 kg   SpO2 98%   BMI 25.10 kg/m²         Physical Exam    Physical Exam   Constitutional: AAOx3, well nourished, NAD  Head: Normoce Please view results for these tests on the individual orders. RAINBOW DRAW BLUE   RAINBOW DRAW LAVENDER   RAINBOW DRAW LIGHT GREEN   RAINBOW DRAW GOLD     EKG    Rate, intervals and axes as noted on EKG Report.   Rate: 80 bpm  Rhythm: Sinus Rhythm  Re

## 2019-08-24 NOTE — ED NOTES
Pt presents for eval of intermittent left-sided chest tightness associated with tingling to the LUE x2-3 days. Pt denies any SOB.  Dr. Tez Vital at the bedside for further eval.

## 2019-08-24 NOTE — H&P
[facility] Patient Status:  Observation    1955 MRN [id number]   Location [facility] 3W/SW Attending Asia Vang MD   Hosp Day # 0 PCP None Pcp     Date:  2019  Date o Take 1 tablet (1 mg total) by mouth daily. TRIAMCINOLONE ACETONIDE 0.1 % External Cream APPLY EXTERNALLY TO THE AFFECTED AREA TWICE DAILY       Review of Systems:     A comprehensive 12 point review of systems was completed.   Pertinent positives and nega Interpretation  --------------------------       Assessment/Plan:     Acute chest pain  Likely non-cardiac but given risk factors will order stress  EKG and troponin x 2 are non-acute  Cont asa, statin     HTN  Cont ACE-I     DMII  Cont BS monitoring  Slid

## 2019-08-25 ENCOUNTER — APPOINTMENT (OUTPATIENT)
Dept: NUCLEAR MEDICINE | Facility: HOSPITAL | Age: 64
End: 2019-08-25
Attending: HOSPITALIST
Payer: COMMERCIAL

## 2019-08-25 ENCOUNTER — APPOINTMENT (OUTPATIENT)
Dept: CV DIAGNOSTICS | Facility: HOSPITAL | Age: 64
End: 2019-08-25
Attending: HOSPITALIST
Payer: COMMERCIAL

## 2019-08-25 VITALS
DIASTOLIC BLOOD PRESSURE: 67 MMHG | BODY MASS INDEX: 23.94 KG/M2 | HEART RATE: 68 BPM | TEMPERATURE: 98 F | OXYGEN SATURATION: 99 % | WEIGHT: 171 LBS | RESPIRATION RATE: 18 BRPM | SYSTOLIC BLOOD PRESSURE: 133 MMHG | HEIGHT: 71 IN

## 2019-08-25 LAB
ANION GAP SERPL CALC-SCNC: 8 MMOL/L (ref 0–18)
BASOPHILS # BLD AUTO: 0.05 X10(3) UL (ref 0–0.2)
BASOPHILS NFR BLD AUTO: 0.6 %
BUN BLD-MCNC: 19 MG/DL (ref 7–18)
BUN/CREAT SERPL: 18.4 (ref 10–20)
CALCIUM BLD-MCNC: 9.4 MG/DL (ref 8.5–10.1)
CHLORIDE SERPL-SCNC: 99 MMOL/L (ref 98–112)
CO2 SERPL-SCNC: 27 MMOL/L (ref 21–32)
CREAT BLD-MCNC: 1.03 MG/DL (ref 0.7–1.3)
DEPRECATED RDW RBC AUTO: 39.5 FL (ref 35.1–46.3)
EOSINOPHIL # BLD AUTO: 0.45 X10(3) UL (ref 0–0.7)
EOSINOPHIL NFR BLD AUTO: 5.6 %
ERYTHROCYTE [DISTWIDTH] IN BLOOD BY AUTOMATED COUNT: 12.5 % (ref 11–15)
GLUCOSE BLD-MCNC: 125 MG/DL (ref 70–99)
GLUCOSE BLDC GLUCOMTR-MCNC: 167 MG/DL (ref 70–99)
HAV IGM SER QL: 2 MG/DL (ref 1.6–2.6)
HCT VFR BLD AUTO: 35.4 % (ref 39–53)
HGB BLD-MCNC: 12.4 G/DL (ref 13–17.5)
IMM GRANULOCYTES # BLD AUTO: 0.03 X10(3) UL (ref 0–1)
IMM GRANULOCYTES NFR BLD: 0.4 %
LYMPHOCYTES # BLD AUTO: 2.27 X10(3) UL (ref 1–4)
LYMPHOCYTES NFR BLD AUTO: 28.4 %
MCH RBC QN AUTO: 30.2 PG (ref 26–34)
MCHC RBC AUTO-ENTMCNC: 35 G/DL (ref 31–37)
MCV RBC AUTO: 86.1 FL (ref 80–100)
MONOCYTES # BLD AUTO: 0.77 X10(3) UL (ref 0.1–1)
MONOCYTES NFR BLD AUTO: 9.6 %
NEUTROPHILS # BLD AUTO: 4.41 X10 (3) UL (ref 1.5–7.7)
NEUTROPHILS # BLD AUTO: 4.41 X10(3) UL (ref 1.5–7.7)
NEUTROPHILS NFR BLD AUTO: 55.4 %
OSMOLALITY SERPL CALC.SUM OF ELEC: 282 MOSM/KG (ref 275–295)
PATIENT FASTING: YES
PLATELET # BLD AUTO: 303 10(3)UL (ref 150–450)
POTASSIUM SERPL-SCNC: 4.8 MMOL/L (ref 3.5–5.1)
RBC # BLD AUTO: 4.11 X10(6)UL (ref 4.3–5.7)
SODIUM SERPL-SCNC: 134 MMOL/L (ref 136–145)
WBC # BLD AUTO: 8 X10(3) UL (ref 4–11)

## 2019-08-25 PROCEDURE — 93017 CV STRESS TEST TRACING ONLY: CPT | Performed by: HOSPITALIST

## 2019-08-25 PROCEDURE — 78452 HT MUSCLE IMAGE SPECT MULT: CPT | Performed by: HOSPITALIST

## 2019-08-25 PROCEDURE — 99217 OBSERVATION CARE DISCHARGE: CPT | Performed by: HOSPITALIST

## 2019-08-25 RX ORDER — 0.9 % SODIUM CHLORIDE 0.9 %
VIAL (ML) INJECTION
Status: COMPLETED
Start: 2019-08-25 | End: 2019-08-25

## 2019-08-25 NOTE — PLAN OF CARE
Vitals stable. SR on tele. Stress test completed. Denies chest pain.    Problem: Diabetes/Glucose Control  Goal: Glucose maintained within prescribed range  Description  INTERVENTIONS:  - Monitor Blood Glucose as ordered  - Assess for signs and symptoms of effectiveness of vasoactive medications to optimize hemodynamic stability  - Monitor arterial and/or venous puncture sites for bleeding and/or hematoma  - Assess quality of pulses, skin color and temperature  - Assess for signs of decreased coronary artery

## 2019-08-25 NOTE — DISCHARGE SUMMARY
[facility]    Discharge Summary    Jennifer Molina Patient Status:  Observation    1955 MRN [id number]   Location [facility] 3W/SW Attending Blas Garcia MD   Hosp Day # 0 PCP None Pcp     Date of Admission:  bruits. Respiratory: Clear to auscultation bilaterally. No wheezes. No rhonchi. Cardiovascular: S1, S2.  Regular rate and rhythm. No murmurs. Equal pulses   Abdomen: Soft, nontender, nondistended. Positive bowel sounds.  No rebound tenderness  Neurolog DAILY FOR HIGH BLOOD PRESSURE   Quantity:  90 tablet  Refills:  0     metFORMIN HCl 1000 MG Tabs  Commonly known as:  GLUCOPHAGE      TAKE 1 TABLET(1000 MG) BY MOUTH TWICE DAILY WITH MEALS   Quantity:  180 tablet  Refills:  1     triamcinolone acetonide 0.

## 2019-08-26 NOTE — TELEPHONE ENCOUNTER
Was admitted OBV at [address] [place] [address] on 8/24/19. No schedulle yet. IMedExchangehart message sent. CSS=please call and assists with FY OV.              Follow-Ups: Follow up with PRIMARY CARE PHYSICIAN in 1 week (9/1/2019)

## 2019-08-30 ENCOUNTER — OFFICE VISIT (OUTPATIENT)
Dept: INTERNAL MEDICINE CLINIC | Facility: CLINIC | Age: 64
End: 2019-08-30

## 2019-08-30 VITALS
WEIGHT: 184 LBS | HEIGHT: 71 IN | HEART RATE: 80 BPM | BODY MASS INDEX: 25.76 KG/M2 | SYSTOLIC BLOOD PRESSURE: 127 MMHG | DIASTOLIC BLOOD PRESSURE: 72 MMHG | RESPIRATION RATE: 20 BRPM

## 2019-08-30 DIAGNOSIS — F17.200 CURRENT SMOKER: ICD-10-CM

## 2019-08-30 DIAGNOSIS — R07.89 ATYPICAL CHEST PAIN: Primary | ICD-10-CM

## 2019-08-30 PROCEDURE — 99214 OFFICE O/P EST MOD 30 MIN: CPT | Performed by: INTERNAL MEDICINE

## 2019-08-30 NOTE — PROGRESS NOTES
HPI:    Patient ID: Harish Leonard is a 59year old male. Chief Complaint: Er F/u (Patient present for ER Follow Up - Chest pain)      Chest Pain    This is a new problem. Episode onset: 1 episode 8/24/19- ER course reviewed: trop negative x2.  The on visual disturbance. Respiratory: Negative for cough, sputum production, shortness of breath and wheezing. Cardiovascular: Positive for chest pain. Negative for palpitations, orthopnea, claudication, leg swelling, syncope, PND and near-syncope.    Brody Mojica GFRNAA 76 08/25/2019    GFRAA 88 08/25/2019    CA 9.4 08/25/2019    OSMOCALC 282 08/25/2019    ALKPHO 71 11/27/2017    AST 19 04/04/2019    ALT 32 04/04/2019    ALKPHOS 69 02/17/2014    BILT 1.5 (H) 11/27/2017    TP 7.6 11/27/2017    ALB 4.5 11/27/2017 Foraminal stenosis of lumbar region     Mechanical low back pain     Right leg weakness     Numbness in feet     Acute chest pain     Reviewed:  Past Medical History:   Diagnosis Date   • Basal cell adenoma 2014    base of nose / L medial eye area   • [place] PF MYOCARD PERFUSION GATE, 10/25/2015, 7:42. [place], [place] PF MYOCARD PERFUSION GATE, 12/16/2013, 7:38. [place], [place] PF MYOCARD PERFUSION GATE, 5/22/2007, 10:43.     INDICATIONS: Left chest pain, diabetes,, shortness of controlled, troponin negative x2, EKG normal  - could be a little gastritis?   - continue ASA + statin + ace + metformin  - continue healthy diet and lifestyle  - if symptoms recur, go to ER, can consider cardiology evaluation for second opinion.        Sonja Quinones

## 2019-09-03 ENCOUNTER — TELEPHONE (OUTPATIENT)
Dept: NEUROLOGY | Facility: CLINIC | Age: 64
End: 2019-09-03

## 2019-09-03 ENCOUNTER — OFFICE VISIT (OUTPATIENT)
Dept: INTERNAL MEDICINE CLINIC | Facility: CLINIC | Age: 64
End: 2019-09-03

## 2019-09-03 ENCOUNTER — NURSE TRIAGE (OUTPATIENT)
Dept: INTERNAL MEDICINE CLINIC | Facility: CLINIC | Age: 64
End: 2019-09-03

## 2019-09-03 VITALS
HEIGHT: 71 IN | DIASTOLIC BLOOD PRESSURE: 64 MMHG | BODY MASS INDEX: 25.2 KG/M2 | SYSTOLIC BLOOD PRESSURE: 126 MMHG | HEART RATE: 91 BPM | TEMPERATURE: 98 F | WEIGHT: 180 LBS

## 2019-09-03 DIAGNOSIS — H92.11 EAR DISCHARGE OF RIGHT EAR: Primary | ICD-10-CM

## 2019-09-03 DIAGNOSIS — H60.391 OTHER INFECTIVE ACUTE OTITIS EXTERNA OF RIGHT EAR: ICD-10-CM

## 2019-09-03 PROCEDURE — 99213 OFFICE O/P EST LOW 20 MIN: CPT | Performed by: INTERNAL MEDICINE

## 2019-09-03 NOTE — TELEPHONE ENCOUNTER
Called and left a detailed voice mail message that we have to change the Pt's appointment to 9/16/19 at 11 am. Also asked if the Pt didn't have any concerns with the change date he didn't have to call back.

## 2019-09-03 NOTE — PROGRESS NOTES
Patient ID: Tony Givens is a 59year old male. Patient presents with: Other: Pt states he had creamt color discharge in right ear. HISTORY OF PRESENT ILLNESS:   HPI  Patient presents for above.   Having right ear discharge for the past 5 d children: Not on file      Years of education: Not on file      Highest education level: Not on file    Occupational History      Not on file    Social Needs      Financial resource strain: Not on file      Food insecurity:        Worry: Not on file Bike Helmet: Not Asked        Seat Belt: Not Asked        Self-Exams: Not Asked    Social History Narrative      The patient uses the following assistive device none. The patient does live in a home with stairs.           PHYSICAL EXAM:      09/0

## 2019-09-03 NOTE — TELEPHONE ENCOUNTER
Action Requested: Summary for Provider     []  Critical Lab, Recommendations Needed  [] Need Additional Advice  []   FYI    []   Need Orders  [] Need Medications Sent to Pharmacy  []  Other     SUMMARY: patient c/o thin white drainge from R ear for about t

## 2019-09-10 ENCOUNTER — OFFICE VISIT (OUTPATIENT)
Dept: INTERNAL MEDICINE CLINIC | Facility: CLINIC | Age: 64
End: 2019-09-10

## 2019-09-10 VITALS
WEIGHT: 179.81 LBS | TEMPERATURE: 97 F | HEART RATE: 79 BPM | SYSTOLIC BLOOD PRESSURE: 123 MMHG | DIASTOLIC BLOOD PRESSURE: 70 MMHG | BODY MASS INDEX: 25 KG/M2

## 2019-09-10 DIAGNOSIS — E78.5 DYSLIPIDEMIA ASSOCIATED WITH TYPE 2 DIABETES MELLITUS (HCC): ICD-10-CM

## 2019-09-10 DIAGNOSIS — E11.9 TYPE 2 DIABETES MELLITUS WITHOUT COMPLICATION, WITHOUT LONG-TERM CURRENT USE OF INSULIN (HCC): Primary | ICD-10-CM

## 2019-09-10 DIAGNOSIS — I15.2 HYPERTENSION ASSOCIATED WITH DIABETES (HCC): ICD-10-CM

## 2019-09-10 DIAGNOSIS — E11.69 DYSLIPIDEMIA ASSOCIATED WITH TYPE 2 DIABETES MELLITUS (HCC): ICD-10-CM

## 2019-09-10 DIAGNOSIS — E11.59 HYPERTENSION ASSOCIATED WITH DIABETES (HCC): ICD-10-CM

## 2019-09-10 PROCEDURE — 99214 OFFICE O/P EST MOD 30 MIN: CPT | Performed by: INTERNAL MEDICINE

## 2019-09-10 RX ORDER — ATORVASTATIN CALCIUM 40 MG/1
40 TABLET, FILM COATED ORAL NIGHTLY
Qty: 90 TABLET | Refills: 3 | Status: SHIPPED | OUTPATIENT
Start: 2019-09-10 | End: 2019-12-09 | Stop reason: WASHOUT

## 2019-09-10 RX ORDER — LISINOPRIL 20 MG/1
20 TABLET ORAL DAILY
Qty: 90 TABLET | Refills: 3 | Status: SHIPPED | OUTPATIENT
Start: 2019-09-10 | End: 2019-12-09 | Stop reason: WASHOUT

## 2019-09-10 NOTE — PROGRESS NOTES
HPI:    Patient ID: Michael Barnard is a 59year old male. Chief Complaint: Diabetes (fup )      Diabetes   He presents for his follow-up diabetic visit. He has type 2 diabetes mellitus. His disease course has been stable.  There are no hypoglycemic as dental problem, ear discharge, ear pain and hearing loss. Eyes: Negative for visual disturbance. Respiratory: Negative for cough, shortness of breath and wheezing. Cardiovascular: Negative for chest pain, palpitations and leg swelling.    Evorn Muckle GFRAA 88 08/25/2019    CA 9.4 08/25/2019    OSMOCALC 282 08/25/2019    ALKPHO 71 11/27/2017    AST 19 04/04/2019    ALT 32 04/04/2019    ALKPHOS 69 02/17/2014    BILT 1.5 (H) 11/27/2017    TP 7.6 11/27/2017    ALB 4.5 11/27/2017    GLOBULIN 3.1 11/27/20 Foraminal stenosis of lumbar region     Mechanical low back pain     Right leg weakness     Numbness in feet     Acute chest pain     Dyslipidemia associated with type 2 diabetes mellitus ([facility] [address]     Reviewed:  Past Medical History:   Diagnosis Date   • Manuel side effects. He is compliant, continue metformin. Continue atorvastatin and lisinopril, see below. Reinforced diet and exercise, low carbohydrate diet. 2. Hypertension associated with diabetes ([address]  - lisinopril 20 MG Oral Tab;  Take 1 tablet (20 mg tablet (1,000 mg total) by mouth 2 (two) times daily with meals. Dispense:  180 tablet          Refill:  3      atorvastatin 40 MG Oral Tab          Sig: Take 1 tablet (40 mg total) by mouth nightly.           Dispense:  90 tablet          Refill:

## 2019-09-12 ENCOUNTER — TELEPHONE (OUTPATIENT)
Dept: OTHER | Age: 64
End: 2019-09-12

## 2019-09-12 NOTE — TELEPHONE ENCOUNTER
Wife (DIOMEDES) calling back, asking if they still need to make a follow up office visit, patient seen Dr Brit Kuhn on 8/30 for FU ER visit, advised that no need to make an appointment.

## 2019-09-16 ENCOUNTER — OFFICE VISIT (OUTPATIENT)
Dept: SURGERY | Facility: CLINIC | Age: 64
End: 2019-09-16

## 2019-09-16 DIAGNOSIS — R29.898 RIGHT LEG WEAKNESS: ICD-10-CM

## 2019-09-16 DIAGNOSIS — M54.16 LUMBAR RADICULOPATHY, ACUTE: Primary | ICD-10-CM

## 2019-09-16 DIAGNOSIS — M48.061 LUMBAR FORAMINAL STENOSIS: ICD-10-CM

## 2019-09-16 DIAGNOSIS — M54.16 RIGHT LUMBAR RADICULOPATHY: ICD-10-CM

## 2019-09-16 PROCEDURE — 64484 NJX AA&/STRD TFRM EPI L/S EA: CPT | Performed by: PHYSICAL MEDICINE & REHABILITATION

## 2019-09-16 PROCEDURE — 64483 NJX AA&/STRD TFRM EPI L/S 1: CPT | Performed by: PHYSICAL MEDICINE & REHABILITATION

## 2019-09-16 NOTE — PROCEDURES
[address] [facility] [address]    2-LEVEL LUMBAR TRANSFORAMINAL   NAME:  Oly Boyle    MR #:    [id number] :  1955     PHYSICIAN:  Sandee Harmon        Operative Report    DATE OF PROCEDURE: 2019   PREOPERATIVE DIAGNOSES: 1.  Lumbar r epinephrine at each site. After this, the needles were removed. Each site was cleaned. Band-Aids were applied. The patient was transferred to the cart and into PAR.   The patient was given discharge instructions and will follow up in the clinic as sched

## 2019-10-04 ENCOUNTER — OFFICE VISIT (OUTPATIENT)
Dept: NEUROLOGY | Facility: CLINIC | Age: 64
End: 2019-10-04

## 2019-10-04 VITALS
DIASTOLIC BLOOD PRESSURE: 52 MMHG | WEIGHT: 179 LBS | HEIGHT: 71 IN | SYSTOLIC BLOOD PRESSURE: 134 MMHG | RESPIRATION RATE: 18 BRPM | BODY MASS INDEX: 25.06 KG/M2

## 2019-10-04 DIAGNOSIS — M25.561 CHRONIC PAIN OF RIGHT KNEE: Primary | ICD-10-CM

## 2019-10-04 DIAGNOSIS — M54.59 MECHANICAL LOW BACK PAIN: ICD-10-CM

## 2019-10-04 DIAGNOSIS — Z86.73 HX OF COMPLETED STROKE: ICD-10-CM

## 2019-10-04 DIAGNOSIS — M48.061 FORAMINAL STENOSIS OF LUMBAR REGION: ICD-10-CM

## 2019-10-04 DIAGNOSIS — M54.41 CHRONIC RIGHT-SIDED LOW BACK PAIN WITH RIGHT-SIDED SCIATICA: ICD-10-CM

## 2019-10-04 DIAGNOSIS — G89.29 CHRONIC RIGHT-SIDED LOW BACK PAIN WITH RIGHT-SIDED SCIATICA: ICD-10-CM

## 2019-10-04 DIAGNOSIS — M47.816 FACET SYNDROME, LUMBAR: ICD-10-CM

## 2019-10-04 DIAGNOSIS — I10 ESSENTIAL HYPERTENSION: ICD-10-CM

## 2019-10-04 DIAGNOSIS — R20.0 NUMBNESS IN FEET: ICD-10-CM

## 2019-10-04 DIAGNOSIS — R29.898 WEAKNESS OF RIGHT FOOT: ICD-10-CM

## 2019-10-04 DIAGNOSIS — M47.816 LUMBAR SPONDYLOSIS: ICD-10-CM

## 2019-10-04 DIAGNOSIS — G89.29 CHRONIC PAIN OF RIGHT KNEE: Primary | ICD-10-CM

## 2019-10-04 DIAGNOSIS — M51.37 DDD (DEGENERATIVE DISC DISEASE), LUMBOSACRAL: ICD-10-CM

## 2019-10-04 DIAGNOSIS — M54.16 LUMBAR RADICULOPATHY: ICD-10-CM

## 2019-10-04 DIAGNOSIS — M54.16 RIGHT LUMBAR RADICULOPATHY: ICD-10-CM

## 2019-10-04 PROBLEM — M51.379 DDD (DEGENERATIVE DISC DISEASE), LUMBOSACRAL: Status: ACTIVE | Noted: 2019-10-04

## 2019-10-04 PROCEDURE — 99214 OFFICE O/P EST MOD 30 MIN: CPT | Performed by: PHYSICAL MEDICINE & REHABILITATION

## 2019-10-04 NOTE — PROGRESS NOTES
[address]  Progress Note    CHIEF COMPLAINT:  Patient presents with:  Hip Pain: LOV 09/16/19 Pt states that he is still having pain in his right hip. He states that nothing has changed.  Pt had an injection Current Outpatient Medications:  metFORMIN HCl 1000 MG Oral Tab Take 1 tablet (1,000 mg total) by mouth 2 (two) times daily with meals. Disp: 180 tablet Rfl: 3   atorvastatin 40 MG Oral Tab Take 1 tablet (40 mg total) by mouth nightly.  Disp: 90 tablet bilaterally  ROM: Full active range of motion of the lumbar spine  Motor: 5 out of 5 in all myotomes in the left lower extremity.  On the right, 4 out of 5 with hip flexion, 5 out of 5 with knee extension, 4- out of 5 with dorsiflexion, 3 out of 5 with gre Final   • MCHC 08/24/2019 35.1  31.0 - 37.0 g/dL Final   • RDW-SD 08/24/2019 39.0  35.1 - 46.3 fL Final   • RDW 08/24/2019 12.5  11.0 - 15.0 % Final   • PLT 08/24/2019 314.0  150.0 - 450.0 10(3)uL Final   • Neutrophil Absolute Prelim 08/24/2019 5.88  1.50 mg/dL Final   • BUN/CREA Ratio 08/25/2019 18.4  10.0 - 20.0 Final   • Calcium, Total 08/25/2019 9.4  8.5 - 10.1 mg/dL Final   • Calculated Osmolality 08/25/2019 282  275 - 295 mOsm/kg Final   • GFR, Non- 08/25/2019 76  >=60 Final   • GFR, A back pain radiating down into right hip and foot. Worsening weakness in right leg/foot. No known injury.  History of stroke 5.     TECHNIQUE:    A variety of imaging planes and parameters were utilized for visualization of suspected pathology.     BESSY status post right L5 and right S1 transforaminal epidural steroid injection on 9/16/2019. At this time I am recommending we hold off from performing any further injections until January 2020.   I would like for him to obtain x-rays of the right knee as I a

## 2019-10-04 NOTE — PATIENT INSTRUCTIONS
1) Get XR of the RIGHT knee on your way out  2) Follow up with me in January. We can discuss injections again at that point.  No injections until then  3) Continue with your home exercise program

## 2019-10-09 ENCOUNTER — TELEPHONE (OUTPATIENT)
Dept: NEUROLOGY | Facility: CLINIC | Age: 64
End: 2019-10-09

## 2019-10-09 ENCOUNTER — HOSPITAL ENCOUNTER (OUTPATIENT)
Dept: GENERAL RADIOLOGY | Age: 64
Discharge: HOME | End: 2019-10-09
Attending: PHYSICAL MEDICINE & REHABILITATION
Payer: COMMERCIAL

## 2019-10-09 ENCOUNTER — MED REC SCAN ONLY (OUTPATIENT)
Dept: NEUROLOGY | Facility: CLINIC | Age: 64
End: 2019-10-09

## 2019-10-09 DIAGNOSIS — M25.561 CHRONIC PAIN OF RIGHT KNEE: ICD-10-CM

## 2019-10-09 DIAGNOSIS — M51.37 DDD (DEGENERATIVE DISC DISEASE), LUMBOSACRAL: ICD-10-CM

## 2019-10-09 DIAGNOSIS — R20.0 NUMBNESS IN FEET: ICD-10-CM

## 2019-10-09 DIAGNOSIS — Z86.73 HX OF COMPLETED STROKE: ICD-10-CM

## 2019-10-09 DIAGNOSIS — M48.061 FORAMINAL STENOSIS OF LUMBAR REGION: ICD-10-CM

## 2019-10-09 DIAGNOSIS — M54.16 RIGHT LUMBAR RADICULOPATHY: ICD-10-CM

## 2019-10-09 DIAGNOSIS — M54.59 MECHANICAL LOW BACK PAIN: ICD-10-CM

## 2019-10-09 DIAGNOSIS — M47.816 FACET SYNDROME, LUMBAR: ICD-10-CM

## 2019-10-09 DIAGNOSIS — M54.41 CHRONIC RIGHT-SIDED LOW BACK PAIN WITH RIGHT-SIDED SCIATICA: ICD-10-CM

## 2019-10-09 DIAGNOSIS — M47.816 LUMBAR SPONDYLOSIS: ICD-10-CM

## 2019-10-09 DIAGNOSIS — G89.29 CHRONIC RIGHT-SIDED LOW BACK PAIN WITH RIGHT-SIDED SCIATICA: ICD-10-CM

## 2019-10-09 DIAGNOSIS — M54.16 LUMBAR RADICULOPATHY: ICD-10-CM

## 2019-10-09 DIAGNOSIS — G89.29 CHRONIC PAIN OF RIGHT KNEE: ICD-10-CM

## 2019-10-09 DIAGNOSIS — I10 ESSENTIAL HYPERTENSION: ICD-10-CM

## 2019-10-09 DIAGNOSIS — R29.898 WEAKNESS OF RIGHT FOOT: ICD-10-CM

## 2019-10-09 PROCEDURE — 73564 X-RAY EXAM KNEE 4 OR MORE: CPT | Performed by: PHYSICAL MEDICINE & REHABILITATION

## 2019-10-09 NOTE — TELEPHONE ENCOUNTER
Paper work has been filled out by Dr Gillian Starks.    Forms have been placed up front in the folder for  and a copy made to be sent to scanning

## 2019-10-12 ENCOUNTER — TELEPHONE (OUTPATIENT)
Dept: INTERNAL MEDICINE CLINIC | Facility: CLINIC | Age: 64
End: 2019-10-12

## 2019-10-12 DIAGNOSIS — G89.29 CHRONIC PAIN OF RIGHT KNEE: Primary | ICD-10-CM

## 2019-10-12 DIAGNOSIS — M25.561 CHRONIC PAIN OF RIGHT KNEE: Primary | ICD-10-CM

## 2019-10-12 NOTE — TELEPHONE ENCOUNTER
Pt's wife is currently in office for appt and asked if pt can have referral to see Dr. Nany Dumont. Referral pending. LOV 9/10/19.

## 2019-10-29 ENCOUNTER — TELEPHONE (OUTPATIENT)
Dept: NEUROLOGY | Facility: CLINIC | Age: 64
End: 2019-10-29

## 2019-11-12 ENCOUNTER — OFFICE VISIT (OUTPATIENT)
Dept: INTERNAL MEDICINE CLINIC | Facility: CLINIC | Age: 64
End: 2019-11-12

## 2019-11-12 VITALS
BODY MASS INDEX: 26 KG/M2 | WEIGHT: 183.19 LBS | SYSTOLIC BLOOD PRESSURE: 145 MMHG | TEMPERATURE: 96 F | DIASTOLIC BLOOD PRESSURE: 68 MMHG | HEART RATE: 93 BPM

## 2019-11-12 DIAGNOSIS — J02.9 SORE THROAT: ICD-10-CM

## 2019-11-12 DIAGNOSIS — H66.001 ACUTE SUPPURATIVE OTITIS MEDIA OF RIGHT EAR WITHOUT SPONTANEOUS RUPTURE OF TYMPANIC MEMBRANE, RECURRENCE NOT SPECIFIED: Primary | ICD-10-CM

## 2019-11-12 DIAGNOSIS — Z12.83 SKIN CANCER SCREENING: ICD-10-CM

## 2019-11-12 DIAGNOSIS — L91.8 SKIN TAG: ICD-10-CM

## 2019-11-12 PROCEDURE — 87880 STREP A ASSAY W/OPTIC: CPT | Performed by: INTERNAL MEDICINE

## 2019-11-12 PROCEDURE — 99214 OFFICE O/P EST MOD 30 MIN: CPT | Performed by: INTERNAL MEDICINE

## 2019-11-12 RX ORDER — AMOXICILLIN AND CLAVULANATE POTASSIUM 875; 125 MG/1; MG/1
1 TABLET, FILM COATED ORAL 2 TIMES DAILY
Qty: 20 TABLET | Refills: 0 | Status: SHIPPED | OUTPATIENT
Start: 2019-11-12 | End: 2019-11-22

## 2019-11-12 NOTE — PATIENT INSTRUCTIONS
Reducing the Risk of Middle Ear Infections     Good handwashing can help your child prevent ear infections. Most children have had at least one middle ear infection by the age of 2. Treatment may depend on whether the problem is acute or chronic.  It · During this time, your child should be watched to see if his or her symptoms are improving and to make sure new symptoms, such as fever or vomiting, don't develop.  If a child doesn't improve within a few days or develops new symptoms, antibiotics will us · Ear pain gets worse or does not improve after 3 days of treatment  · Unusual drowsiness or confusion  · Neck pain, stiff neck, or headache  · Fluid or blood draining from the ear canal  · Fever of 100.4°F (38°C) or as advised   · Seizure  Date Last Revie · Remember: unless a sore throat is caused by a bacterial infection, antibiotics won’t help you. Prevent future sore throats  Prevention tips include the following:  · Stop smoking or reduce contact with secondhand smoke.  Smoke irritates the tender throat Talk to your pediatrician regarding the use of this medicine in children. Special care may be needed. What side effects may I notice from receiving this medicine?   Side effects that you should report to your doctor or health care professional as soon as p Tell your doctor or health care professional if your symptoms do not improve. Do not treat diarrhea with over the counter products. Contact your doctor if you have diarrhea that lasts more than 2 days or if it is severe and watery.   If you have diabetes,

## 2019-11-12 NOTE — PROGRESS NOTES
History of Present Illness   Patient ID: Willma Gitelman is a [age] old male. Chief Complaint: Derm Problem (c/o mole on left side of neck. ) and Sore Throat (c/o right side of glands hurt.)      Sore Throat    This is a new problem.  Episode onset: Right Ear: Tympanic membrane is erythematous (discomfort wiht speculum insertion). No cerumen present  Left Ear: Tympanic membrane normal. Tympanic membrane is not erythematous. No cerumen present  Mouth/Throat: Pharynx erythema present.    Eyes: Conjunctiv RDW 12.5 08/25/2019    .0 08/25/2019    MPV 7.4 01/08/2019     Lab Results   Component Value Date    CHOLEST 96 08/24/2019    TRIG 92 08/24/2019    HDL 37 (L) 08/24/2019    LDL 41 08/24/2019    VLDL 18 08/24/2019    NONHDLC 59 08/24/2019    CALCNON • Other and unspecified hyperlipidemia       Reviewed:  History reviewed. No pertinent family history.     Reviewed:  Past Surgical History:   Procedure Laterality Date   • EXCIS LUMBAR DISK,ONE LEVEL     • OTHER SURGICAL HISTORY        Reviewed:  Social Hi In terms of his skin tag, since its not bothersome recommended monitoring.   He does have a few moles which I recommend be evaluated by dermatology to ensure these moles are not cancerous, he declines at this time, will give referral in case he changes his · If your child goes to group , he or she runs a greater risk of getting colds or flu. This may then lead to an ear infection. Help prevent these illnesses by teaching your child to wash his or her hands often.   · If your child has nasal allergies, You have an infection of the middle ear, the space behind the eardrum. This is also called acute otitis media (AOM). Sometimes it is caused by the common cold.  This is because congestion can block the internal passage (eustachian tube) that drains fluid fr Sore throats happen for many reasons, such as colds, allergies, and infections caused by viruses or bacteria. In any case, your throat becomes red and sore.  Your goal for self-care is to reduce your discomfort while giving your throat a chance to heal.  Mo · White spots on the throat  · Great difficulty swallowing  · Trouble breathing  · A skin rash  · Recent exposure to someone else with strep bacteria  · Severe hoarseness and swollen glands in the neck or jaw  Date Last Reviewed: 8/1/2016 © 2000-2019 The · white patches or sores in the mouth or throat  Side effects that usually do not require medical attention (report to your doctor or health care professional if they continue or are bothersome):  · diarrhea  · dizziness  · headache  · nausea, vomiting  · NOTE:This sheet is a summary. It may not cover all possible information. If you have questions about this medicine, talk to your doctor, pharmacist, or health care provider.  Copyright© 2019 [employer]

## 2019-11-25 ENCOUNTER — NURSE ONLY (OUTPATIENT)
Dept: INTERNAL MEDICINE CLINIC | Facility: CLINIC | Age: 64
End: 2019-11-25

## 2019-11-25 DIAGNOSIS — Z23 NEED FOR VACCINATION: Primary | ICD-10-CM

## 2019-11-25 PROCEDURE — 90471 IMMUNIZATION ADMIN: CPT | Performed by: INTERNAL MEDICINE

## 2019-11-25 PROCEDURE — 90686 IIV4 VACC NO PRSV 0.5 ML IM: CPT | Performed by: INTERNAL MEDICINE

## 2019-12-11 ENCOUNTER — OFFICE VISIT (OUTPATIENT)
Dept: INTERNAL MEDICINE CLINIC | Facility: CLINIC | Age: 64
End: 2019-12-11

## 2019-12-11 VITALS
HEART RATE: 80 BPM | TEMPERATURE: 98 F | SYSTOLIC BLOOD PRESSURE: 147 MMHG | DIASTOLIC BLOOD PRESSURE: 79 MMHG | BODY MASS INDEX: 26 KG/M2 | OXYGEN SATURATION: 99 % | WEIGHT: 184 LBS

## 2019-12-11 DIAGNOSIS — F17.200 CURRENT SMOKER ON SOME DAYS: ICD-10-CM

## 2019-12-11 DIAGNOSIS — Z01.00 ROUTINE EYE EXAM: ICD-10-CM

## 2019-12-11 DIAGNOSIS — J06.9 UPPER RESPIRATORY TRACT INFECTION, UNSPECIFIED TYPE: Primary | ICD-10-CM

## 2019-12-11 PROCEDURE — 99213 OFFICE O/P EST LOW 20 MIN: CPT | Performed by: INTERNAL MEDICINE

## 2019-12-11 RX ORDER — AZITHROMYCIN 250 MG/1
TABLET, FILM COATED ORAL
Qty: 6 TABLET | Refills: 0 | Status: SHIPPED | OUTPATIENT
Start: 2019-12-11 | End: 2020-01-17

## 2019-12-11 NOTE — PROGRESS NOTES
History of Present Illness   Patient ID: Jeanna Alonso is a 59year old male. Chief Complaint: Cough (c/o cold. ) and Shoulder Pain (Right. mainly in the morning. )      Cough   This is a new problem.  Episode onset: wife tested positived for [employer] normal.   Lymphadenopathy:        Head (right side): No submental, no submandibular and no tonsillar adenopathy present. Head (left side): No submental, no submandibular and no tonsillar adenopathy present.    Neurological: He is alert and oriented t Dyslipidemia associated with type 2 diabetes mellitus (HCC)      Acute chest pain      Numbness in feet      Mechanical low back pain      Right leg weakness      Foraminal stenosis of lumbar region      Primary osteoarthritis of right knee      Vascular d Tab EC, 1 tablet daily. , Disp: , Rfl: 1       Assessment & Plan    1. Upper respiratory tract infection, unspecified type  - azithromycin 250 MG Oral Tab; Take two tablets by mouth today, then one daily. Dispense: 6 tablet; Refill: 0    2.  Current smoke

## 2020-01-17 ENCOUNTER — APPOINTMENT (OUTPATIENT)
Dept: LAB | Age: 65
End: 2020-01-17
Attending: INTERNAL MEDICINE
Payer: COMMERCIAL

## 2020-01-17 ENCOUNTER — OFFICE VISIT (OUTPATIENT)
Dept: INTERNAL MEDICINE CLINIC | Facility: CLINIC | Age: 65
End: 2020-01-17

## 2020-01-17 VITALS
WEIGHT: 182 LBS | HEART RATE: 81 BPM | DIASTOLIC BLOOD PRESSURE: 65 MMHG | SYSTOLIC BLOOD PRESSURE: 122 MMHG | TEMPERATURE: 97 F | BODY MASS INDEX: 25 KG/M2

## 2020-01-17 DIAGNOSIS — M54.16 LUMBAR RADICULOPATHY: ICD-10-CM

## 2020-01-17 DIAGNOSIS — E11.69 DYSLIPIDEMIA ASSOCIATED WITH TYPE 2 DIABETES MELLITUS (HCC): ICD-10-CM

## 2020-01-17 DIAGNOSIS — M25.561 CHRONIC PAIN OF RIGHT KNEE: ICD-10-CM

## 2020-01-17 DIAGNOSIS — M54.41 ACUTE RIGHT-SIDED LOW BACK PAIN WITH RIGHT-SIDED SCIATICA: ICD-10-CM

## 2020-01-17 DIAGNOSIS — E11.9 TYPE 2 DIABETES MELLITUS WITHOUT COMPLICATION, WITHOUT LONG-TERM CURRENT USE OF INSULIN (HCC): ICD-10-CM

## 2020-01-17 DIAGNOSIS — G89.29 CHRONIC PAIN OF RIGHT KNEE: ICD-10-CM

## 2020-01-17 DIAGNOSIS — M25.552 BILATERAL HIP PAIN: ICD-10-CM

## 2020-01-17 DIAGNOSIS — E78.5 DYSLIPIDEMIA ASSOCIATED WITH TYPE 2 DIABETES MELLITUS (HCC): ICD-10-CM

## 2020-01-17 DIAGNOSIS — E11.9 TYPE 2 DIABETES MELLITUS WITHOUT COMPLICATION, WITHOUT LONG-TERM CURRENT USE OF INSULIN (HCC): Primary | ICD-10-CM

## 2020-01-17 DIAGNOSIS — M25.551 BILATERAL HIP PAIN: ICD-10-CM

## 2020-01-17 DIAGNOSIS — I10 HYPERTENSION GOAL BP (BLOOD PRESSURE) < 130/80: ICD-10-CM

## 2020-01-17 PROBLEM — R20.0 NUMBNESS IN FEET: Status: RESOLVED | Noted: 2019-08-07 | Resolved: 2020-01-17

## 2020-01-17 LAB
ANION GAP SERPL CALC-SCNC: 7 MMOL/L (ref 0–18)
BUN BLD-MCNC: 13 MG/DL (ref 7–18)
BUN/CREAT SERPL: 14.4 (ref 10–20)
CALCIUM BLD-MCNC: 9.6 MG/DL (ref 8.5–10.1)
CHLORIDE SERPL-SCNC: 93 MMOL/L (ref 98–112)
CO2 SERPL-SCNC: 25 MMOL/L (ref 21–32)
CREAT BLD-MCNC: 0.9 MG/DL (ref 0.7–1.3)
EST. AVERAGE GLUCOSE BLD GHB EST-MCNC: 140 MG/DL (ref 68–126)
GLUCOSE BLD-MCNC: 110 MG/DL (ref 70–99)
HBA1C MFR BLD HPLC: 6.5 % (ref ?–5.7)
OSMOLALITY SERPL CALC.SUM OF ELEC: 261 MOSM/KG (ref 275–295)
PATIENT FASTING Y/N/NP: NO
POTASSIUM SERPL-SCNC: 4.5 MMOL/L (ref 3.5–5.1)
SODIUM SERPL-SCNC: 125 MMOL/L (ref 136–145)

## 2020-01-17 PROCEDURE — 99214 OFFICE O/P EST MOD 30 MIN: CPT | Performed by: INTERNAL MEDICINE

## 2020-01-17 PROCEDURE — 83036 HEMOGLOBIN GLYCOSYLATED A1C: CPT

## 2020-01-17 PROCEDURE — 80048 BASIC METABOLIC PNL TOTAL CA: CPT

## 2020-01-17 PROCEDURE — 36415 COLL VENOUS BLD VENIPUNCTURE: CPT

## 2020-01-17 RX ORDER — ATORVASTATIN CALCIUM 40 MG/1
TABLET, FILM COATED ORAL
COMMUNITY
Start: 2019-12-11 | End: 2020-01-17

## 2020-01-17 RX ORDER — ATORVASTATIN CALCIUM 40 MG/1
40 TABLET, FILM COATED ORAL NIGHTLY
Qty: 90 TABLET | Refills: 3 | Status: SHIPPED | OUTPATIENT
Start: 2020-01-17 | End: 2020-08-26

## 2020-01-17 RX ORDER — LISINOPRIL 20 MG/1
20 TABLET ORAL NIGHTLY
Qty: 90 TABLET | Refills: 3 | Status: SHIPPED | OUTPATIENT
Start: 2020-01-17 | End: 2020-08-26

## 2020-01-17 RX ORDER — LISINOPRIL 20 MG/1
TABLET ORAL
COMMUNITY
Start: 2019-12-11 | End: 2020-01-17

## 2020-01-17 NOTE — PROGRESS NOTES
History of Present Illness   Patient ID: Abi Adams is a 59year old male. Chief Complaint: Diabetes ([facility] would like labs. ) and Referral (Dr. Moy Baker would like 5 visits or more.)      Diabetes   He presents for his follow-up diabetic visit. wound.   Neurological: Negative for light-headedness and headaches. Physical Exam   01/17/20  0947   BP: 122/65   Pulse: 81   Temp: 97.3 °F (36.3 °C)   TempSrc: Tympanic   Weight: 182 lb (82.6 kg)     Body mass index is 25.38 kg/m².   BP Readings f 08/25/2019    ALKPHO 71 11/27/2017    AST 19 04/04/2019    ALT 32 04/04/2019    ALKPHOS 69 02/17/2014    BILT 1.5 (H) 11/27/2017    TP 7.6 11/27/2017    ALB 4.5 11/27/2017    GLOBULIN 3.1 11/27/2017     (L) 08/25/2019    K 4.8 08/25/2019    CL 99 08/ foot      Bilateral hip pain      Folliculitis      Rash      Hypertension goal BP (blood pressure) < 130/80      Hx of intracranial hemorrhage      Former smoker      Hx of cervical spine surgery            In 2016, spinal fusion surgery. Dr Bev Ochoa. (blood pressure) < 130/80  - lisinopril 20 MG Oral Tab; Take 1 tablet (20 mg total) by mouth nightly. FOR BLOOD PRESSURE. Dispense: 90 tablet; Refill: 3  Plan  Well-controlled on 20 mg lisinopril, continue nightly.      3. Dyslipidemia associated with type

## 2020-01-19 DIAGNOSIS — Z86.79 HX OF INTRACRANIAL HEMORRHAGE: ICD-10-CM

## 2020-01-19 DIAGNOSIS — E87.1 HYPONATREMIA: Primary | ICD-10-CM

## 2020-01-29 ENCOUNTER — OFFICE VISIT (OUTPATIENT)
Dept: NEUROLOGY | Facility: CLINIC | Age: 65
End: 2020-01-29

## 2020-01-29 ENCOUNTER — OFFICE VISIT (OUTPATIENT)
Dept: NEPHROLOGY | Facility: CLINIC | Age: 65
End: 2020-01-29

## 2020-01-29 VITALS
HEIGHT: 71 IN | BODY MASS INDEX: 24.92 KG/M2 | TEMPERATURE: 97 F | HEART RATE: 82 BPM | WEIGHT: 178 LBS | SYSTOLIC BLOOD PRESSURE: 86 MMHG | DIASTOLIC BLOOD PRESSURE: 51 MMHG

## 2020-01-29 VITALS
BODY MASS INDEX: 25.2 KG/M2 | HEIGHT: 71 IN | WEIGHT: 180 LBS | SYSTOLIC BLOOD PRESSURE: 114 MMHG | DIASTOLIC BLOOD PRESSURE: 60 MMHG

## 2020-01-29 DIAGNOSIS — M21.861: ICD-10-CM

## 2020-01-29 DIAGNOSIS — M17.11 PRIMARY OSTEOARTHRITIS OF RIGHT KNEE: ICD-10-CM

## 2020-01-29 DIAGNOSIS — M22.2X9 PATELLOFEMORAL PAIN SYNDROME, UNSPECIFIED LATERALITY: Primary | ICD-10-CM

## 2020-01-29 DIAGNOSIS — M25.561 CHRONIC PAIN OF RIGHT KNEE: ICD-10-CM

## 2020-01-29 DIAGNOSIS — G89.29 CHRONIC PAIN OF RIGHT KNEE: ICD-10-CM

## 2020-01-29 DIAGNOSIS — E87.1 HYPONATREMIA: ICD-10-CM

## 2020-01-29 DIAGNOSIS — Z87.891 PERSONAL HISTORY OF TOBACCO USE, PRESENTING HAZARDS TO HEALTH: Primary | ICD-10-CM

## 2020-01-29 PROCEDURE — 99244 OFF/OP CNSLTJ NEW/EST MOD 40: CPT | Performed by: INTERNAL MEDICINE

## 2020-01-29 PROCEDURE — 99214 OFFICE O/P EST MOD 30 MIN: CPT | Performed by: PHYSICAL MEDICINE & REHABILITATION

## 2020-01-29 NOTE — PROGRESS NOTES
Consult Requested By: Dr. Teresita Stuart     Reason for Consult: Hyponatremia     HPI:     Patient is a 59 yrs old male with pmh of chronic hyponatremia, DM II x 10 yrs, ICH x 1979 s/p evacuation of blood clot, HTN who presented for work up and evaluation mouth nightly. FOR BLOOD PRESSURE. 90 tablet 3   • ASPIRIN EC LOW DOSE 81 MG Oral Tab EC 1 tablet daily.     1       Allergies:  No Known Allergies      ROS:     Constitutional:  Negative for decreased activity, fever, irritability and lethargy  ENMT:  Nega - low dose given history of chronic smoking   - CXR no acute changes from last few months   - repeat serum Na     Follow up in 3 weeks     Total time > 35 mins     Orders This Visit:  Orders Placed This Encounter      Osmolality, Urine [E]      Sodium, Rossana Palencia

## 2020-01-29 NOTE — PROGRESS NOTES
[address]  Progress Note    CHIEF COMPLAINT:  Patient presents with:  Lower Extremity Injury: Patient presents for right hip.  Patient states that his hip is much better but is having a lot of pain on right Oral Tab Take 1 tablet (1,000 mg total) by mouth 2 (two) times daily with meals. FOR DIABETES. 180 tablet 3   • atorvastatin 40 MG Oral Tab Take 1 tablet (40 mg total) by mouth nightly. FOR CHOLESTEROL.  90 tablet 3   • lisinopril 20 MG Oral Tab Take 1 tabl extremity.  On the right, 4 out of 5 with hip flexion, 5 out of 5 with knee extension, 4- out of 5 with dorsiflexion, 3 out of 5 with great toe extension, and 3 out of 5 for plantarflexion  Sensation: Intact to light touch in all dermatomes of the lower ex Hold Blue 08/24/2019 Auto Resulted   Final   • Hold Lavender 08/24/2019 Auto Resulted   Final   • Hold Lt Green 08/24/2019 Auto Resulted   Final   • Hold Gold 08/24/2019 Auto Resulted   Final   • Glucose 08/24/2019 121* 70 - 99 mg/dL Final   • Sodium 08/24 Final   • Lymphocyte % 08/24/2019 24.3  % Final   • Monocyte % 08/24/2019 7.8  % Final   • Eosinophil % 08/24/2019 4.8  % Final   • Basophil % 08/24/2019 0.5  % Final   • Immature Granulocyte % 08/24/2019 0.3  % Final   • POC Glucose  08/24/2019 127* 70 - 08/25/2019 39.5  35.1 - 46.3 fL Final   • RDW 08/25/2019 12.5  11.0 - 15.0 % Final   • PLT 08/25/2019 303.0  150.0 - 450.0 10(3)uL Final   • Neutrophil Absolute Prelim 08/25/2019 4.41  1.50 - 7.70 x10 (3) uL Final   • Neutrophil Absolute 08/25/2019 4.41  1 by (CST): Cleo Sánchez MD on 10/09/2019 at 14:46       Approved by (CST): Cleo Sánchez MD on 10/09/2019 at 14:49              ASSESSMENT AND PLAN:  The patient is a 43-year-old male who is coming in complaining of right knee pain.   He does have medial comp

## 2020-01-29 NOTE — PATIENT INSTRUCTIONS
Reduce lisinopril to 10 mg daily   Bring home blood pressure readings on next visit   Urine test as ordered   Monitor liquid intake 60 ounces a day   CT chest as ordered - call to make an appointment   Follow up in 3 weeks

## 2020-01-29 NOTE — PATIENT INSTRUCTIONS
1) My office will call you to schedule the RIGHT HA and CSI under ultrasound guidance once the procedure is approved by your insurance carrier.     2) Restart physical therapy with Zeenat Badillo and Susi  3) Continue using the brace  4) Continue with your home exercis

## 2020-01-30 ENCOUNTER — TELEPHONE (OUTPATIENT)
Dept: NEUROLOGY | Facility: CLINIC | Age: 65
End: 2020-01-30

## 2020-01-30 NOTE — TELEPHONE ENCOUNTER
Patient came in with forms at his ov on 01/30/2020 . [facility] out by Dr. Mg Mcmillan and placed in front office for . Left patient a detailed message in voicemail to inform.

## 2020-01-31 ENCOUNTER — TELEPHONE (OUTPATIENT)
Dept: NEUROLOGY | Facility: CLINIC | Age: 65
End: 2020-01-31

## 2020-01-31 NOTE — TELEPHONE ENCOUNTER
Received in basket from [email or web address] advising of approval for physical therapy. Will call Pt. To inform. L/m advising 8 PT sessions were approved. Can proceed with scheduling appt.

## 2020-02-03 ENCOUNTER — APPOINTMENT (OUTPATIENT)
Dept: LAB | Facility: HOSPITAL | Age: 65
End: 2020-02-03
Attending: INTERNAL MEDICINE
Payer: COMMERCIAL

## 2020-02-03 ENCOUNTER — HOSPITAL ENCOUNTER (OUTPATIENT)
Dept: CT IMAGING | Facility: HOSPITAL | Age: 65
Discharge: HOME | End: 2020-02-03
Attending: INTERNAL MEDICINE
Payer: COMMERCIAL

## 2020-02-03 DIAGNOSIS — Z87.891 PERSONAL HISTORY OF TOBACCO USE, PRESENTING HAZARDS TO HEALTH: ICD-10-CM

## 2020-02-03 DIAGNOSIS — E87.1 HYPONATREMIA: ICD-10-CM

## 2020-02-03 LAB
ANION GAP SERPL CALC-SCNC: 8 MMOL/L (ref 0–18)
BUN BLD-MCNC: 17 MG/DL (ref 7–18)
BUN/CREAT SERPL: 15.5 (ref 10–20)
CALCIUM BLD-MCNC: 9.5 MG/DL (ref 8.5–10.1)
CHLORIDE SERPL-SCNC: 98 MMOL/L (ref 98–112)
CO2 SERPL-SCNC: 25 MMOL/L (ref 21–32)
CREAT BLD-MCNC: 1.1 MG/DL (ref 0.7–1.3)
GLUCOSE BLD-MCNC: 144 MG/DL (ref 70–99)
OSMOLALITY SERPL CALC.SUM OF ELEC: 276 MOSM/KG (ref 275–295)
OSMOLALITY UR: 633 MOSM/KG (ref 300–1100)
PATIENT FASTING Y/N/NP: NO
POTASSIUM SERPL-SCNC: 4.4 MMOL/L (ref 3.5–5.1)
SODIUM SERPL-SCNC: 106 MMOL/L
SODIUM SERPL-SCNC: 131 MMOL/L (ref 136–145)

## 2020-02-03 PROCEDURE — 80048 BASIC METABOLIC PNL TOTAL CA: CPT

## 2020-02-03 PROCEDURE — 84300 ASSAY OF URINE SODIUM: CPT

## 2020-02-03 PROCEDURE — 36415 COLL VENOUS BLD VENIPUNCTURE: CPT

## 2020-02-03 PROCEDURE — 83935 ASSAY OF URINE OSMOLALITY: CPT

## 2020-02-28 ENCOUNTER — OFFICE VISIT (OUTPATIENT)
Dept: NEPHROLOGY | Facility: CLINIC | Age: 65
End: 2020-02-28

## 2020-02-28 VITALS
DIASTOLIC BLOOD PRESSURE: 56 MMHG | SYSTOLIC BLOOD PRESSURE: 112 MMHG | TEMPERATURE: 97 F | HEIGHT: 71 IN | WEIGHT: 180 LBS | BODY MASS INDEX: 25.2 KG/M2 | HEART RATE: 78 BPM

## 2020-02-28 DIAGNOSIS — E87.1 HYPONATREMIA: Primary | ICD-10-CM

## 2020-02-28 PROCEDURE — 99213 OFFICE O/P EST LOW 20 MIN: CPT | Performed by: INTERNAL MEDICINE

## 2020-02-28 NOTE — PROGRESS NOTES
Progress Note:      Patient is a 59 yrs old male with pmh of chronic hyponatremia, DM II x 10 yrs, ICH x 1979 s/p evacuation of blood clot, HTN who presented for follow up     Lab test BUN/Cr 13/0.9 mg/dl 90 ml/min.  Serum sodium chronically low atleast PRESSURE. 90 tablet 3   • ASPIRIN EC LOW DOSE 81 MG Oral Tab EC 1 tablet daily.     1       Allergies:  No Known Allergies      ROS:     Constitutional:  Negative for decreased activity, fever, irritability and lethargy  ENMT:  Negative for ear drainage, he history of chronic smoking.  Negative for malignancy  - CXR no acute changes from last few months   - serum creatinine stable at 1.1 mg/dl with an eGFR 71 ml/min with negative urine albumin     Follow up in 6 months      Orders This Visit:  Orders Placed Th

## 2020-03-05 ENCOUNTER — TELEPHONE (OUTPATIENT)
Dept: INTERNAL MEDICINE CLINIC | Facility: CLINIC | Age: 65
End: 2020-03-05

## 2020-03-05 ENCOUNTER — HOSPITAL ENCOUNTER (OUTPATIENT)
Dept: GENERAL RADIOLOGY | Age: 65
Discharge: HOME | End: 2020-03-05
Attending: INTERNAL MEDICINE
Payer: COMMERCIAL

## 2020-03-05 ENCOUNTER — OFFICE VISIT (OUTPATIENT)
Dept: INTERNAL MEDICINE CLINIC | Facility: CLINIC | Age: 65
End: 2020-03-05

## 2020-03-05 VITALS
HEART RATE: 82 BPM | TEMPERATURE: 98 F | SYSTOLIC BLOOD PRESSURE: 132 MMHG | WEIGHT: 181 LBS | BODY MASS INDEX: 25 KG/M2 | DIASTOLIC BLOOD PRESSURE: 64 MMHG | OXYGEN SATURATION: 99 %

## 2020-03-05 DIAGNOSIS — R68.89 FLU-LIKE SYMPTOMS: ICD-10-CM

## 2020-03-05 DIAGNOSIS — R05.8 PRODUCTIVE COUGH: ICD-10-CM

## 2020-03-05 DIAGNOSIS — Z71.84 TRAVEL ADVICE ENCOUNTER: ICD-10-CM

## 2020-03-05 DIAGNOSIS — R05.8 PRODUCTIVE COUGH: Primary | ICD-10-CM

## 2020-03-05 LAB
FLUAV + FLUBV RNA SPEC NAA+PROBE: NEGATIVE

## 2020-03-05 PROCEDURE — 99214 OFFICE O/P EST MOD 30 MIN: CPT | Performed by: INTERNAL MEDICINE

## 2020-03-05 PROCEDURE — 71046 X-RAY EXAM CHEST 2 VIEWS: CPT | Performed by: INTERNAL MEDICINE

## 2020-03-05 RX ORDER — AZITHROMYCIN 250 MG/1
TABLET, FILM COATED ORAL
Qty: 6 TABLET | Refills: 1 | Status: SHIPPED | OUTPATIENT
Start: 2020-03-05 | End: 2020-08-26 | Stop reason: ALTCHOICE

## 2020-03-05 NOTE — PROGRESS NOTES
History of Present Illness   Patient ID: Delmi Singh is a 59year old male. Chief Complaint: Cough (c/o cough in the mornings. )      Cough   This is a new problem. Episode onset: 3-4 days. The problem has been unchanged.  The problem occurs every Eyes: Conjunctivae, EOM and lids are normal. Right eye exhibits no discharge. Left eye exhibits no discharge. Neck: Neck supple. Cardiovascular: Normal rate and normal heart sounds.     Pulmonary/Chest: Effort normal and breath sounds normal. No respira The ASCVD Risk score (Durene Severin., et al., 2013) failed to calculate for the following reasons:     The valid total cholesterol range is 130 to 320 mg/dL    Medical History    Reviewed Active Problems:  Patient Active Problem List    Patellofemoral pain syn Packs/day: 0.00        Types: Cigarettes      Smokeless tobacco: Never Used    Alcohol use:  Yes      Alcohol/week: 0.0 standard drinks      Comment: 1 drink per month    Drug use: No     Reviewed Current Medications:  Current Outpatient Medications Influenza is also called the flu. It is a viral illness that affects the air passages of your lungs. It is different from the common cold. The flu can easily be passed from one to person to another.  It may be spread through the air by coughing and sneezing If you are age 72 or older, talk with your provider about getting a pneumococcal vaccine every 5 years. You should also get this vaccine if you have chronic asthma or COPD. All adults should get a flu vaccine every fall. Ask your provider about this.   When

## 2020-03-05 NOTE — TELEPHONE ENCOUNTER
3/5/2020  Spoke with patient,results of CXR and Flu swab were  Both negative  With advise of Dr. Tez Vital, patient verbalizes understanding

## 2020-04-06 ENCOUNTER — APPOINTMENT (OUTPATIENT)
Dept: PHYSICAL THERAPY | Age: 65
End: 2020-04-06
Attending: PHYSICAL MEDICINE & REHABILITATION
Payer: COMMERCIAL

## 2020-04-09 ENCOUNTER — APPOINTMENT (OUTPATIENT)
Dept: PHYSICAL THERAPY | Age: 65
End: 2020-04-09
Attending: PHYSICAL MEDICINE & REHABILITATION
Payer: COMMERCIAL

## 2020-04-13 ENCOUNTER — APPOINTMENT (OUTPATIENT)
Dept: PHYSICAL THERAPY | Age: 65
End: 2020-04-13
Attending: PHYSICAL MEDICINE & REHABILITATION
Payer: COMMERCIAL

## 2020-04-23 ENCOUNTER — APPOINTMENT (OUTPATIENT)
Dept: PHYSICAL THERAPY | Age: 65
End: 2020-04-23
Attending: PHYSICAL MEDICINE & REHABILITATION
Payer: COMMERCIAL

## 2020-04-27 ENCOUNTER — APPOINTMENT (OUTPATIENT)
Dept: PHYSICAL THERAPY | Age: 65
End: 2020-04-27
Attending: PHYSICAL MEDICINE & REHABILITATION
Payer: COMMERCIAL

## 2020-04-30 ENCOUNTER — APPOINTMENT (OUTPATIENT)
Dept: PHYSICAL THERAPY | Age: 65
End: 2020-04-30
Attending: PHYSICAL MEDICINE & REHABILITATION
Payer: COMMERCIAL

## 2020-05-04 ENCOUNTER — APPOINTMENT (OUTPATIENT)
Dept: PHYSICAL THERAPY | Age: 65
End: 2020-05-04
Attending: PHYSICAL MEDICINE & REHABILITATION
Payer: COMMERCIAL

## 2020-05-07 ENCOUNTER — APPOINTMENT (OUTPATIENT)
Dept: PHYSICAL THERAPY | Age: 65
End: 2020-05-07
Attending: PHYSICAL MEDICINE & REHABILITATION
Payer: COMMERCIAL

## 2020-05-12 ENCOUNTER — APPOINTMENT (OUTPATIENT)
Dept: PHYSICAL THERAPY | Age: 65
End: 2020-05-12
Attending: INTERNAL MEDICINE
Payer: COMMERCIAL

## 2020-05-14 ENCOUNTER — APPOINTMENT (OUTPATIENT)
Dept: PHYSICAL THERAPY | Age: 65
End: 2020-05-14
Attending: PHYSICAL MEDICINE & REHABILITATION
Payer: COMMERCIAL

## 2020-05-19 ENCOUNTER — APPOINTMENT (OUTPATIENT)
Dept: PHYSICAL THERAPY | Age: 65
End: 2020-05-19
Attending: PHYSICAL MEDICINE & REHABILITATION
Payer: COMMERCIAL

## 2020-05-21 ENCOUNTER — APPOINTMENT (OUTPATIENT)
Dept: PHYSICAL THERAPY | Age: 65
End: 2020-05-21
Attending: PHYSICAL MEDICINE & REHABILITATION
Payer: COMMERCIAL

## 2020-08-26 ENCOUNTER — OFFICE VISIT (OUTPATIENT)
Dept: INTERNAL MEDICINE CLINIC | Facility: CLINIC | Age: 65
End: 2020-08-26
Payer: MEDICARE

## 2020-08-26 VITALS
SYSTOLIC BLOOD PRESSURE: 108 MMHG | HEART RATE: 87 BPM | WEIGHT: 185 LBS | HEIGHT: 71 IN | BODY MASS INDEX: 25.9 KG/M2 | TEMPERATURE: 99 F | DIASTOLIC BLOOD PRESSURE: 64 MMHG

## 2020-08-26 DIAGNOSIS — M54.16 LUMBAR RADICULOPATHY: ICD-10-CM

## 2020-08-26 DIAGNOSIS — I10 HYPERTENSION GOAL BP (BLOOD PRESSURE) < 130/80: ICD-10-CM

## 2020-08-26 DIAGNOSIS — Z13.6 SCREENING FOR CARDIOVASCULAR CONDITION: ICD-10-CM

## 2020-08-26 DIAGNOSIS — Z23 NEED FOR VACCINATION: ICD-10-CM

## 2020-08-26 DIAGNOSIS — E78.5 DYSLIPIDEMIA ASSOCIATED WITH TYPE 2 DIABETES MELLITUS (HCC): ICD-10-CM

## 2020-08-26 DIAGNOSIS — E11.9 TYPE 2 DIABETES MELLITUS WITHOUT COMPLICATION, WITHOUT LONG-TERM CURRENT USE OF INSULIN (HCC): ICD-10-CM

## 2020-08-26 DIAGNOSIS — Z12.11 SCREENING FOR COLON CANCER: ICD-10-CM

## 2020-08-26 DIAGNOSIS — E87.1 HYPONATREMIA: ICD-10-CM

## 2020-08-26 DIAGNOSIS — E11.69 DYSLIPIDEMIA ASSOCIATED WITH TYPE 2 DIABETES MELLITUS (HCC): ICD-10-CM

## 2020-08-26 DIAGNOSIS — Z86.79 HX OF INTRACRANIAL HEMORRHAGE: ICD-10-CM

## 2020-08-26 DIAGNOSIS — Z98.890 HX OF CERVICAL SPINE SURGERY: ICD-10-CM

## 2020-08-26 DIAGNOSIS — Z00.00 ENCOUNTER FOR ANNUAL HEALTH EXAMINATION: Primary | ICD-10-CM

## 2020-08-26 DIAGNOSIS — Z12.11 COLON CANCER SCREENING: ICD-10-CM

## 2020-08-26 DIAGNOSIS — J43.8 OTHER EMPHYSEMA (HCC): ICD-10-CM

## 2020-08-26 DIAGNOSIS — Z12.5 SCREENING FOR PROSTATE CANCER: ICD-10-CM

## 2020-08-26 DIAGNOSIS — Z13.1 SCREENING FOR DIABETES MELLITUS (DM): ICD-10-CM

## 2020-08-26 PROBLEM — Z86.73 HX OF COMPLETED STROKE: Status: RESOLVED | Noted: 2018-10-23 | Resolved: 2020-08-26

## 2020-08-26 PROBLEM — I99.9 VASCULAR DISEASE: Status: RESOLVED | Noted: 2019-05-01 | Resolved: 2020-08-26

## 2020-08-26 PROBLEM — D64.9 ANEMIA: Status: RESOLVED | Noted: 2019-01-14 | Resolved: 2020-08-26

## 2020-08-26 PROBLEM — L73.9 FOLLICULITIS: Status: RESOLVED | Noted: 2018-08-07 | Resolved: 2020-08-26

## 2020-08-26 PROBLEM — R21 RASH: Status: RESOLVED | Noted: 2018-04-02 | Resolved: 2020-08-26

## 2020-08-26 PROBLEM — R07.9 ACUTE CHEST PAIN: Status: RESOLVED | Noted: 2019-08-24 | Resolved: 2020-08-26

## 2020-08-26 PROBLEM — Z87.891 FORMER SMOKER: Status: RESOLVED | Noted: 2018-02-05 | Resolved: 2020-08-26

## 2020-08-26 PROCEDURE — 96160 PT-FOCUSED HLTH RISK ASSMT: CPT | Performed by: INTERNAL MEDICINE

## 2020-08-26 PROCEDURE — 3078F DIAST BP <80 MM HG: CPT | Performed by: INTERNAL MEDICINE

## 2020-08-26 PROCEDURE — G0402 INITIAL PREVENTIVE EXAM: HCPCS | Performed by: INTERNAL MEDICINE

## 2020-08-26 PROCEDURE — 3074F SYST BP LT 130 MM HG: CPT | Performed by: INTERNAL MEDICINE

## 2020-08-26 PROCEDURE — 3008F BODY MASS INDEX DOCD: CPT | Performed by: INTERNAL MEDICINE

## 2020-08-26 PROCEDURE — 99397 PER PM REEVAL EST PAT 65+ YR: CPT | Performed by: INTERNAL MEDICINE

## 2020-08-26 RX ORDER — ATORVASTATIN CALCIUM 40 MG/1
TABLET, FILM COATED ORAL
COMMUNITY
Start: 2020-06-06 | End: 2020-08-26

## 2020-08-26 RX ORDER — ATORVASTATIN CALCIUM 40 MG/1
40 TABLET, FILM COATED ORAL NIGHTLY
Qty: 90 TABLET | Refills: 3 | Status: SHIPPED | OUTPATIENT
Start: 2020-08-26 | End: 2021-04-15

## 2020-08-26 RX ORDER — LISINOPRIL 20 MG/1
20 TABLET ORAL NIGHTLY
Qty: 90 TABLET | Refills: 3 | Status: SHIPPED | OUTPATIENT
Start: 2020-08-26 | End: 2021-04-15

## 2020-08-26 RX ORDER — LISINOPRIL 20 MG/1
TABLET ORAL
COMMUNITY
Start: 2020-06-06 | End: 2020-08-26

## 2020-08-26 NOTE — PROGRESS NOTES
HPI:   Marly Meza is a 72year old male who presents for a Medicare Initial Annual Wellness visit (Once after 12 month Medicare anniversary) . His last annual assessment has been over 1 year: Annual Physical due on 02/05/2019    1.  Diabetes- He currently smokes tobacco.  Social History    Tobacco Use      Smoking status: Current Some Day Smoker        Packs/day: 0.00        Types: Cigarettes      Smokeless tobacco: Never Used     This is a tobacco user, and I will give tobacco cessat Encounters:  08/26/20 : 185 lb (83.9 kg)  03/05/20 : 181 lb (82.1 kg)  02/28/20 : 180 lb (81.6 kg)     Last Cholesterol Labs:   Lab Results   Component Value Date    CHOLEST 96 08/24/2019    HDL 37 (L) 08/24/2019    LDL 41 08/24/2019    TRIG 92 08/24/2019 loss, tinnitus and trouble swallowing. Eyes: Negative for pain and visual disturbance. Respiratory: Negative for cough, shortness of breath and wheezing. Cardiovascular: Negative for chest pain, palpitations and leg swelling.    Gastrointestinal: Ne People get annoyed because I misunderstand what they say:  No   I misunderstand what others are saying and make inappropriate responses:  Sometimes I avoid social activities because I cannot hear well and fear I will reply improperly:  No   Family members PLAN SUMMARY:   Diagnoses and all orders for this visit:    Encounter for annual health examination  -     COMP METABOLIC PANEL (14); Future  -     CBC WITH DIFFERENTIAL WITH PLATELET;  Future  -     TSH W REFLEX TO FREE T4; Future  -     COMPLETE PFT HCl 1000 MG Oral Tab; Take 1 tablet (1,000 mg total) by mouth 2 (two) times daily with meals. FOR DIABETES. -     atorvastatin 40 MG Oral Tab; Take 1 tablet (40 mg total) by mouth nightly. FOR CHOLESTEROL.  -     OPHTHALMOLOGY - INTERNAL  Plan  Chronic. Electrocardiogram date08/24/2019    Colorectal Cancer Screening      Colonoscopy Screen every 10 years Colonoscopy due on 05/31/2005 Update Health Maintenance if applicable    Flex Sigmoidoscopy Screen every 10 years No results found for this or any previo Value   02/03/2020 4.4    No flowsheet data found. Creatinine  Annually Creatinine (mg/dL)   Date Value   02/03/2020 1.10    No flowsheet data found. Drug Serum Conc  Annually No results found for: DIGOXIN, DIG, VALP No flowsheet data found.        Anugs Son

## 2020-08-26 NOTE — PATIENT INSTRUCTIONS
Johnny Rosenberg's SCREENING SCHEDULE   Tests on this list are recommended by your physician but may not be covered, or covered at this frequency, by your insurer. Please check with your insurance carrier before scheduling to verify coverage.     López Burgess every 10 years- more often if abnormal Colonoscopy due on 05/31/2005 Update [facility] if applicable    Flex Sigmoidoscopy Screen  Covered every 5 years No results found for this or any previous visit. No flowsheet data found.      Fecal Occult Bloo This may be covered with your prescription benefits, but Medicare does not cover unless Medically needed    Zoster (Not covered by Medicare Part B) No orders found for this or any previous visit.  This may be covered with your pharmacy  prescription benefit

## 2020-08-27 ENCOUNTER — LAB ENCOUNTER (OUTPATIENT)
Dept: LAB | Age: 65
End: 2020-08-27
Attending: INTERNAL MEDICINE
Payer: MEDICARE

## 2020-08-27 DIAGNOSIS — E11.9 TYPE 2 DIABETES MELLITUS WITHOUT COMPLICATION, WITHOUT LONG-TERM CURRENT USE OF INSULIN (HCC): ICD-10-CM

## 2020-08-27 DIAGNOSIS — Z00.00 ENCOUNTER FOR ANNUAL HEALTH EXAMINATION: ICD-10-CM

## 2020-08-27 DIAGNOSIS — Z23 NEED FOR VACCINATION: ICD-10-CM

## 2020-08-27 DIAGNOSIS — Z13.6 SCREENING FOR CARDIOVASCULAR CONDITION: ICD-10-CM

## 2020-08-27 DIAGNOSIS — I10 HYPERTENSION GOAL BP (BLOOD PRESSURE) < 130/80: ICD-10-CM

## 2020-08-27 DIAGNOSIS — E11.69 DYSLIPIDEMIA ASSOCIATED WITH TYPE 2 DIABETES MELLITUS (HCC): ICD-10-CM

## 2020-08-27 DIAGNOSIS — Z12.5 SCREENING FOR PROSTATE CANCER: ICD-10-CM

## 2020-08-27 DIAGNOSIS — Z13.1 SCREENING FOR DIABETES MELLITUS (DM): ICD-10-CM

## 2020-08-27 DIAGNOSIS — E78.5 DYSLIPIDEMIA ASSOCIATED WITH TYPE 2 DIABETES MELLITUS (HCC): ICD-10-CM

## 2020-08-27 LAB
ALBUMIN SERPL-MCNC: 4.3 G/DL (ref 3.4–5)
ALBUMIN/GLOB SERPL: 1.1 {RATIO} (ref 1–2)
ALP LIVER SERPL-CCNC: 76 U/L (ref 45–117)
ALT SERPL-CCNC: 72 U/L (ref 16–61)
ANION GAP SERPL CALC-SCNC: 8 MMOL/L (ref 0–18)
AST SERPL-CCNC: 36 U/L (ref 15–37)
BASOPHILS # BLD AUTO: 0.06 X10(3) UL (ref 0–0.2)
BASOPHILS NFR BLD AUTO: 0.7 %
BILIRUB SERPL-MCNC: 1.3 MG/DL (ref 0.1–2)
BUN BLD-MCNC: 15 MG/DL (ref 7–18)
BUN/CREAT SERPL: 11.6 (ref 10–20)
CALCIUM BLD-MCNC: 9.6 MG/DL (ref 8.5–10.1)
CHLORIDE SERPL-SCNC: 95 MMOL/L (ref 98–112)
CHOLEST SMN-MCNC: 117 MG/DL (ref ?–200)
CO2 SERPL-SCNC: 25 MMOL/L (ref 21–32)
COMPLEXED PSA SERPL-MCNC: 0.95 NG/ML (ref ?–4)
CREAT BLD-MCNC: 1.29 MG/DL (ref 0.7–1.3)
CREAT UR-SCNC: 269 MG/DL
DEPRECATED RDW RBC AUTO: 37.7 FL (ref 35.1–46.3)
EOSINOPHIL # BLD AUTO: 0.72 X10(3) UL (ref 0–0.7)
EOSINOPHIL NFR BLD AUTO: 8.5 %
ERYTHROCYTE [DISTWIDTH] IN BLOOD BY AUTOMATED COUNT: 12.5 % (ref 11–15)
EST. AVERAGE GLUCOSE BLD GHB EST-MCNC: 174 MG/DL (ref 68–126)
GLOBULIN PLAS-MCNC: 3.9 G/DL (ref 2.8–4.4)
GLUCOSE BLD-MCNC: 135 MG/DL (ref 70–99)
HBA1C MFR BLD HPLC: 7.7 % (ref ?–5.7)
HCT VFR BLD AUTO: 35.2 % (ref 39–53)
HDLC SERPL-MCNC: 42 MG/DL (ref 40–59)
HGB BLD-MCNC: 12.2 G/DL (ref 13–17.5)
IMM GRANULOCYTES # BLD AUTO: 0.01 X10(3) UL (ref 0–1)
IMM GRANULOCYTES NFR BLD: 0.1 %
LDLC SERPL CALC-MCNC: 62 MG/DL (ref ?–100)
LYMPHOCYTES # BLD AUTO: 2.61 X10(3) UL (ref 1–4)
LYMPHOCYTES NFR BLD AUTO: 30.7 %
M PROTEIN MFR SERPL ELPH: 8.2 G/DL (ref 6.4–8.2)
MCH RBC QN AUTO: 29.2 PG (ref 26–34)
MCHC RBC AUTO-ENTMCNC: 34.7 G/DL (ref 31–37)
MCV RBC AUTO: 84.2 FL (ref 80–100)
MICROALBUMIN UR-MCNC: 3.93 MG/DL
MICROALBUMIN/CREAT 24H UR-RTO: 14.6 UG/MG (ref ?–30)
MONOCYTES # BLD AUTO: 0.66 X10(3) UL (ref 0.1–1)
MONOCYTES NFR BLD AUTO: 7.8 %
NEUTROPHILS # BLD AUTO: 4.45 X10 (3) UL (ref 1.5–7.7)
NEUTROPHILS # BLD AUTO: 4.45 X10(3) UL (ref 1.5–7.7)
NEUTROPHILS NFR BLD AUTO: 52.2 %
NONHDLC SERPL-MCNC: 75 MG/DL (ref ?–130)
OSMOLALITY SERPL CALC.SUM OF ELEC: 269 MOSM/KG (ref 275–295)
PATIENT FASTING Y/N/NP: YES
PATIENT FASTING Y/N/NP: YES
PLATELET # BLD AUTO: 306 10(3)UL (ref 150–450)
POTASSIUM SERPL-SCNC: 4.4 MMOL/L (ref 3.5–5.1)
RBC # BLD AUTO: 4.18 X10(6)UL (ref 3.8–5.8)
SODIUM SERPL-SCNC: 128 MMOL/L (ref 136–145)
T4 FREE SERPL-MCNC: 1.1 NG/DL (ref 0.8–1.7)
TRIGL SERPL-MCNC: 67 MG/DL (ref 30–149)
TSI SER-ACNC: 5 MIU/ML (ref 0.36–3.74)
VLDLC SERPL CALC-MCNC: 13 MG/DL (ref 0–30)
WBC # BLD AUTO: 8.5 X10(3) UL (ref 4–11)

## 2020-08-27 PROCEDURE — 36415 COLL VENOUS BLD VENIPUNCTURE: CPT

## 2020-08-27 PROCEDURE — 84439 ASSAY OF FREE THYROXINE: CPT

## 2020-08-27 PROCEDURE — 82043 UR ALBUMIN QUANTITATIVE: CPT

## 2020-08-27 PROCEDURE — 85025 COMPLETE CBC W/AUTO DIFF WBC: CPT

## 2020-08-27 PROCEDURE — 84443 ASSAY THYROID STIM HORMONE: CPT

## 2020-08-27 PROCEDURE — 80061 LIPID PANEL: CPT

## 2020-08-27 PROCEDURE — 82570 ASSAY OF URINE CREATININE: CPT

## 2020-08-27 PROCEDURE — 83036 HEMOGLOBIN GLYCOSYLATED A1C: CPT

## 2020-08-27 PROCEDURE — 80053 COMPREHEN METABOLIC PANEL: CPT

## 2020-10-20 ENCOUNTER — TELEPHONE (OUTPATIENT)
Dept: GASTROENTEROLOGY | Facility: CLINIC | Age: 65
End: 2020-10-20

## 2020-10-20 ENCOUNTER — OFFICE VISIT (OUTPATIENT)
Dept: GASTROENTEROLOGY | Facility: CLINIC | Age: 65
End: 2020-10-20
Payer: MEDICARE

## 2020-10-20 VITALS
HEIGHT: 71 IN | HEART RATE: 90 BPM | WEIGHT: 195 LBS | SYSTOLIC BLOOD PRESSURE: 155 MMHG | BODY MASS INDEX: 27.3 KG/M2 | DIASTOLIC BLOOD PRESSURE: 70 MMHG

## 2020-10-20 DIAGNOSIS — Z80.0 FAMILY HISTORY OF COLON CANCER: ICD-10-CM

## 2020-10-20 DIAGNOSIS — E11.9 TYPE 2 DIABETES MELLITUS WITHOUT COMPLICATION, WITHOUT LONG-TERM CURRENT USE OF INSULIN (HCC): ICD-10-CM

## 2020-10-20 DIAGNOSIS — Z12.11 SCREEN FOR COLON CANCER: Primary | ICD-10-CM

## 2020-10-20 DIAGNOSIS — Z80.0 FAMILY HISTORY OF COLON CANCER IN FATHER: ICD-10-CM

## 2020-10-20 DIAGNOSIS — Z12.11 SCREENING FOR COLORECTAL CANCER: Primary | ICD-10-CM

## 2020-10-20 DIAGNOSIS — Z12.12 SCREENING FOR COLORECTAL CANCER: Primary | ICD-10-CM

## 2020-10-20 PROCEDURE — 3077F SYST BP >= 140 MM HG: CPT | Performed by: INTERNAL MEDICINE

## 2020-10-20 PROCEDURE — 3008F BODY MASS INDEX DOCD: CPT | Performed by: INTERNAL MEDICINE

## 2020-10-20 PROCEDURE — 99203 OFFICE O/P NEW LOW 30 MIN: CPT | Performed by: INTERNAL MEDICINE

## 2020-10-20 PROCEDURE — 3078F DIAST BP <80 MM HG: CPT | Performed by: INTERNAL MEDICINE

## 2020-10-20 RX ORDER — POLYETHYLENE GLYCOL 3350, SODIUM CHLORIDE, SODIUM BICARBONATE, POTASSIUM CHLORIDE 420; 11.2; 5.72; 1.48 G/4L; G/4L; G/4L; G/4L
POWDER, FOR SOLUTION ORAL
Qty: 1 BOTTLE | Refills: 0 | Status: SHIPPED | OUTPATIENT
Start: 2020-10-20 | End: 2021-10-19

## 2020-10-20 NOTE — TELEPHONE ENCOUNTER
Scheduled for:  Colonoscopy [id number]  Provider Name:  Dr. Brandon Camarillo  Date:  11/4/2020  Location:  [facility]  Sedation:  MAC  Time:  2:30 pm, (pt is aware that [facility] will call the day before to confirm arrival time)  Prep:  Trilyte  Meds/Allergies Reconciled?:  Physi

## 2020-10-20 NOTE — PATIENT INSTRUCTIONS
1. Schedule colonoscopy with monitored anesthesia care (MAC) at the [facility] or the hospital    2.  bowel prep from pharmacy ([email or web address] ).  As we discussed it is important to take the bowel preparation in two parts taking 2L of the liquid the night b

## 2020-10-20 NOTE — H&P
[facility], [facility] - Gastroenterology                                                                                                  Clinic History and Physical tablet (1,000 mg total) by mouth 2 (two) times daily with meals. FOR DIABETES. 180 tablet 3   • lisinopril 20 MG Oral Tab Take 1 tablet (20 mg total) by mouth nightly. FOR BLOOD PRESSURE.  90 tablet 3   • atorvastatin 40 MG Oral Tab Take 1 tablet (40 mg tot all potentially leading to prolonged hospitalization or surgical intervention. I also mentioned the possibility of missed lesion. All questions were answered to the patient’s satisfaction.  The patient elected to proceed with colonoscopy with intervention [

## 2020-11-01 ENCOUNTER — LAB REQUISITION (OUTPATIENT)
Dept: LAB | Facility: HOSPITAL | Age: 65
End: 2020-11-01
Payer: MEDICARE

## 2020-11-01 DIAGNOSIS — Z01.818 ENCOUNTER FOR OTHER PREPROCEDURAL EXAMINATION: ICD-10-CM

## 2020-11-04 ENCOUNTER — SURGERY CENTER DOCUMENTATION (OUTPATIENT)
Dept: SURGERY | Age: 65
End: 2020-11-04

## 2020-11-04 ENCOUNTER — LAB REQUISITION (OUTPATIENT)
Dept: LAB | Facility: HOSPITAL | Age: 65
End: 2020-11-04
Payer: MEDICARE

## 2020-11-04 DIAGNOSIS — Z12.11 ENCOUNTER FOR SCREENING FOR MALIGNANT NEOPLASM OF COLON: ICD-10-CM

## 2020-11-04 DIAGNOSIS — Z80.0 FAMILY HISTORY OF COLON CANCER: ICD-10-CM

## 2020-11-04 PROCEDURE — 45385 COLONOSCOPY W/LESION REMOVAL: CPT | Performed by: INTERNAL MEDICINE

## 2020-11-04 PROCEDURE — 88305 TISSUE EXAM BY PATHOLOGIST: CPT | Performed by: INTERNAL MEDICINE

## 2020-11-04 PROCEDURE — 45380 COLONOSCOPY AND BIOPSY: CPT | Performed by: INTERNAL MEDICINE

## 2020-11-04 NOTE — PROCEDURES
Colonoscopy Report    Sayra Clare     1955 Age 72year old   PCP Mercedes Langford MD Endoscopist Ryan Kam MD     Date of procedure: 20    Procedure: Colonoscopy w/ biopsy and snare polypectomy    Pre-operative diagnosis: colore tolerated the procedure well. There were no immediate postoperative complications. The patient’s vital signs were monitored throughout the procedure and remained stable.     Estimated blood loss: insignificant    Specimens collected:  Colon and rectal polyp

## 2020-11-06 ENCOUNTER — TELEPHONE (OUTPATIENT)
Dept: GASTROENTEROLOGY | Facility: CLINIC | Age: 65
End: 2020-11-06

## 2020-11-06 NOTE — TELEPHONE ENCOUNTER
Entered into Epic:Recall colon in 5 years per Dr. Kyaw Manzo. Last Colon done 11/4/2020, next due 11/4/2025. HM updated.

## 2020-11-06 NOTE — TELEPHONE ENCOUNTER
GI staff: please place recall for colonoscopy in 5 years     Then close encounter    Thanks    Scarlet Najera MD  [address] - Gastroenterology  11/6/2020  3:57 PM

## 2020-11-13 ENCOUNTER — TELEPHONE (OUTPATIENT)
Dept: CASE MANAGEMENT | Age: 65
End: 2020-11-13

## 2020-11-21 ENCOUNTER — IMMUNIZATION (OUTPATIENT)
Dept: INTERNAL MEDICINE CLINIC | Facility: CLINIC | Age: 65
End: 2020-11-21
Payer: MEDICARE

## 2020-11-21 DIAGNOSIS — Z23 NEED FOR VACCINATION: ICD-10-CM

## 2020-11-21 PROCEDURE — 90662 IIV NO PRSV INCREASED AG IM: CPT | Performed by: INTERNAL MEDICINE

## 2020-11-21 PROCEDURE — G0008 ADMIN INFLUENZA VIRUS VAC: HCPCS | Performed by: INTERNAL MEDICINE

## 2021-02-01 DIAGNOSIS — Z23 NEED FOR VACCINATION: ICD-10-CM

## 2021-03-11 ENCOUNTER — IMMUNIZATION (OUTPATIENT)
Dept: LAB | Facility: HOSPITAL | Age: 66
End: 2021-03-11
Attending: HOSPITALIST
Payer: MEDICARE

## 2021-03-11 DIAGNOSIS — Z23 NEED FOR VACCINATION: Primary | ICD-10-CM

## 2021-03-11 PROCEDURE — 0011A SARSCOV2 VAC 100MCG/0.5ML IM: CPT

## 2021-03-19 ENCOUNTER — TELEPHONE (OUTPATIENT)
Dept: INTERNAL MEDICINE CLINIC | Facility: CLINIC | Age: 66
End: 2021-03-19

## 2021-04-08 ENCOUNTER — IMMUNIZATION (OUTPATIENT)
Dept: LAB | Facility: HOSPITAL | Age: 66
End: 2021-04-08
Attending: EMERGENCY MEDICINE
Payer: MEDICARE

## 2021-04-08 DIAGNOSIS — Z23 NEED FOR VACCINATION: Primary | ICD-10-CM

## 2021-04-08 PROCEDURE — 0012A SARSCOV2 VAC 100MCG/0.5ML IM: CPT

## 2021-04-15 ENCOUNTER — OFFICE VISIT (OUTPATIENT)
Dept: INTERNAL MEDICINE CLINIC | Facility: CLINIC | Age: 66
End: 2021-04-15
Payer: MEDICARE

## 2021-04-15 VITALS
SYSTOLIC BLOOD PRESSURE: 119 MMHG | BODY MASS INDEX: 26.65 KG/M2 | HEART RATE: 88 BPM | TEMPERATURE: 97 F | WEIGHT: 190.38 LBS | DIASTOLIC BLOOD PRESSURE: 70 MMHG | HEIGHT: 71 IN

## 2021-04-15 DIAGNOSIS — M17.11 PRIMARY OSTEOARTHRITIS OF RIGHT KNEE: ICD-10-CM

## 2021-04-15 DIAGNOSIS — M47.816 LUMBAR SPONDYLOSIS: ICD-10-CM

## 2021-04-15 DIAGNOSIS — R20.2 INTERMITTENT TINGLING SENSATION OF RIGHT HAND AND FOOT: ICD-10-CM

## 2021-04-15 DIAGNOSIS — R29.898 RIGHT LEG WEAKNESS: ICD-10-CM

## 2021-04-15 DIAGNOSIS — M21.861: ICD-10-CM

## 2021-04-15 DIAGNOSIS — M22.2X9 PATELLOFEMORAL PAIN SYNDROME, UNSPECIFIED LATERALITY: ICD-10-CM

## 2021-04-15 DIAGNOSIS — M25.512 CHRONIC LEFT SHOULDER PAIN: ICD-10-CM

## 2021-04-15 DIAGNOSIS — Z23 NEED FOR ZOSTER VACCINATION: ICD-10-CM

## 2021-04-15 DIAGNOSIS — I10 HYPERTENSION GOAL BP (BLOOD PRESSURE) < 130/80: ICD-10-CM

## 2021-04-15 DIAGNOSIS — Z13.6 SCREENING FOR CARDIOVASCULAR CONDITION: ICD-10-CM

## 2021-04-15 DIAGNOSIS — Z00.00 ENCOUNTER FOR ANNUAL HEALTH EXAMINATION: Primary | ICD-10-CM

## 2021-04-15 DIAGNOSIS — M54.41 CHRONIC RIGHT-SIDED LOW BACK PAIN WITH RIGHT-SIDED SCIATICA: ICD-10-CM

## 2021-04-15 DIAGNOSIS — Z13.1 SCREENING FOR DIABETES MELLITUS (DM): ICD-10-CM

## 2021-04-15 DIAGNOSIS — M53.3 SI (SACROILIAC) JOINT DYSFUNCTION: ICD-10-CM

## 2021-04-15 DIAGNOSIS — Z98.890 HX OF CERVICAL SPINE SURGERY: ICD-10-CM

## 2021-04-15 DIAGNOSIS — Z86.79 HX OF INTRACRANIAL HEMORRHAGE: ICD-10-CM

## 2021-04-15 DIAGNOSIS — M51.37 DDD (DEGENERATIVE DISC DISEASE), LUMBOSACRAL: ICD-10-CM

## 2021-04-15 DIAGNOSIS — E11.9 TYPE 2 DIABETES MELLITUS WITHOUT COMPLICATION, WITHOUT LONG-TERM CURRENT USE OF INSULIN (HCC): ICD-10-CM

## 2021-04-15 DIAGNOSIS — M25.551 BILATERAL HIP PAIN: ICD-10-CM

## 2021-04-15 DIAGNOSIS — M48.061 FORAMINAL STENOSIS OF LUMBAR REGION: ICD-10-CM

## 2021-04-15 DIAGNOSIS — G89.29 CHRONIC LEFT SHOULDER PAIN: ICD-10-CM

## 2021-04-15 DIAGNOSIS — M25.552 BILATERAL HIP PAIN: ICD-10-CM

## 2021-04-15 DIAGNOSIS — E78.5 DYSLIPIDEMIA ASSOCIATED WITH TYPE 2 DIABETES MELLITUS (HCC): ICD-10-CM

## 2021-04-15 DIAGNOSIS — G89.29 CHRONIC RIGHT-SIDED LOW BACK PAIN WITH RIGHT-SIDED SCIATICA: ICD-10-CM

## 2021-04-15 DIAGNOSIS — Z12.5 SCREENING FOR MALIGNANT NEOPLASM OF PROSTATE: ICD-10-CM

## 2021-04-15 DIAGNOSIS — M47.816 FACET SYNDROME, LUMBAR: ICD-10-CM

## 2021-04-15 DIAGNOSIS — E11.69 DYSLIPIDEMIA ASSOCIATED WITH TYPE 2 DIABETES MELLITUS (HCC): ICD-10-CM

## 2021-04-15 DIAGNOSIS — Z11.59 NEED FOR HEPATITIS C SCREENING TEST: ICD-10-CM

## 2021-04-15 DIAGNOSIS — Z23 NEED FOR VACCINATION: ICD-10-CM

## 2021-04-15 PROBLEM — J43.8 OTHER EMPHYSEMA (HCC): Status: RESOLVED | Noted: 2018-12-18 | Resolved: 2021-04-15

## 2021-04-15 PROCEDURE — G0438 PPPS, INITIAL VISIT: HCPCS | Performed by: INTERNAL MEDICINE

## 2021-04-15 PROCEDURE — 3074F SYST BP LT 130 MM HG: CPT | Performed by: INTERNAL MEDICINE

## 2021-04-15 PROCEDURE — 96160 PT-FOCUSED HLTH RISK ASSMT: CPT | Performed by: INTERNAL MEDICINE

## 2021-04-15 PROCEDURE — 99397 PER PM REEVAL EST PAT 65+ YR: CPT | Performed by: INTERNAL MEDICINE

## 2021-04-15 PROCEDURE — 3078F DIAST BP <80 MM HG: CPT | Performed by: INTERNAL MEDICINE

## 2021-04-15 PROCEDURE — 3008F BODY MASS INDEX DOCD: CPT | Performed by: INTERNAL MEDICINE

## 2021-04-15 RX ORDER — ASPIRIN 81 MG/1
81 TABLET ORAL DAILY
Qty: 360 TABLET | Refills: 1 | Status: SHIPPED | OUTPATIENT
Start: 2021-04-15 | End: 2021-10-19

## 2021-04-15 RX ORDER — LISINOPRIL 20 MG/1
20 TABLET ORAL NIGHTLY
Qty: 90 TABLET | Refills: 3 | Status: SHIPPED | OUTPATIENT
Start: 2021-04-15 | End: 2021-07-16

## 2021-04-15 RX ORDER — ATORVASTATIN CALCIUM 40 MG/1
40 TABLET, FILM COATED ORAL NIGHTLY
Qty: 90 TABLET | Refills: 3 | Status: SHIPPED | OUTPATIENT
Start: 2021-04-15 | End: 2021-07-16

## 2021-04-15 NOTE — PATIENT INSTRUCTIONS
Johnny Rosenberg's SCREENING SCHEDULE   Tests on this list are recommended by your physician but may not be covered, or covered at this frequency, by your insurer. Please check with your insurance carrier before scheduling to verify coverage.     Waldemar Addison every 10 years- more often if abnormal Colonoscopy due on 11/04/2025 Update [facility] if applicable    Flex Sigmoidoscopy Screen  Covered every 5 years No results found for this or any previous visit. No flowsheet data found.      Fecal Occult Bloo found for this or any previous visit. This may be covered with your prescription benefits, but Medicare does not cover unless Medically needed    Zoster (Not covered by Medicare Part B) No orders found for this or any previous visit.  This may be covered wi

## 2021-04-15 NOTE — PROGRESS NOTES
HPI:   Marie Holley is a 72year old male who presents for a MA (Medicare Advantage) Supervisit (Once per calendar year). Annual Physical due on 08/26/2021   Doing well today. No complaints.      Fall/Risk Assessment   He has been screened for (MPAS) (Registered Nurse)    Patient Active Problem List:     Type 2 diabetes mellitus without complication, without long-term current use of insulin (HCC)     Hx of intracranial hemorrhage     Hx of cervical spine surgery     Hypertension goal BP (blood p Take 1 tablet (81 mg total) by mouth daily.        MEDICAL INFORMATION:   He  has a past medical history of Diabetes ([facility] [address] Intracranial hemorrhage following injury ([facility] [address] (12/4/2015), Other and unspecified hyperlipidemia, and Serrated adenoma of colon (11/0 movements intact. Conjunctiva/sclera: Conjunctivae normal.   Cardiovascular:      Rate and Rhythm: Normal rate. Heart sounds: Normal heart sounds.    Pulmonary:      Effort: Pulmonary effort is normal.   Abdominal:      Palpations: Abdomen is soft A1c.    Screening for malignant neoplasm of prostate  -     PSA SCREEN; Future  Plan  As above    Screening for cardiovascular condition  -     LIPID PANEL;  Future  Plan  As above    Screening for diabetes mellitus (DM)  -     HEMOGLOBIN A1C; Future  Plan exercise. Return in about 3 months (around 7/15/2021) for for diabetes.  Malena Howard MD, 4/15/2021     General Health     In the past six months, have you lost more than 10 pounds without trying?: 2 - No  Has your appetite been poor?: No  How hammonds 65th birthday    Pneumococcal 23 (Pneumovax)  Covered Once after 65 No vaccine history found Please get once after your 65th birthday    Hepatitis B for Moderate/High Risk No vaccine history found Medium/high risk factors:   End-stage renal disease   Hemop

## 2021-04-19 PROBLEM — M54.16 LUMBAR RADICULOPATHY: Status: RESOLVED | Noted: 2018-10-23 | Resolved: 2021-04-19

## 2021-04-19 PROBLEM — M54.41 ACUTE RIGHT-SIDED LOW BACK PAIN WITH RIGHT-SIDED SCIATICA: Status: RESOLVED | Noted: 2018-10-23 | Resolved: 2021-04-19

## 2021-04-19 PROBLEM — M53.3 SACROILIAC JOINT DYSFUNCTION OF RIGHT SIDE: Status: RESOLVED | Noted: 2019-03-20 | Resolved: 2021-04-19

## 2021-04-19 PROBLEM — M25.561 CHRONIC PAIN OF RIGHT KNEE: Status: RESOLVED | Noted: 2019-10-04 | Resolved: 2021-04-19

## 2021-04-19 PROBLEM — R29.898 WEAKNESS OF RIGHT FOOT: Status: RESOLVED | Noted: 2018-10-23 | Resolved: 2021-04-19

## 2021-04-19 PROBLEM — G89.29 CHRONIC PAIN OF RIGHT KNEE: Status: RESOLVED | Noted: 2019-10-04 | Resolved: 2021-04-19

## 2021-04-19 PROBLEM — E87.1 HYPONATREMIA: Status: RESOLVED | Noted: 2019-01-14 | Resolved: 2021-04-19

## 2021-04-19 PROBLEM — M54.59 MECHANICAL LOW BACK PAIN: Status: RESOLVED | Noted: 2019-07-24 | Resolved: 2021-04-19

## 2021-04-26 ENCOUNTER — LAB ENCOUNTER (OUTPATIENT)
Dept: LAB | Age: 66
End: 2021-04-26
Attending: INTERNAL MEDICINE
Payer: MEDICARE

## 2021-04-26 DIAGNOSIS — Z12.5 SCREENING FOR MALIGNANT NEOPLASM OF PROSTATE: ICD-10-CM

## 2021-04-26 DIAGNOSIS — Z13.6 SCREENING FOR CARDIOVASCULAR CONDITION: ICD-10-CM

## 2021-04-26 DIAGNOSIS — Z13.1 SCREENING FOR DIABETES MELLITUS (DM): ICD-10-CM

## 2021-04-26 DIAGNOSIS — E11.9 TYPE 2 DIABETES MELLITUS WITHOUT COMPLICATION, WITHOUT LONG-TERM CURRENT USE OF INSULIN (HCC): ICD-10-CM

## 2021-04-26 DIAGNOSIS — Z11.59 NEED FOR HEPATITIS C SCREENING TEST: ICD-10-CM

## 2021-04-26 DIAGNOSIS — Z00.00 ENCOUNTER FOR ANNUAL HEALTH EXAMINATION: ICD-10-CM

## 2021-04-26 PROCEDURE — 80053 COMPREHEN METABOLIC PANEL: CPT

## 2021-04-26 PROCEDURE — 36415 COLL VENOUS BLD VENIPUNCTURE: CPT

## 2021-04-26 PROCEDURE — 3061F NEG MICROALBUMINURIA REV: CPT | Performed by: INTERNAL MEDICINE

## 2021-04-26 PROCEDURE — 84439 ASSAY OF FREE THYROXINE: CPT

## 2021-04-26 PROCEDURE — 86803 HEPATITIS C AB TEST: CPT

## 2021-04-26 PROCEDURE — 83036 HEMOGLOBIN GLYCOSYLATED A1C: CPT

## 2021-04-26 PROCEDURE — 82570 ASSAY OF URINE CREATININE: CPT

## 2021-04-26 PROCEDURE — 80061 LIPID PANEL: CPT

## 2021-04-26 PROCEDURE — 82043 UR ALBUMIN QUANTITATIVE: CPT

## 2021-04-26 PROCEDURE — 84443 ASSAY THYROID STIM HORMONE: CPT

## 2021-04-26 PROCEDURE — 85025 COMPLETE CBC W/AUTO DIFF WBC: CPT

## 2021-07-16 ENCOUNTER — OFFICE VISIT (OUTPATIENT)
Dept: INTERNAL MEDICINE CLINIC | Facility: CLINIC | Age: 66
End: 2021-07-16
Payer: MEDICARE

## 2021-07-16 VITALS
TEMPERATURE: 97 F | SYSTOLIC BLOOD PRESSURE: 122 MMHG | HEART RATE: 80 BPM | BODY MASS INDEX: 26.69 KG/M2 | DIASTOLIC BLOOD PRESSURE: 80 MMHG | WEIGHT: 190.63 LBS | HEIGHT: 71 IN

## 2021-07-16 DIAGNOSIS — E11.69 DYSLIPIDEMIA ASSOCIATED WITH TYPE 2 DIABETES MELLITUS (HCC): ICD-10-CM

## 2021-07-16 DIAGNOSIS — E11.59 HYPERTENSION ASSOCIATED WITH TYPE 2 DIABETES MELLITUS (HCC): Primary | ICD-10-CM

## 2021-07-16 DIAGNOSIS — I15.2 HYPERTENSION ASSOCIATED WITH TYPE 2 DIABETES MELLITUS (HCC): Primary | ICD-10-CM

## 2021-07-16 DIAGNOSIS — Z23 NEED FOR PNEUMOCOCCAL VACCINATION: ICD-10-CM

## 2021-07-16 DIAGNOSIS — R79.89 ABNORMAL TSH: ICD-10-CM

## 2021-07-16 DIAGNOSIS — E11.9 TYPE 2 DIABETES MELLITUS WITHOUT COMPLICATION, WITHOUT LONG-TERM CURRENT USE OF INSULIN (HCC): ICD-10-CM

## 2021-07-16 DIAGNOSIS — Z23 NEED FOR SHINGLES VACCINE: ICD-10-CM

## 2021-07-16 DIAGNOSIS — E78.5 DYSLIPIDEMIA ASSOCIATED WITH TYPE 2 DIABETES MELLITUS (HCC): ICD-10-CM

## 2021-07-16 PROBLEM — I10 HYPERTENSION GOAL BP (BLOOD PRESSURE) < 130/80: Status: RESOLVED | Noted: 2018-04-02 | Resolved: 2021-07-16

## 2021-07-16 LAB
CARTRIDGE EXPIRATION DATE: ABNORMAL DATE
CARTRIDGE LOT#: ABNORMAL NUMERIC
HEMOGLOBIN A1C: 7.2 % (ref 4.3–5.6)

## 2021-07-16 PROCEDURE — 83036 HEMOGLOBIN GLYCOSYLATED A1C: CPT | Performed by: INTERNAL MEDICINE

## 2021-07-16 PROCEDURE — 3074F SYST BP LT 130 MM HG: CPT | Performed by: INTERNAL MEDICINE

## 2021-07-16 PROCEDURE — 3051F HG A1C>EQUAL 7.0%<8.0%: CPT | Performed by: INTERNAL MEDICINE

## 2021-07-16 PROCEDURE — 99214 OFFICE O/P EST MOD 30 MIN: CPT | Performed by: INTERNAL MEDICINE

## 2021-07-16 PROCEDURE — 90732 PPSV23 VACC 2 YRS+ SUBQ/IM: CPT | Performed by: INTERNAL MEDICINE

## 2021-07-16 PROCEDURE — 3008F BODY MASS INDEX DOCD: CPT | Performed by: INTERNAL MEDICINE

## 2021-07-16 PROCEDURE — 3079F DIAST BP 80-89 MM HG: CPT | Performed by: INTERNAL MEDICINE

## 2021-07-16 PROCEDURE — G0009 ADMIN PNEUMOCOCCAL VACCINE: HCPCS | Performed by: INTERNAL MEDICINE

## 2021-07-16 RX ORDER — ATORVASTATIN CALCIUM 40 MG/1
40 TABLET, FILM COATED ORAL NIGHTLY
Qty: 90 TABLET | Refills: 3 | Status: SHIPPED | OUTPATIENT
Start: 2021-07-16 | End: 2021-10-19

## 2021-07-16 RX ORDER — LISINOPRIL 20 MG/1
20 TABLET ORAL NIGHTLY
Qty: 90 TABLET | Refills: 3 | Status: SHIPPED | OUTPATIENT
Start: 2021-07-16 | End: 2021-10-19

## 2021-07-16 NOTE — PATIENT INSTRUCTIONS
Jardiance Oral Tablet 10 mg   Uses  For diabetes. Instructions  This medicine may be taken with or without food. It is very important that you take the medicine at about the same time every day. It will work best if you do this.   Store at room [facility] a rare but very serious medical condition. Please speak with your doctor about symptoms you should look out for while on this medicine. Notify your doctor immediately if you develop those symptoms.   Some patients taking this medicine have experienced paul some common side effects from this medicine. Please speak with your doctor about what you should do if you experience these or other side effects.   · dizziness  · increased urinary frequency  · waking up at night to urinate  Call your doctor or get medical

## 2021-07-16 NOTE — PROGRESS NOTES
History of Present Illness   Patient ID: Emery Holland is a 77year old male. Chief Complaint: Diabetes      Hypertension  This is a chronic problem. The current episode started more than 1 year ago. The problem is controlled.  Pertinent negatives in myalgias. Neurological: Negative for dizziness, syncope, weakness, light-headedness and headaches. Psychiatric/Behavioral: Negative.            Physical Exam     07/16/21  0852   BP: 122/80   Weight: 190 lb 9.6 oz (86.5 kg)   Height: 5' 11\" (1.803 m) metFORMIN HCl; Take 1 tablet (1,000 mg total) by mouth 2 (two) times daily with meals. FOR DIABETES. Dispense: 180 tablet; Refill: 3  -     Atorvastatin Calcium; Take 1 tablet (40 mg total) by mouth nightly. FOR CHOLESTEROL. Dispense: 90 tablet;  Refill: joint dysfunction      Intermittent tingling sensation of right hand and foot      Bilateral hip pain      Hx of intracranial hemorrhage      Hx of cervical spine surgery            In 2016, spinal fusion surgery. Dr Joey Dillon.                  Type 2 diabetes m Jardiance Oral Tablet 10 mg   Uses  For diabetes. Instructions  This medicine may be taken with or without food. It is very important that you take the medicine at about the same time every day. It will work best if you do this.   Store at room [employer] rare but very serious medical condition. Please speak with your doctor about symptoms you should look out for while on this medicine. Notify your doctor immediately if you develop those symptoms.   Some patients taking this medicine have experienced serious some common side effects from this medicine. Please speak with your doctor about what you should do if you experience these or other side effects.   · dizziness  · increased urinary frequency  · waking up at night to urinate  Call your doctor or get medical

## 2021-07-16 NOTE — ASSESSMENT & PLAN NOTE
Chronic. A1c unchanged at 7.2, he is compliant with Metformin 1000 mg twice daily, we will add Jardiance 10 mg repeat A1c in 3 months.   Side effects of medications discussed, more info given in AVS.

## 2021-09-27 ENCOUNTER — PATIENT MESSAGE (OUTPATIENT)
Dept: INTERNAL MEDICINE CLINIC | Facility: CLINIC | Age: 66
End: 2021-09-27

## 2021-09-27 DIAGNOSIS — E11.9 TYPE 2 DIABETES MELLITUS WITHOUT COMPLICATION, WITHOUT LONG-TERM CURRENT USE OF INSULIN (HCC): Primary | ICD-10-CM

## 2021-09-28 NOTE — TELEPHONE ENCOUNTER
From: Chapis Toribio  To: Sandie Lakhani MD  Sent: 9/27/2021 6:39 PM CDT  Subject: Referral     I would like to visit Dr. Ravi Villalobos MD  Ophthalmology. Can I get referrals for dr Lacey Fields instead of Dr Sailaja Mojica.  Nothing personal but I have been going t

## 2021-10-06 ENCOUNTER — PATIENT MESSAGE (OUTPATIENT)
Dept: INTERNAL MEDICINE CLINIC | Facility: CLINIC | Age: 66
End: 2021-10-06

## 2021-10-07 NOTE — TELEPHONE ENCOUNTER
getachew message with recommendation sent to pt.        Future Appointments   Date Time Provider Gutierrez Geovanna   10/19/2021  8:00 AM Yassine Souza MD [facility]   12/31/2021 10:45 AM Juana Londono MD [address] [facility]

## 2021-10-07 NOTE — TELEPHONE ENCOUNTER
From: Janett Jeff  To: Mila Morrow MD  Sent: 9/27/2021 6:39 PM CDT  Subject: Referral     I would like to visit Dr. Shirley Ya MD  Ophthalmology. Can I get referrals for dr Eloina Lombardo instead of Dr Chato Clements.  Nothing personal but I have been going t

## 2021-10-19 ENCOUNTER — OFFICE VISIT (OUTPATIENT)
Dept: INTERNAL MEDICINE CLINIC | Facility: CLINIC | Age: 66
End: 2021-10-19
Payer: MEDICARE

## 2021-10-19 VITALS
SYSTOLIC BLOOD PRESSURE: 121 MMHG | DIASTOLIC BLOOD PRESSURE: 69 MMHG | WEIGHT: 182 LBS | TEMPERATURE: 98 F | HEIGHT: 71 IN | HEART RATE: 90 BPM | BODY MASS INDEX: 25.48 KG/M2

## 2021-10-19 DIAGNOSIS — Z23 NEED FOR SHINGLES VACCINE: ICD-10-CM

## 2021-10-19 DIAGNOSIS — I15.2 HYPERTENSION ASSOCIATED WITH TYPE 2 DIABETES MELLITUS (HCC): ICD-10-CM

## 2021-10-19 DIAGNOSIS — Z23 FLU VACCINE NEED: ICD-10-CM

## 2021-10-19 DIAGNOSIS — E11.9 TYPE 2 DIABETES MELLITUS WITHOUT COMPLICATION, WITHOUT LONG-TERM CURRENT USE OF INSULIN (HCC): Primary | ICD-10-CM

## 2021-10-19 DIAGNOSIS — E78.5 DYSLIPIDEMIA ASSOCIATED WITH TYPE 2 DIABETES MELLITUS (HCC): ICD-10-CM

## 2021-10-19 DIAGNOSIS — E11.69 DYSLIPIDEMIA ASSOCIATED WITH TYPE 2 DIABETES MELLITUS (HCC): ICD-10-CM

## 2021-10-19 DIAGNOSIS — E11.59 HYPERTENSION ASSOCIATED WITH TYPE 2 DIABETES MELLITUS (HCC): ICD-10-CM

## 2021-10-19 PROCEDURE — 99214 OFFICE O/P EST MOD 30 MIN: CPT | Performed by: INTERNAL MEDICINE

## 2021-10-19 PROCEDURE — 90662 IIV NO PRSV INCREASED AG IM: CPT | Performed by: INTERNAL MEDICINE

## 2021-10-19 PROCEDURE — 3008F BODY MASS INDEX DOCD: CPT | Performed by: INTERNAL MEDICINE

## 2021-10-19 PROCEDURE — G0008 ADMIN INFLUENZA VIRUS VAC: HCPCS | Performed by: INTERNAL MEDICINE

## 2021-10-19 PROCEDURE — 83036 HEMOGLOBIN GLYCOSYLATED A1C: CPT | Performed by: INTERNAL MEDICINE

## 2021-10-19 PROCEDURE — 3044F HG A1C LEVEL LT 7.0%: CPT | Performed by: INTERNAL MEDICINE

## 2021-10-19 PROCEDURE — 3074F SYST BP LT 130 MM HG: CPT | Performed by: INTERNAL MEDICINE

## 2021-10-19 PROCEDURE — 3078F DIAST BP <80 MM HG: CPT | Performed by: INTERNAL MEDICINE

## 2021-10-19 RX ORDER — ASPIRIN 81 MG/1
81 TABLET ORAL DAILY
Qty: 360 TABLET | Refills: 3 | Status: SHIPPED | OUTPATIENT
Start: 2021-10-19

## 2021-10-19 RX ORDER — ATORVASTATIN CALCIUM 40 MG/1
40 TABLET, FILM COATED ORAL NIGHTLY
Qty: 90 TABLET | Refills: 3 | Status: SHIPPED | OUTPATIENT
Start: 2021-10-19 | End: 2022-01-17

## 2021-10-19 RX ORDER — LISINOPRIL 20 MG/1
20 TABLET ORAL NIGHTLY
Qty: 90 TABLET | Refills: 3 | Status: SHIPPED | OUTPATIENT
Start: 2021-10-19 | End: 2022-01-17

## 2021-10-19 NOTE — PATIENT INSTRUCTIONS
Double check that your B vitamin that you are currently taking is a super B complex, this contains high amounts of all the B vitamins. A lot of times there is B12 complex which is not the same as super B complex.   The B12 complex is heavily focused on B12

## 2021-10-19 NOTE — PROGRESS NOTES
History of Present Illness   Patient ID: Harish Leonard is a 77year old male. Today's Date: 10/19/21  Chief Complaint: Diabetes      HPI  1.   He has hypertension associated type 2 diabetes, he is on lisinopril 20 mg, no adverse effects, blood pres Skin:     Coloration: Skin is not jaundiced. Neurological:      General: No focal deficit present. Mental Status: He is alert and oriented to person, place, and time. Mental status is at baseline.    Psychiatric:         Mood and Affect: Mood nicole diabetes mellitus (HCC)  -     Lisinopril; Take 1 tablet (20 mg total) by mouth nightly. FOR BLOOD PRESSURE. Dispense: 90 tablet; Refill: 3  Plan  Chronic, well controlled on current medications, denies adverse effects, continue with present management. Marcia.                  Type 2 diabetes mellitus without complication, without long-term current use of insulin ([facility] [address]       Reviewed Social History:  Social History    Tobacco Use      Smoking status: Former Smoker        Packs/day: 0.00        Types: Cigar

## 2021-11-12 ENCOUNTER — OFFICE VISIT (OUTPATIENT)
Dept: INTERNAL MEDICINE CLINIC | Facility: CLINIC | Age: 66
End: 2021-11-12
Payer: MEDICARE

## 2021-11-12 VITALS
HEART RATE: 97 BPM | DIASTOLIC BLOOD PRESSURE: 64 MMHG | SYSTOLIC BLOOD PRESSURE: 117 MMHG | HEIGHT: 71 IN | BODY MASS INDEX: 25.2 KG/M2 | WEIGHT: 180 LBS

## 2021-11-12 DIAGNOSIS — B35.9 RINGWORM: Primary | ICD-10-CM

## 2021-11-12 PROCEDURE — 99214 OFFICE O/P EST MOD 30 MIN: CPT | Performed by: INTERNAL MEDICINE

## 2021-11-12 PROCEDURE — 3074F SYST BP LT 130 MM HG: CPT | Performed by: INTERNAL MEDICINE

## 2021-11-12 PROCEDURE — 3008F BODY MASS INDEX DOCD: CPT | Performed by: INTERNAL MEDICINE

## 2021-11-12 PROCEDURE — 3078F DIAST BP <80 MM HG: CPT | Performed by: INTERNAL MEDICINE

## 2021-11-12 RX ORDER — CLOTRIMAZOLE AND BETAMETHASONE DIPROPIONATE 10; .64 MG/G; MG/G
1 CREAM TOPICAL 2 TIMES DAILY
Qty: 45 G | Refills: 1 | Status: SHIPPED | OUTPATIENT
Start: 2021-11-12 | End: 2021-11-26

## 2021-11-12 NOTE — PROGRESS NOTES
History of Present Illness   Patient ID: Jeanna Alonso is a 77year old male. Today's Date: 11/12/21  Chief Complaint: Rash (below L knee)      Rash  This is a new problem. The current episode started 1 to 4 weeks ago.  The problem has been gradual Medications:  Current Outpatient Medications   Medication Sig Dispense Refill   • clotrimazole-betamethasone 1-0.05 % External Cream Apply 1 Application topically 2 (two) times daily for 14 days.  45 g 1   • lisinopril 20 MG Oral Tab Take 1 tablet (20 mg to Type 2 diabetes mellitus without complication, without long-term current use of insulin (HCC)       Reviewed Social History:  Social History    Tobacco Use      Smoking status: Former Smoker        Packs/day: 0.00        Types: Cigarettes      S weak immune systems can get a fungal infection more easily. This includes people with diabetes or HIV, or who are being treated for cancer. In these cases, the fungal infection can spread and cause severe illness.  Fungal infections are also more common in 8/1/2019 © 2000-2021 [employer] [address]. All rights reserved. This information is not intended as a substitute for professional medical care. Always follow your healthcare professional's instructions.         Ringworm of the Skin     Ringworm is a f scarring. · Take medicine as prescribed. If you were prescribed a cream, apply it exactly as directed. Make sure to put the cream not just on the rash, but also on the skin 1 or 2 inches around it.  Medicine by mouth is sometimes needed, particularly for r

## 2021-11-12 NOTE — PATIENT INSTRUCTIONS
Fungal Skin Infection (Tinea)  A fungal infection occurs when too much fungus grows on or in the body. Fungus normally lives on the skin in small amounts and does not cause harm. But when too much grows on the skin, it causes an infection.  This is also k clothing. · Don’t scratch the affected area. This can delay healing and may spread the infection. It can also cause a bacterial infection. · Keep your skin clean, but don’t wash the skin too much. This can irritate your skin.   · Keep in mind that it may develop the infection after being exposed. So, you may not figure out the exact cause.   It's spread through direct contact with:  · An infected person or animal  · Infected soil, or objects such as towels, clothing, and [place]  Symptoms  At first you might brushes. · Avoid having your skin and feet wet or damp for long periods. · Wear clean, loose-fitting underwear.   Follow-up care  Follow up with your healthcare provider as advised by our staff if the rash does not improve after 10 days of treatment or if

## 2021-11-30 NOTE — PROGRESS NOTES
Chief Complaint  Diabetes,  HTN, Hyperlipidemia, CPE    Subjective            Marco Singletary presents to [facility]  Pt is here for a CPE. No issues or concerns at this time. Pt is also seen by Shirley Pickering for DM management.    Pt is due DM eye exam. Pt states he has the paper and will get appt.    Pt is due DM foot exam.     Pt will receive flu vaccine in office.    Pt would like the shingles vaccine sent to [facility]         [facility]  Past Medical History:   Diagnosis Date   • 3-vessel CAD     severe, now  s/p CABG   • GERD (gastroesophageal reflux disease)    • HLD (hyperlipidemia)    • Hypertension    • Leukocytosis     post-op   • Obesity    • CHAD (obstructive sleep apnea)     with use of CPAP   • STEMI (ST elevation myocardial infarction) (HCC)    • Type 2 diabetes mellitus (HCC)        ALLERGY  Allergies   Allergen Reactions   • Bee Venom Swelling        SURGICALHX  Past Surgical History:   Procedure Laterality Date   • CORONARY ARTERY BYPASS GRAFT N/A 8/1/2019    Procedure: ERICA STERNOTOMY CORONARY ARTERY BYPASS GRAFT TIMES 5 USING LEFT INTERNAL MAMMARY ARTERY AND LEFT GREATER SAPHENOUS VEIN GRAFT PER ENDOSCOPIC VEIN HARVESTING AND PRP;  Surgeon: Christiano Zapien MD;  Location: [facility];  Service: Cardiothoracic   • HERNIA REPAIR Right     inguinal         SOCX  Social History     Tobacco Use   • Smoking status: Never Smoker   • Smokeless tobacco: Never Used   • Tobacco comment: caffeine use - 1 cup coffee daily   Substance Use Topics   • Alcohol use: No       FAMHX  Family History   Problem Relation Age of Onset   • Dysrhythmia Father    • Hypertension Father    • Hypertension Mother    • Hypertension Maternal Grandmother    • Hypertension Maternal Grandfather         MEDSIGONLY  Current Outpatient Medications on File Prior to Visit   Medication Sig   • amiodarone (PACERONE) 200 MG tablet Take 200 mg by mouth Daily.   • amLODIPine (NORVASC) 2.5 MG tablet Take 1 tablet  Diagnosis:  DDD (degenerative disc disease), lumbar (M51.36)  Radicular leg pain (M54.10)              Next MD visit: none scheduled  Fall Risk: standard         Precautions: n/a          Medication Changes since last visit?: No    Subjective: Pt reports 6 "by mouth Daily.   • aspirin 81 MG chewable tablet Chew 81 mg Daily.   • atorvastatin (LIPITOR) 40 MG tablet Take 40 mg by mouth Daily.   • Blood Glucose Monitoring Suppl (ONE TOUCH ULTRA 2) w/Device kit See Admin Instructions.   • eszopiclone (LUNESTA) 2 MG tablet Take 2 mg by mouth As Needed for Sleep. Take immediately before bedtime    • glucose blood test strip Use as instructed   • guaiFENesin (MUCINEX) 600 MG 12 hr tablet Take 600 mg by mouth.   • Lancets misc 1 each 2 (two) times a day.   • losartan (COZAAR) 100 MG tablet Take 1 tablet by mouth Daily.   • metFORMIN (Glucophage) 500 MG tablet Take 2 tablets by mouth 2 (Two) Times a Day With Meals.   • metoprolol tartrate (LOPRESSOR) 50 MG tablet Take 1.5 tablets by mouth 2 (Two) Times a Day.   • SITagliptin (Januvia) 100 MG tablet Take 1 tablet by mouth Daily.     No current facility-administered medications on file prior to visit.       Health Maintenance Due   Topic Date Due   • ANNUAL PHYSICAL  Never done   • Pneumococcal Vaccine 0-64 (1 of 2 - PPSV23) Never done   • Hepatitis B (1 of 3 - Risk 3-dose series) Never done   • ZOSTER VACCINE (1 of 2) Never done   • HEPATITIS C SCREENING  Never done   • DIABETIC FOOT EXAM  Never done   • DIABETIC EYE EXAM  Never done   • COVID-19 Vaccine (3 - Booster for Moderna series) 09/05/2021       Objective     /94   Pulse 76   Ht 175.3 cm (69\")   Wt 119 kg (263 lb)   SpO2 97%   BMI 38.84 kg/m²       Physical Exam  Constitutional:       General: He is not in acute distress.     Appearance: Normal appearance. He is not ill-appearing.   HENT:      Head: Normocephalic and atraumatic.      Right Ear: Tympanic membrane, ear canal and external ear normal.      Left Ear: Tympanic membrane, ear canal and external ear normal.      Nose: Nose normal.      Mouth/Throat:      Pharynx: No oropharyngeal exudate or posterior oropharyngeal erythema.   Cardiovascular:      Rate and Rhythm: Normal rate and regular rhythm.      " Pulses:           Dorsalis pedis pulses are 2+ on the right side and 2+ on the left side.      Heart sounds: Normal heart sounds. No murmur heard.      Pulmonary:      Effort: Pulmonary effort is normal. No respiratory distress.      Breath sounds: Normal breath sounds.   Chest:      Chest wall: No tenderness.   Abdominal:      General: Abdomen is flat. Bowel sounds are normal. There is no distension.      Palpations: Abdomen is soft. There is no mass.      Tenderness: There is no abdominal tenderness. There is no guarding.   Musculoskeletal:         General: No swelling or tenderness. Normal range of motion.      Cervical back: Normal range of motion and neck supple.   Feet:      Right foot:      Protective Sensation: 3 sites tested. 3 sites sensed.      Skin integrity: Skin integrity normal. No ulcer or blister.      Toenail Condition: Right toenails are normal.      Left foot:      Protective Sensation: 3 sites tested. 3 sites sensed.      Skin integrity: Skin integrity normal. No ulcer or blister.      Toenail Condition: Left toenails are normal.      Comments:      Skin:     General: Skin is warm and dry.      Findings: No rash.   Neurological:      General: No focal deficit present.      Mental Status: He is alert and oriented to person, place, and time. Mental status is at baseline.      Gait: Gait normal.   Psychiatric:         Mood and Affect: Mood normal.         Behavior: Behavior normal.         Thought Content: Thought content normal.         Judgment: Judgment normal.           Result Review :                           Assessment and Plan        Diagnoses and all orders for this visit:    1. Annual physical exam (Primary)    2. Need for influenza vaccination  -     FluLaval/Fluarix/Fluzone >6 Months    3. Type 2 diabetes mellitus with hyperglycemia, without long-term current use of insulin ([facility])  Comments:  stableon glucophage 500mg and Januvia 100mg, continue, continue f/u with adin Pickering    4.  Mixed hyperlipidemia  Comments:  stable on lipitor 40mg, continue, managed by [facility]    5. Primary hypertension  Comments:  stable on cozaar 100mg and norvasc 2.5mg, continue, managed by [facility]      Preventative Counseling:  Avoid alcohol and illegal drugs  Get adequate sleep  Healthy diet and daily exercie        Follow Up     Return in about 1 year (around 11/30/2022) for Annual physical.    Patient was given instructions and counseling regarding his condition or for health maintenance advice. Please see specific information pulled into the AVS if appropriate.     Marco Singletary  reports that he has never smoked. He has never used smokeless tobacco.

## 2021-12-06 ENCOUNTER — TELEPHONE (OUTPATIENT)
Dept: INTERNAL MEDICINE CLINIC | Facility: CLINIC | Age: 66
End: 2021-12-06

## 2021-12-06 NOTE — TELEPHONE ENCOUNTER
Spoke with pt on 21 name and  verified  Cataract surgery date 22  Pre-op appt scheduled 21  Orders from Dr Johanne Acosta include CMP, CBC, and EKG

## 2021-12-17 ENCOUNTER — LAB ENCOUNTER (OUTPATIENT)
Dept: LAB | Age: 66
End: 2021-12-17
Attending: INTERNAL MEDICINE
Payer: MEDICARE

## 2021-12-17 ENCOUNTER — OFFICE VISIT (OUTPATIENT)
Dept: INTERNAL MEDICINE CLINIC | Facility: CLINIC | Age: 66
End: 2021-12-17
Payer: MEDICARE

## 2021-12-17 VITALS
WEIGHT: 180 LBS | HEIGHT: 71 IN | SYSTOLIC BLOOD PRESSURE: 115 MMHG | BODY MASS INDEX: 25.2 KG/M2 | DIASTOLIC BLOOD PRESSURE: 69 MMHG | HEART RATE: 92 BPM

## 2021-12-17 DIAGNOSIS — Z01.818 PRE-OPERATIVE EXAMINATION: Primary | ICD-10-CM

## 2021-12-17 DIAGNOSIS — H25.9 SENILE CATARACT OF LEFT EYE, UNSPECIFIED AGE-RELATED CATARACT TYPE: ICD-10-CM

## 2021-12-17 DIAGNOSIS — Z01.818 PRE-OPERATIVE EXAMINATION: ICD-10-CM

## 2021-12-17 DIAGNOSIS — R79.89 ABNORMAL TSH: ICD-10-CM

## 2021-12-17 PROCEDURE — 99214 OFFICE O/P EST MOD 30 MIN: CPT | Performed by: INTERNAL MEDICINE

## 2021-12-17 PROCEDURE — 36415 COLL VENOUS BLD VENIPUNCTURE: CPT

## 2021-12-17 PROCEDURE — 93005 ELECTROCARDIOGRAM TRACING: CPT

## 2021-12-17 PROCEDURE — 84439 ASSAY OF FREE THYROXINE: CPT

## 2021-12-17 PROCEDURE — 84443 ASSAY THYROID STIM HORMONE: CPT

## 2021-12-17 PROCEDURE — 3074F SYST BP LT 130 MM HG: CPT | Performed by: INTERNAL MEDICINE

## 2021-12-17 PROCEDURE — 85025 COMPLETE CBC W/AUTO DIFF WBC: CPT

## 2021-12-17 PROCEDURE — 80053 COMPREHEN METABOLIC PANEL: CPT

## 2021-12-17 PROCEDURE — 3008F BODY MASS INDEX DOCD: CPT | Performed by: INTERNAL MEDICINE

## 2021-12-17 PROCEDURE — 93010 ELECTROCARDIOGRAM REPORT: CPT | Performed by: INTERNAL MEDICINE

## 2021-12-17 PROCEDURE — 3078F DIAST BP <80 MM HG: CPT | Performed by: INTERNAL MEDICINE

## 2021-12-17 NOTE — PROGRESS NOTES
History of Present Illness   Patient ID: Jessica Dennis is a 77year old male.   Today's Date: 12/17/21  Chief Complaint: Pre-Op (SX: 1/6/21 cataract surgery Dr. Dileep Joy)      HPI  Jessica Dennis presents for preoperative clearance for: LEF period of time: Patient denies. - Dyspnea/exertional: Patient denies.   - Respiratory Medications: Patient denies. Functional Capacity:   1. Perform ADLs- eating, dress, toilet (1 METs)? Yes, patient can perform.    2. Walk up flight of steps, hill, w regular rhythm. Pulses: Normal pulses. Heart sounds: Normal heart sounds. Pulmonary:      Effort: Pulmonary effort is normal. No respiratory distress. Breath sounds: Normal breath sounds. No wheezing.    Abdominal:      General: Abdomen is cervical fusion who presents for preoperative evaluation for left cataract surgery with Dr. Ceferino Latif on 1/6/2022. EKG, CMP and CBC are WNL. His greater than 4 METS of activity. He has no cardiac complaints. He has no pulmonary complaints.       [id number] [address]ro Latest Ref Range: 13.0 - 17.5 g/dL 13.3   Hematocrit Latest Ref Range: 39.0 - 53.0 % 39.8   Platelet Count Latest Ref Range: 150.0 - 450.0 10(3)uL 341.0   RBC Latest Ref Range: 3.80 - 5.80 x10(6)uL 4.56   MCH Latest Ref Range: 26.0 - 34.0 pg 29.2   [facility] -------------------------------------------------------------------------------------------------------------------  This letter (including any attachments) contains confidential information intended only for the addressee.  Any use or disclosure by any Call office with any questions or seek emergency care if necessary. Patient understands and agrees to follow directions and advice. This note was prepared using [employer] voice recognition dictation software. As a result errors may occur.  When id

## 2021-12-18 ENCOUNTER — TELEPHONE (OUTPATIENT)
Dept: INTERNAL MEDICINE CLINIC | Facility: CLINIC | Age: 66
End: 2021-12-18

## 2021-12-18 NOTE — TELEPHONE ENCOUNTER
Cataract surgery date: 1/6/22  Pre op clearance faxed to Dr. Rainey Arapahoe at [phone] (failed twice) refaxed to alternate Fax [phone].  fax confirmation recieved

## 2022-01-03 ENCOUNTER — LAB REQUISITION (OUTPATIENT)
Dept: SURGERY | Age: 67
End: 2022-01-03
Payer: MEDICARE

## 2022-01-03 DIAGNOSIS — Z01.818 PREOP EXAMINATION: ICD-10-CM

## 2022-01-05 LAB — SARS-COV-2 RNA RESP QL NAA+PROBE: NOT DETECTED

## 2022-01-29 ENCOUNTER — IMMUNIZATION (OUTPATIENT)
Dept: LAB | Facility: HOSPITAL | Age: 67
End: 2022-01-29
Attending: EMERGENCY MEDICINE
Payer: MEDICARE

## 2022-01-29 DIAGNOSIS — Z23 NEED FOR VACCINATION: Primary | ICD-10-CM

## 2022-01-29 PROCEDURE — 0064A SARSCOV2 VAC 50MCG/0.25ML IM: CPT

## 2022-02-07 ENCOUNTER — PATIENT MESSAGE (OUTPATIENT)
Dept: OTHER | Age: 67
End: 2022-02-07

## 2022-02-07 ENCOUNTER — TELEPHONE (OUTPATIENT)
Dept: INTERNAL MEDICINE CLINIC | Facility: CLINIC | Age: 67
End: 2022-02-07

## 2022-02-15 ENCOUNTER — LAB ENCOUNTER (OUTPATIENT)
Dept: LAB | Facility: HOSPITAL | Age: 67
End: 2022-02-15
Attending: INTERNAL MEDICINE
Payer: MEDICARE

## 2022-02-15 DIAGNOSIS — Z20.822 ENCOUNTER FOR SCREENING LABORATORY TESTING FOR COVID-19 VIRUS IN ASYMPTOMATIC PATIENT: ICD-10-CM

## 2022-02-15 LAB — SARS-COV-2 RNA RESP QL NAA+PROBE: NOT DETECTED

## 2022-04-22 ENCOUNTER — OFFICE VISIT (OUTPATIENT)
Dept: INTERNAL MEDICINE CLINIC | Facility: CLINIC | Age: 67
End: 2022-04-22
Payer: MEDICARE

## 2022-04-22 ENCOUNTER — LAB ENCOUNTER (OUTPATIENT)
Dept: LAB | Age: 67
End: 2022-04-22
Attending: INTERNAL MEDICINE
Payer: MEDICARE

## 2022-04-22 VITALS
BODY MASS INDEX: 24.5 KG/M2 | DIASTOLIC BLOOD PRESSURE: 69 MMHG | HEIGHT: 71 IN | WEIGHT: 175 LBS | HEART RATE: 89 BPM | SYSTOLIC BLOOD PRESSURE: 119 MMHG

## 2022-04-22 DIAGNOSIS — Z00.00 ENCOUNTER FOR MEDICARE ANNUAL WELLNESS EXAM: ICD-10-CM

## 2022-04-22 DIAGNOSIS — E11.9 TYPE 2 DIABETES MELLITUS WITHOUT COMPLICATION, WITHOUT LONG-TERM CURRENT USE OF INSULIN (HCC): ICD-10-CM

## 2022-04-22 DIAGNOSIS — E55.9 VITAMIN D DEFICIENCY: ICD-10-CM

## 2022-04-22 DIAGNOSIS — M21.861: ICD-10-CM

## 2022-04-22 DIAGNOSIS — M17.11 PRIMARY OSTEOARTHRITIS OF RIGHT KNEE: ICD-10-CM

## 2022-04-22 DIAGNOSIS — N13.8 BPH WITH OBSTRUCTION/LOWER URINARY TRACT SYMPTOMS: ICD-10-CM

## 2022-04-22 DIAGNOSIS — Z12.5 ENCOUNTER FOR SCREENING FOR MALIGNANT NEOPLASM OF PROSTATE: ICD-10-CM

## 2022-04-22 DIAGNOSIS — M47.816 LUMBAR SPONDYLOSIS: ICD-10-CM

## 2022-04-22 DIAGNOSIS — M51.37 DDD (DEGENERATIVE DISC DISEASE), LUMBOSACRAL: ICD-10-CM

## 2022-04-22 DIAGNOSIS — G89.29 CHRONIC RIGHT-SIDED LOW BACK PAIN WITH RIGHT-SIDED SCIATICA: ICD-10-CM

## 2022-04-22 DIAGNOSIS — M25.551 BILATERAL HIP PAIN: ICD-10-CM

## 2022-04-22 DIAGNOSIS — E11.59 HYPERTENSION ASSOCIATED WITH TYPE 2 DIABETES MELLITUS (HCC): ICD-10-CM

## 2022-04-22 DIAGNOSIS — M53.3 SI (SACROILIAC) JOINT DYSFUNCTION: ICD-10-CM

## 2022-04-22 DIAGNOSIS — N40.1 BPH WITH OBSTRUCTION/LOWER URINARY TRACT SYMPTOMS: ICD-10-CM

## 2022-04-22 DIAGNOSIS — G89.29 CHRONIC LEFT SHOULDER PAIN: ICD-10-CM

## 2022-04-22 DIAGNOSIS — E78.5 DYSLIPIDEMIA ASSOCIATED WITH TYPE 2 DIABETES MELLITUS (HCC): ICD-10-CM

## 2022-04-22 DIAGNOSIS — R29.898 RIGHT LEG WEAKNESS: ICD-10-CM

## 2022-04-22 DIAGNOSIS — M54.41 CHRONIC RIGHT-SIDED LOW BACK PAIN WITH RIGHT-SIDED SCIATICA: ICD-10-CM

## 2022-04-22 DIAGNOSIS — Z86.79 HX OF INTRACRANIAL HEMORRHAGE: ICD-10-CM

## 2022-04-22 DIAGNOSIS — M22.2X9 PATELLOFEMORAL PAIN SYNDROME, UNSPECIFIED LATERALITY: ICD-10-CM

## 2022-04-22 DIAGNOSIS — M48.061 FORAMINAL STENOSIS OF LUMBAR REGION: ICD-10-CM

## 2022-04-22 DIAGNOSIS — M25.552 BILATERAL HIP PAIN: ICD-10-CM

## 2022-04-22 DIAGNOSIS — E11.69 DYSLIPIDEMIA ASSOCIATED WITH TYPE 2 DIABETES MELLITUS (HCC): ICD-10-CM

## 2022-04-22 DIAGNOSIS — Z00.00 ENCOUNTER FOR MEDICARE ANNUAL WELLNESS EXAM: Primary | ICD-10-CM

## 2022-04-22 DIAGNOSIS — Z98.890 HX OF CERVICAL SPINE SURGERY: ICD-10-CM

## 2022-04-22 DIAGNOSIS — M47.816 FACET SYNDROME, LUMBAR: ICD-10-CM

## 2022-04-22 DIAGNOSIS — I15.2 HYPERTENSION ASSOCIATED WITH TYPE 2 DIABETES MELLITUS (HCC): ICD-10-CM

## 2022-04-22 DIAGNOSIS — M25.512 CHRONIC LEFT SHOULDER PAIN: ICD-10-CM

## 2022-04-22 DIAGNOSIS — R20.2 INTERMITTENT TINGLING SENSATION OF RIGHT HAND AND FOOT: ICD-10-CM

## 2022-04-22 LAB
ALBUMIN SERPL-MCNC: 4.2 G/DL (ref 3.4–5)
ALBUMIN/GLOB SERPL: 1.2 {RATIO} (ref 1–2)
ALP LIVER SERPL-CCNC: 95 U/L
ALT SERPL-CCNC: 52 U/L
ANION GAP SERPL CALC-SCNC: 6 MMOL/L (ref 0–18)
AST SERPL-CCNC: 32 U/L (ref 15–37)
BILIRUB SERPL-MCNC: 1.1 MG/DL (ref 0.1–2)
BUN BLD-MCNC: 18 MG/DL (ref 7–18)
BUN/CREAT SERPL: 15.1 (ref 10–20)
CALCIUM BLD-MCNC: 9.9 MG/DL (ref 8.5–10.1)
CHLORIDE SERPL-SCNC: 99 MMOL/L (ref 98–112)
CHOLEST SERPL-MCNC: 108 MG/DL (ref ?–200)
CO2 SERPL-SCNC: 28 MMOL/L (ref 21–32)
COMPLEXED PSA SERPL-MCNC: 1.48 NG/ML (ref ?–4)
CREAT BLD-MCNC: 1.19 MG/DL
CREAT UR-SCNC: 151 MG/DL
DEPRECATED RDW RBC AUTO: 41.3 FL (ref 35.1–46.3)
ERYTHROCYTE [DISTWIDTH] IN BLOOD BY AUTOMATED COUNT: 12.8 % (ref 11–15)
EST. AVERAGE GLUCOSE BLD GHB EST-MCNC: 154 MG/DL (ref 68–126)
FASTING PATIENT LIPID ANSWER: NO
FASTING STATUS PATIENT QL REPORTED: NO
GLOBULIN PLAS-MCNC: 3.6 G/DL (ref 2.8–4.4)
GLUCOSE BLD-MCNC: 127 MG/DL (ref 70–99)
HBA1C MFR BLD: 7 % (ref ?–5.7)
HCT VFR BLD AUTO: 39.3 %
HDLC SERPL-MCNC: 39 MG/DL (ref 40–59)
HGB BLD-MCNC: 13 G/DL
LDLC SERPL CALC-MCNC: 46 MG/DL (ref ?–100)
MCH RBC QN AUTO: 29.3 PG (ref 26–34)
MCHC RBC AUTO-ENTMCNC: 33.1 G/DL (ref 31–37)
MCV RBC AUTO: 88.5 FL
MICROALBUMIN UR-MCNC: 1.19 MG/DL
MICROALBUMIN/CREAT 24H UR-RTO: 7.9 UG/MG (ref ?–30)
NONHDLC SERPL-MCNC: 69 MG/DL (ref ?–130)
OSMOLALITY SERPL CALC.SUM OF ELEC: 279 MOSM/KG (ref 275–295)
PLATELET # BLD AUTO: 347 10(3)UL (ref 150–450)
POTASSIUM SERPL-SCNC: 4.9 MMOL/L (ref 3.5–5.1)
PROT SERPL-MCNC: 7.8 G/DL (ref 6.4–8.2)
RBC # BLD AUTO: 4.44 X10(6)UL
SODIUM SERPL-SCNC: 133 MMOL/L (ref 136–145)
T4 FREE SERPL-MCNC: 1 NG/DL (ref 0.8–1.7)
TRIGL SERPL-MCNC: 128 MG/DL (ref 30–149)
TSI SER-ACNC: 4.9 MIU/ML (ref 0.36–3.74)
VIT D+METAB SERPL-MCNC: 49.1 NG/ML (ref 30–100)
VLDLC SERPL CALC-MCNC: 18 MG/DL (ref 0–30)
WBC # BLD AUTO: 10.8 X10(3) UL (ref 4–11)

## 2022-04-22 PROCEDURE — 82043 UR ALBUMIN QUANTITATIVE: CPT

## 2022-04-22 PROCEDURE — 80061 LIPID PANEL: CPT

## 2022-04-22 PROCEDURE — 85027 COMPLETE CBC AUTOMATED: CPT

## 2022-04-22 PROCEDURE — 84443 ASSAY THYROID STIM HORMONE: CPT

## 2022-04-22 PROCEDURE — 83036 HEMOGLOBIN GLYCOSYLATED A1C: CPT

## 2022-04-22 PROCEDURE — 84439 ASSAY OF FREE THYROXINE: CPT

## 2022-04-22 PROCEDURE — 80053 COMPREHEN METABOLIC PANEL: CPT

## 2022-04-22 PROCEDURE — 82570 ASSAY OF URINE CREATININE: CPT

## 2022-04-22 PROCEDURE — 82306 VITAMIN D 25 HYDROXY: CPT

## 2022-04-22 PROCEDURE — 36415 COLL VENOUS BLD VENIPUNCTURE: CPT

## 2022-04-22 RX ORDER — TAMSULOSIN HYDROCHLORIDE 0.4 MG/1
0.4 CAPSULE ORAL DAILY
Qty: 90 CAPSULE | Refills: 3 | Status: SHIPPED | OUTPATIENT
Start: 2022-04-22

## 2022-04-22 RX ORDER — LISINOPRIL 20 MG/1
20 TABLET ORAL NIGHTLY
Qty: 90 TABLET | Refills: 3 | Status: SHIPPED | OUTPATIENT
Start: 2022-04-22

## 2022-04-22 RX ORDER — ATORVASTATIN CALCIUM 40 MG/1
40 TABLET, FILM COATED ORAL NIGHTLY
Qty: 90 TABLET | Refills: 3 | Status: SHIPPED | OUTPATIENT
Start: 2022-04-22

## 2022-05-20 ENCOUNTER — TELEPHONE (OUTPATIENT)
Dept: INTERNAL MEDICINE CLINIC | Facility: CLINIC | Age: 67
End: 2022-05-20

## 2022-05-20 ENCOUNTER — PATIENT MESSAGE (OUTPATIENT)
Dept: INTERNAL MEDICINE CLINIC | Facility: CLINIC | Age: 67
End: 2022-05-20

## 2022-05-20 NOTE — TELEPHONE ENCOUNTER
First Call attempt. Left Voicemail to call back our office. Office phone number provided with office hours. Advised check mydeco message.

## 2022-05-23 ENCOUNTER — OFFICE VISIT (OUTPATIENT)
Dept: INTERNAL MEDICINE CLINIC | Facility: CLINIC | Age: 67
End: 2022-05-23
Payer: MEDICARE

## 2022-05-23 ENCOUNTER — EKG ENCOUNTER (OUTPATIENT)
Dept: LAB | Age: 67
End: 2022-05-23
Attending: INTERNAL MEDICINE
Payer: MEDICARE

## 2022-05-23 VITALS
SYSTOLIC BLOOD PRESSURE: 119 MMHG | HEIGHT: 71 IN | WEIGHT: 180 LBS | BODY MASS INDEX: 25.2 KG/M2 | HEART RATE: 86 BPM | DIASTOLIC BLOOD PRESSURE: 61 MMHG

## 2022-05-23 DIAGNOSIS — Z72.0 CHEWING TOBACCO USE: ICD-10-CM

## 2022-05-23 DIAGNOSIS — E11.9 TYPE 2 DIABETES MELLITUS WITHOUT COMPLICATION, WITHOUT LONG-TERM CURRENT USE OF INSULIN (HCC): ICD-10-CM

## 2022-05-23 DIAGNOSIS — K21.00 GASTROESOPHAGEAL REFLUX DISEASE WITH ESOPHAGITIS, UNSPECIFIED WHETHER HEMORRHAGE: ICD-10-CM

## 2022-05-23 DIAGNOSIS — B35.9 RINGWORM: ICD-10-CM

## 2022-05-23 DIAGNOSIS — R07.2 PRECORDIAL PAIN: ICD-10-CM

## 2022-05-23 DIAGNOSIS — R07.2 PRECORDIAL PAIN: Primary | ICD-10-CM

## 2022-05-23 PROCEDURE — 93010 ELECTROCARDIOGRAM REPORT: CPT | Performed by: INTERNAL MEDICINE

## 2022-05-23 PROCEDURE — 93005 ELECTROCARDIOGRAM TRACING: CPT

## 2022-05-23 RX ORDER — PANTOPRAZOLE SODIUM 20 MG/1
TABLET, DELAYED RELEASE ORAL
Qty: 104 TABLET | Refills: 0 | Status: SHIPPED | OUTPATIENT
Start: 2022-05-23 | End: 2022-08-21

## 2022-05-23 RX ORDER — CLOTRIMAZOLE AND BETAMETHASONE DIPROPIONATE 10; .64 MG/G; MG/G
1 CREAM TOPICAL 2 TIMES DAILY
Qty: 45 G | Refills: 1 | Status: SHIPPED | OUTPATIENT
Start: 2022-05-23

## 2022-05-23 RX ORDER — CLOTRIMAZOLE AND BETAMETHASONE DIPROPIONATE 10; .64 MG/G; MG/G
1 CREAM TOPICAL 2 TIMES DAILY
COMMUNITY
Start: 2022-01-06 | End: 2022-05-23

## 2022-05-23 NOTE — PATIENT INSTRUCTIONS
Today you will start taking pantoprazole 20 mg twice daily, you will go downstairs and get an EKG. I would like for you to call [facility] Dr. Fer Fraga to schedule an appointment for further cardiac evaluation. Let me know if you have any issues and I can reach out to their service.

## 2022-08-25 DIAGNOSIS — K21.00 GASTROESOPHAGEAL REFLUX DISEASE WITH ESOPHAGITIS, UNSPECIFIED WHETHER HEMORRHAGE: ICD-10-CM

## 2022-08-26 RX ORDER — PANTOPRAZOLE SODIUM 20 MG/1
20 TABLET, DELAYED RELEASE ORAL
Qty: 90 TABLET | Refills: 1 | Status: SHIPPED | OUTPATIENT
Start: 2022-08-26

## 2022-08-26 NOTE — TELEPHONE ENCOUNTER
Refill passed per [address] [place] protocol.     Requested Prescriptions   Pending Prescriptions Disp Refills    PANTOPRAZOLE 20 MG Oral Tab EC [Pharmacy Med Name: PANTOPRAZOLE 20MG TABLETS] 104 tablet 0     Sig: TAKE 1 TABLET(20 MG) BY MOUTH TWICE DAILY BEFORE MEALS FOR 14 DAYS THEN TAKE 1 TABLET(20 MG) BY MOUTH EVERY MORNING BEFORE BREAKFAST        Gastrointestional Medication Protocol Passed - 8/25/2022  6:27 PM        Passed - In person appointment or virtual visit in the past 12 mos or appointment in next 3 mos       Recent Outpatient Visits              3 months ago Precordial pain    Willem Choi MD    Office Visit    4 months ago Encounter for [employer] annual wellness exam    [address] Pé[address] MD    Office Visit    8 months ago Pre-operative examination    [address] P[address] MD    Office Visit    9 months ago Ishmael Camarena MD    Office Visit    10 months ago Type 2 diabetes mellitus without complication, without long-term current use of insulin [facility])    [address] P[address] MD    Office Visit                     Recent Outpatient Visits              3 months ago Precordial pain    [address] Pé[address] MD    Office Visit    4 months ago Encounter for [employer] annual wellness exam    [address] P[address] MD    Office Visit    8 months ago Pre-operative examination    [address] Pé[address] MD    Office Visit    9 months ago Swapnil Sam MD    Office Visit    10 months ago Type 2 diabetes mellitus without complication, without long-term current use of insulin [facility])    [address] Pé[address] MD    Office Visit

## 2022-11-18 ENCOUNTER — IMMUNIZATION (OUTPATIENT)
Dept: INTERNAL MEDICINE CLINIC | Facility: CLINIC | Age: 67
End: 2022-11-18
Payer: MEDICARE

## 2022-11-18 DIAGNOSIS — Z23 NEED FOR VACCINATION: Primary | ICD-10-CM

## 2022-11-18 PROCEDURE — G0008 ADMIN INFLUENZA VIRUS VAC: HCPCS | Performed by: INTERNAL MEDICINE

## 2022-11-18 PROCEDURE — 90662 IIV NO PRSV INCREASED AG IM: CPT | Performed by: INTERNAL MEDICINE

## 2023-02-23 ENCOUNTER — OFFICE VISIT (OUTPATIENT)
Dept: INTERNAL MEDICINE CLINIC | Facility: CLINIC | Age: 68
End: 2023-02-23

## 2023-02-23 ENCOUNTER — LAB ENCOUNTER (OUTPATIENT)
Dept: LAB | Age: 68
End: 2023-02-23
Attending: INTERNAL MEDICINE
Payer: MEDICARE

## 2023-02-23 ENCOUNTER — TELEPHONE (OUTPATIENT)
Dept: INTERNAL MEDICINE CLINIC | Facility: CLINIC | Age: 68
End: 2023-02-23

## 2023-02-23 VITALS
HEART RATE: 90 BPM | WEIGHT: 179 LBS | BODY MASS INDEX: 25.06 KG/M2 | SYSTOLIC BLOOD PRESSURE: 111 MMHG | DIASTOLIC BLOOD PRESSURE: 51 MMHG | HEIGHT: 71 IN

## 2023-02-23 DIAGNOSIS — M47.816 FACET SYNDROME, LUMBAR: ICD-10-CM

## 2023-02-23 DIAGNOSIS — M25.552 BILATERAL HIP PAIN: ICD-10-CM

## 2023-02-23 DIAGNOSIS — E11.59 HYPERTENSION ASSOCIATED WITH TYPE 2 DIABETES MELLITUS (HCC): ICD-10-CM

## 2023-02-23 DIAGNOSIS — E11.69 DYSLIPIDEMIA ASSOCIATED WITH TYPE 2 DIABETES MELLITUS (HCC): ICD-10-CM

## 2023-02-23 DIAGNOSIS — E78.5 DYSLIPIDEMIA ASSOCIATED WITH TYPE 2 DIABETES MELLITUS (HCC): ICD-10-CM

## 2023-02-23 DIAGNOSIS — Z12.5 ENCOUNTER FOR SCREENING FOR MALIGNANT NEOPLASM OF PROSTATE: ICD-10-CM

## 2023-02-23 DIAGNOSIS — M17.11 PRIMARY OSTEOARTHRITIS OF RIGHT KNEE: ICD-10-CM

## 2023-02-23 DIAGNOSIS — N40.1 BPH WITH OBSTRUCTION/LOWER URINARY TRACT SYMPTOMS: ICD-10-CM

## 2023-02-23 DIAGNOSIS — M53.3 SI (SACROILIAC) JOINT DYSFUNCTION: ICD-10-CM

## 2023-02-23 DIAGNOSIS — M25.512 CHRONIC LEFT SHOULDER PAIN: ICD-10-CM

## 2023-02-23 DIAGNOSIS — M21.861: ICD-10-CM

## 2023-02-23 DIAGNOSIS — E11.9 TYPE 2 DIABETES MELLITUS WITHOUT COMPLICATION, WITHOUT LONG-TERM CURRENT USE OF INSULIN (HCC): ICD-10-CM

## 2023-02-23 DIAGNOSIS — M51.37 DDD (DEGENERATIVE DISC DISEASE), LUMBOSACRAL: ICD-10-CM

## 2023-02-23 DIAGNOSIS — N13.8 BPH WITH OBSTRUCTION/LOWER URINARY TRACT SYMPTOMS: ICD-10-CM

## 2023-02-23 DIAGNOSIS — Z72.0 CHEWING TOBACCO USE: ICD-10-CM

## 2023-02-23 DIAGNOSIS — E55.9 VITAMIN D DEFICIENCY: ICD-10-CM

## 2023-02-23 DIAGNOSIS — Z98.890 HX OF CERVICAL SPINE SURGERY: ICD-10-CM

## 2023-02-23 DIAGNOSIS — M22.2X9 PATELLOFEMORAL PAIN SYNDROME, UNSPECIFIED LATERALITY: ICD-10-CM

## 2023-02-23 DIAGNOSIS — I15.2 HYPERTENSION ASSOCIATED WITH TYPE 2 DIABETES MELLITUS (HCC): ICD-10-CM

## 2023-02-23 DIAGNOSIS — Z86.79 HX OF INTRACRANIAL HEMORRHAGE: ICD-10-CM

## 2023-02-23 DIAGNOSIS — M48.061 FORAMINAL STENOSIS OF LUMBAR REGION: ICD-10-CM

## 2023-02-23 DIAGNOSIS — M25.551 BILATERAL HIP PAIN: ICD-10-CM

## 2023-02-23 DIAGNOSIS — Z00.00 ENCOUNTER FOR ANNUAL HEALTH EXAMINATION: Primary | ICD-10-CM

## 2023-02-23 DIAGNOSIS — M54.41 CHRONIC RIGHT-SIDED LOW BACK PAIN WITH RIGHT-SIDED SCIATICA: ICD-10-CM

## 2023-02-23 DIAGNOSIS — G89.29 CHRONIC RIGHT-SIDED LOW BACK PAIN WITH RIGHT-SIDED SCIATICA: ICD-10-CM

## 2023-02-23 DIAGNOSIS — R29.898 RIGHT LEG WEAKNESS: ICD-10-CM

## 2023-02-23 DIAGNOSIS — G89.29 CHRONIC LEFT SHOULDER PAIN: ICD-10-CM

## 2023-02-23 LAB
ALBUMIN SERPL-MCNC: 4.2 G/DL (ref 3.4–5)
ALBUMIN/GLOB SERPL: 1.1 {RATIO} (ref 1–2)
ALP LIVER SERPL-CCNC: 69 U/L
ALT SERPL-CCNC: 53 U/L
ANION GAP SERPL CALC-SCNC: 10 MMOL/L (ref 0–18)
AST SERPL-CCNC: 30 U/L (ref 15–37)
BILIRUB SERPL-MCNC: 1.3 MG/DL (ref 0.1–2)
BUN BLD-MCNC: 23 MG/DL (ref 7–18)
BUN/CREAT SERPL: 18.3 (ref 10–20)
CALCIUM BLD-MCNC: 10.1 MG/DL (ref 8.5–10.1)
CHLORIDE SERPL-SCNC: 100 MMOL/L (ref 98–112)
CHOLEST SERPL-MCNC: 111 MG/DL (ref ?–200)
CO2 SERPL-SCNC: 25 MMOL/L (ref 21–32)
COMPLEXED PSA SERPL-MCNC: 3.18 NG/ML (ref ?–4)
CREAT BLD-MCNC: 1.26 MG/DL
CREAT UR-SCNC: 86.3 MG/DL
DEPRECATED RDW RBC AUTO: 38.4 FL (ref 35.1–46.3)
ERYTHROCYTE [DISTWIDTH] IN BLOOD BY AUTOMATED COUNT: 12.4 % (ref 11–15)
EST. AVERAGE GLUCOSE BLD GHB EST-MCNC: 154 MG/DL (ref 68–126)
FASTING PATIENT LIPID ANSWER: NO
FASTING STATUS PATIENT QL REPORTED: NO
GFR SERPLBLD BASED ON 1.73 SQ M-ARVRAT: 63 ML/MIN/1.73M2 (ref 60–?)
GLOBULIN PLAS-MCNC: 3.7 G/DL (ref 2.8–4.4)
GLUCOSE BLD-MCNC: 122 MG/DL (ref 70–99)
HBA1C MFR BLD: 7 % (ref ?–5.7)
HCT VFR BLD AUTO: 37 %
HDLC SERPL-MCNC: 36 MG/DL (ref 40–59)
HGB BLD-MCNC: 13 G/DL
LDLC SERPL CALC-MCNC: 55 MG/DL (ref ?–100)
MCH RBC QN AUTO: 30 PG (ref 26–34)
MCHC RBC AUTO-ENTMCNC: 35.1 G/DL (ref 31–37)
MCV RBC AUTO: 85.5 FL
MICROALBUMIN UR-MCNC: 0.6 MG/DL
MICROALBUMIN/CREAT 24H UR-RTO: 7 UG/MG (ref ?–30)
NONHDLC SERPL-MCNC: 75 MG/DL (ref ?–130)
OSMOLALITY SERPL CALC.SUM OF ELEC: 285 MOSM/KG (ref 275–295)
PLATELET # BLD AUTO: 317 10(3)UL (ref 150–450)
POTASSIUM SERPL-SCNC: 4.3 MMOL/L (ref 3.5–5.1)
PROT SERPL-MCNC: 7.9 G/DL (ref 6.4–8.2)
RBC # BLD AUTO: 4.33 X10(6)UL
SODIUM SERPL-SCNC: 135 MMOL/L (ref 136–145)
TRIGL SERPL-MCNC: 104 MG/DL (ref 30–149)
TSI SER-ACNC: 2.72 MIU/ML (ref 0.36–3.74)
VIT D+METAB SERPL-MCNC: 71 NG/ML (ref 30–100)
VLDLC SERPL CALC-MCNC: 15 MG/DL (ref 0–30)
WBC # BLD AUTO: 9.6 X10(3) UL (ref 4–11)

## 2023-02-23 PROCEDURE — 36415 COLL VENOUS BLD VENIPUNCTURE: CPT | Performed by: INTERNAL MEDICINE

## 2023-02-23 PROCEDURE — 82306 VITAMIN D 25 HYDROXY: CPT | Performed by: INTERNAL MEDICINE

## 2023-02-23 PROCEDURE — 82570 ASSAY OF URINE CREATININE: CPT

## 2023-02-23 PROCEDURE — 83036 HEMOGLOBIN GLYCOSYLATED A1C: CPT | Performed by: INTERNAL MEDICINE

## 2023-02-23 PROCEDURE — 82043 UR ALBUMIN QUANTITATIVE: CPT

## 2023-02-23 PROCEDURE — 84443 ASSAY THYROID STIM HORMONE: CPT | Performed by: INTERNAL MEDICINE

## 2023-02-23 PROCEDURE — 85027 COMPLETE CBC AUTOMATED: CPT | Performed by: INTERNAL MEDICINE

## 2023-02-23 PROCEDURE — 80061 LIPID PANEL: CPT | Performed by: INTERNAL MEDICINE

## 2023-02-23 PROCEDURE — 80053 COMPREHEN METABOLIC PANEL: CPT | Performed by: INTERNAL MEDICINE

## 2023-02-23 RX ORDER — LISINOPRIL 20 MG/1
20 TABLET ORAL NIGHTLY
Qty: 90 TABLET | Refills: 9 | Status: SHIPPED | OUTPATIENT
Start: 2023-02-23

## 2023-02-23 RX ORDER — TAMSULOSIN HYDROCHLORIDE 0.4 MG/1
0.4 CAPSULE ORAL DAILY
Qty: 90 CAPSULE | Refills: 9 | Status: SHIPPED | OUTPATIENT
Start: 2023-02-23

## 2023-02-23 RX ORDER — ATORVASTATIN CALCIUM 40 MG/1
40 TABLET, FILM COATED ORAL NIGHTLY
Qty: 90 TABLET | Refills: 9 | Status: SHIPPED | OUTPATIENT
Start: 2023-02-23

## 2023-02-23 RX ORDER — ASPIRIN 81 MG/1
81 TABLET ORAL DAILY
Qty: 360 TABLET | Refills: 9 | Status: SHIPPED | OUTPATIENT
Start: 2023-02-23

## 2023-02-23 NOTE — PATIENT INSTRUCTIONS
We will continue with all of your current medications at the current dosage however I will increase jardiance from 10 mg to 25 mg, you may double up on your 10 mg tablets until you are out, once I see your labs and you start the 25 mg Jardiance we can then reduce your metformin from thousand twice a day to thousand once a day with the ultimate goal of coming off of metformin if your A1c remains less than 7. Johnny Rosenberg's SCREENING SCHEDULE   Tests on this list are recommended by your physician but may not be covered, or covered at this frequency, by your insurer. Please check with your insurance carrier before scheduling to verify coverage.    PREVENTATIVE SERVICES FREQUENCY &  COVERAGE DETAILS LAST COMPLETION DATE   Diabetes Screening    Fasting Blood Sugar / Glucose    One screening every 12 months if never tested or if previously tested but not diagnosed with pre-diabetes   One screening every 6 months if diagnosed with pre-diabetes Lab Results   Component Value Date     (H) 04/22/2022        Cardiovascular Disease Screening    Lipid Panel  Cholesterol  Lipoprotein (HDL)  Triglycerides Covered every 5 years for all Medicare beneficiaries without apparent signs or symptoms of cardiovascular disease Lab Results   Component Value Date    CHOLEST 108 04/22/2022    HDL 39 (L) 04/22/2022    LDL 46 04/22/2022    TRIG 128 04/22/2022         Electrocardiogram (EKG)   Covered if needed at Welcome to Medicare, and non-screening if indicated for medical reasons 05/23/2022      Ultrasound Screening for Abdominal Aortic Aneurysm (AAA) Covered once in a lifetime for one of the following risk factors    Men who are 73-68 years old and have ever smoked    Anyone with a family history -     Colorectal Cancer Screening  Covered for ages 52-80; only need ONE of the following:    Colonoscopy   Covered every 10 years    Covered every 2 years if patient is at high risk or previous colonoscopy was abnormal 11/04/2020    Colorectal Cancer Screening due on 11/04/2025    Flexible Sigmoidoscopy   Covered every 4 years -    Fecal Occult Blood Test Covered annually -   Prostate Cancer Screening    Prostate-Specific Antigen (PSA) Annually Lab Results   Component Value Date    PSA 1.0 11/27/2017     PSA due on 04/22/2024   Immunizations    Influenza Covered once per flu season  Please get every year 11/18/2022  No recommendations at this time    Pneumococcal Each vaccine ([id number] & Kctlnwssm56) covered once after 65 Prevnar 13: -    [id number]: 07/16/2021     Pneumococcal Vaccination(2 - PCV) due on 07/16/2022    Hepatitis B One screening covered for patients with certain risk factors   -  No recommendations at this time    Tetanus Toxoid Not covered by Medicare Part B unless medically necessary (cut with metal); may be covered with your pharmacy prescription benefits -    Tetanus, Diptheria and Pertusis TD and TDaP Not covered by Medicare Part B -  No recommendations at this time    Zoster Not covered by Medicare Part B; may be covered with your pharmacy  prescription benefits -  Zoster Vaccines(1 of 2) Never done       Diabetes      Hemoglobin A1C Annually; if last result is elevated, may be asked to retest more frequently.     Medicare covers every 3 months Lab Results   Component Value Date     (H) 04/22/2022    A1C 7.0 (H) 04/22/2022       Hemoglobin A1C Test due on 10/22/2022    Creat/alb ratio Annually Lab Results   Component Value Date    MICROALBCREA 7.9 04/22/2022       LDL Annually Lab Results   Component Value Date    LDL 46 04/22/2022       Dilated Eye Exam Annually 07/16/2018       Annual Monitoring of Persistent Medications (ACE/ARB, digoxin diuretics, anticonvulsants)    Potassium Annually Lab Results   Component Value Date    K 4.9 04/22/2022         Creatinine   Annually Lab Results   Component Value Date    CREATSERUM 1.19 04/22/2022         BUN Annually Lab Results   Component Value Date    BUN 18 04/22/2022       Drug Serum Conc Annually No results found for: DIGOXIN, DIG, VALP

## 2023-02-23 NOTE — TELEPHONE ENCOUNTER
Pharmacy calling regarding:  empagliflozin 25 MG Oral Tab   The order is for 25mg, dose says 10mg daily, please clarify.

## 2023-02-27 PROBLEM — R20.2 INTERMITTENT TINGLING SENSATION OF RIGHT HAND AND FOOT: Status: RESOLVED | Noted: 2018-10-23 | Resolved: 2023-02-27

## 2023-02-27 PROBLEM — M47.816 LUMBAR SPONDYLOSIS: Status: RESOLVED | Noted: 2019-10-04 | Resolved: 2023-02-27

## 2023-03-08 ENCOUNTER — PATIENT MESSAGE (OUTPATIENT)
Dept: INTERNAL MEDICINE CLINIC | Facility: CLINIC | Age: 68
End: 2023-03-08

## 2023-03-08 DIAGNOSIS — K21.00 GASTROESOPHAGEAL REFLUX DISEASE WITH ESOPHAGITIS, UNSPECIFIED WHETHER HEMORRHAGE: ICD-10-CM

## 2023-03-08 RX ORDER — PANTOPRAZOLE SODIUM 20 MG/1
TABLET, DELAYED RELEASE ORAL
COMMUNITY
Start: 2023-03-01 | End: 2023-03-08

## 2023-03-08 RX ORDER — PANTOPRAZOLE SODIUM 20 MG/1
20 TABLET, DELAYED RELEASE ORAL
Qty: 90 TABLET | Refills: 3 | Status: SHIPPED | OUTPATIENT
Start: 2023-03-08

## 2023-03-09 NOTE — TELEPHONE ENCOUNTER
Refill passed per [place] [employer] [place], [facility] protocol.     .  Requested Prescriptions   Pending Prescriptions Disp Refills    pantoprazole 20 MG Oral Tab EC 90 tablet 3     Sig: Take 1 tablet (20 mg total) by mouth every morning before breakfast.       Gastrointestional Medication Protocol Passed - 3/8/2023  4:49 PM        Passed - In person appointment or virtual visit in the past 12 mos or appointment in next 3 mos     Recent Outpatient Visits              1 week ago Encounter for annual health examination    Daria Goldman MD    Office Visit    9 months ago Precordial pain    Daria Goldman MD    Office Visit    10 months ago Encounter for [employer] annual wellness exam    Daria Goldman MD    Office Visit    1 year ago Pre-operative examination    Daria Goldman MD    Office Visit    1 year ago Daria Maxwell MD    Office Visit                           Recent Outpatient Visits              1 week ago Encounter for annual health examination    Daria Goldman MD    Office Visit    9 months ago Precordial pain    Daria Goldman MD    Office Visit    10 months ago Encounter for [employer] annual wellness exam    Daria Goldman MD    Office Visit    1 year ago Pre-operative examination    Daria Goldman MD    Office Visit    1 year ago Daria Maxwell MD    Office Visit

## 2023-09-14 ENCOUNTER — LAB ENCOUNTER (OUTPATIENT)
Dept: LAB | Age: 68
End: 2023-09-14
Attending: INTERNAL MEDICINE
Payer: MEDICARE

## 2023-09-14 ENCOUNTER — OFFICE VISIT (OUTPATIENT)
Dept: INTERNAL MEDICINE CLINIC | Facility: CLINIC | Age: 68
End: 2023-09-14

## 2023-09-14 VITALS
DIASTOLIC BLOOD PRESSURE: 70 MMHG | OXYGEN SATURATION: 99 % | BODY MASS INDEX: 24.92 KG/M2 | HEIGHT: 71 IN | SYSTOLIC BLOOD PRESSURE: 123 MMHG | HEART RATE: 96 BPM | WEIGHT: 178 LBS

## 2023-09-14 DIAGNOSIS — R07.9 CHEST PAIN, UNSPECIFIED TYPE: ICD-10-CM

## 2023-09-14 DIAGNOSIS — Z23 FLU VACCINE NEED: ICD-10-CM

## 2023-09-14 DIAGNOSIS — M54.12 CERVICAL RADICULITIS: Primary | ICD-10-CM

## 2023-09-14 DIAGNOSIS — R00.2 HEART PALPITATIONS: ICD-10-CM

## 2023-09-14 LAB
ATRIAL RATE: 76 BPM
P AXIS: 71 DEGREES
P-R INTERVAL: 154 MS
Q-T INTERVAL: 356 MS
QRS DURATION: 80 MS
QTC CALCULATION (BEZET): 400 MS
R AXIS: 56 DEGREES
T AXIS: 38 DEGREES
TROPONIN I HIGH SENSITIVITY: 5 NG/L
VENTRICULAR RATE: 76 BPM

## 2023-09-14 PROCEDURE — 90662 IIV NO PRSV INCREASED AG IM: CPT | Performed by: INTERNAL MEDICINE

## 2023-09-14 PROCEDURE — 1159F MED LIST DOCD IN RCRD: CPT | Performed by: INTERNAL MEDICINE

## 2023-09-14 PROCEDURE — 93010 ELECTROCARDIOGRAM REPORT: CPT | Performed by: INTERNAL MEDICINE

## 2023-09-14 PROCEDURE — 99214 OFFICE O/P EST MOD 30 MIN: CPT | Performed by: INTERNAL MEDICINE

## 2023-09-14 PROCEDURE — 3074F SYST BP LT 130 MM HG: CPT | Performed by: INTERNAL MEDICINE

## 2023-09-14 PROCEDURE — G0008 ADMIN INFLUENZA VIRUS VAC: HCPCS | Performed by: INTERNAL MEDICINE

## 2023-09-14 PROCEDURE — 1160F RVW MEDS BY RX/DR IN RCRD: CPT | Performed by: INTERNAL MEDICINE

## 2023-09-14 PROCEDURE — 3008F BODY MASS INDEX DOCD: CPT | Performed by: INTERNAL MEDICINE

## 2023-09-14 PROCEDURE — 3078F DIAST BP <80 MM HG: CPT | Performed by: INTERNAL MEDICINE

## 2023-09-14 PROCEDURE — 93005 ELECTROCARDIOGRAM TRACING: CPT

## 2023-09-14 RX ORDER — PREGABALIN 25 MG/1
CAPSULE ORAL
Qty: 173 CAPSULE | Refills: 0 | Status: SHIPPED | OUTPATIENT
Start: 2023-09-14 | End: 2023-12-13

## 2023-10-12 ENCOUNTER — OFFICE VISIT (OUTPATIENT)
Dept: INTERNAL MEDICINE CLINIC | Facility: CLINIC | Age: 68
End: 2023-10-12

## 2023-10-12 VITALS
WEIGHT: 178 LBS | BODY MASS INDEX: 25 KG/M2 | HEART RATE: 93 BPM | SYSTOLIC BLOOD PRESSURE: 128 MMHG | DIASTOLIC BLOOD PRESSURE: 68 MMHG

## 2023-10-12 DIAGNOSIS — E11.59 HYPERTENSION ASSOCIATED WITH TYPE 2 DIABETES MELLITUS: ICD-10-CM

## 2023-10-12 DIAGNOSIS — N40.1 BPH WITH OBSTRUCTION/LOWER URINARY TRACT SYMPTOMS: ICD-10-CM

## 2023-10-12 DIAGNOSIS — I15.2 HYPERTENSION ASSOCIATED WITH TYPE 2 DIABETES MELLITUS: ICD-10-CM

## 2023-10-12 DIAGNOSIS — E11.69 DYSLIPIDEMIA ASSOCIATED WITH TYPE 2 DIABETES MELLITUS: ICD-10-CM

## 2023-10-12 DIAGNOSIS — N13.8 BPH WITH OBSTRUCTION/LOWER URINARY TRACT SYMPTOMS: ICD-10-CM

## 2023-10-12 DIAGNOSIS — M54.12 CERVICAL RADICULITIS: Primary | ICD-10-CM

## 2023-10-12 DIAGNOSIS — E11.9 TYPE 2 DIABETES MELLITUS WITHOUT COMPLICATION, WITHOUT LONG-TERM CURRENT USE OF INSULIN (HCC): ICD-10-CM

## 2023-10-12 DIAGNOSIS — E78.5 DYSLIPIDEMIA ASSOCIATED WITH TYPE 2 DIABETES MELLITUS: ICD-10-CM

## 2023-10-12 PROCEDURE — 1160F RVW MEDS BY RX/DR IN RCRD: CPT | Performed by: INTERNAL MEDICINE

## 2023-10-12 PROCEDURE — 1159F MED LIST DOCD IN RCRD: CPT | Performed by: INTERNAL MEDICINE

## 2023-10-12 PROCEDURE — 3074F SYST BP LT 130 MM HG: CPT | Performed by: INTERNAL MEDICINE

## 2023-10-12 PROCEDURE — 3078F DIAST BP <80 MM HG: CPT | Performed by: INTERNAL MEDICINE

## 2023-10-12 PROCEDURE — 99214 OFFICE O/P EST MOD 30 MIN: CPT | Performed by: INTERNAL MEDICINE

## 2023-10-12 RX ORDER — ATORVASTATIN CALCIUM 40 MG/1
40 TABLET, FILM COATED ORAL NIGHTLY
Qty: 90 TABLET | Refills: 9 | Status: SHIPPED | OUTPATIENT
Start: 2023-10-12

## 2023-10-12 RX ORDER — PREGABALIN 50 MG/1
50 CAPSULE ORAL 2 TIMES DAILY
Qty: 180 CAPSULE | Refills: 3 | Status: SHIPPED | OUTPATIENT
Start: 2023-10-12

## 2023-10-12 RX ORDER — TAMSULOSIN HYDROCHLORIDE 0.4 MG/1
0.4 CAPSULE ORAL DAILY
Qty: 90 CAPSULE | Refills: 9 | Status: SHIPPED | OUTPATIENT
Start: 2023-10-12

## 2023-10-12 RX ORDER — ASPIRIN 81 MG/1
81 TABLET ORAL DAILY
Qty: 360 TABLET | Refills: 9 | Status: SHIPPED | OUTPATIENT
Start: 2023-10-12

## 2023-10-12 RX ORDER — LISINOPRIL 20 MG/1
20 TABLET ORAL NIGHTLY
Qty: 90 TABLET | Refills: 9 | Status: SHIPPED | OUTPATIENT
Start: 2023-10-12

## 2023-10-26 ENCOUNTER — HOSPITAL ENCOUNTER (OUTPATIENT)
Dept: MRI IMAGING | Facility: HOSPITAL | Age: 68
Discharge: HOME | End: 2023-10-26
Attending: INTERNAL MEDICINE

## 2023-10-26 DIAGNOSIS — M54.12 CERVICAL RADICULITIS: ICD-10-CM

## 2023-10-26 PROCEDURE — 72141 MRI NECK SPINE W/O DYE: CPT | Performed by: INTERNAL MEDICINE

## 2024-02-08 ENCOUNTER — LAB ENCOUNTER (OUTPATIENT)
Dept: LAB | Age: 69
End: 2024-02-08
Attending: INTERNAL MEDICINE
Payer: MEDICARE

## 2024-02-08 DIAGNOSIS — Z00.00 ANNUAL PHYSICAL EXAM: Primary | ICD-10-CM

## 2024-02-08 DIAGNOSIS — E11.9 TYPE 2 DIABETES MELLITUS WITHOUT COMPLICATION, WITHOUT LONG-TERM CURRENT USE OF INSULIN (HCC): ICD-10-CM

## 2024-02-08 DIAGNOSIS — Z12.5 ENCOUNTER FOR SCREENING FOR MALIGNANT NEOPLASM OF PROSTATE: ICD-10-CM

## 2024-02-08 DIAGNOSIS — E55.9 VITAMIN D DEFICIENCY: ICD-10-CM

## 2024-02-08 LAB
ALBUMIN SERPL-MCNC: 5.1 G/DL (ref 3.2–4.8)
ALBUMIN/GLOB SERPL: 1.6 {RATIO} (ref 1–2)
ALP LIVER SERPL-CCNC: 79 U/L
ALT SERPL-CCNC: 36 U/L
ANION GAP SERPL CALC-SCNC: 7 MMOL/L (ref 0–18)
AST SERPL-CCNC: 29 U/L (ref ?–34)
BILIRUB SERPL-MCNC: 1.2 MG/DL (ref 0.2–1.1)
BUN BLD-MCNC: 24 MG/DL (ref 9–23)
BUN/CREAT SERPL: 18.5 (ref 10–20)
CALCIUM BLD-MCNC: 10.2 MG/DL (ref 8.7–10.4)
CHLORIDE SERPL-SCNC: 105 MMOL/L (ref 98–112)
CHOLEST SERPL-MCNC: 117 MG/DL (ref ?–200)
CO2 SERPL-SCNC: 26 MMOL/L (ref 21–32)
COMPLEXED PSA SERPL-MCNC: 2.06 NG/ML (ref ?–4)
CREAT BLD-MCNC: 1.3 MG/DL
CREAT UR-SCNC: 76.4 MG/DL
DEPRECATED RDW RBC AUTO: 38.5 FL (ref 35.1–46.3)
EGFRCR SERPLBLD CKD-EPI 2021: 60 ML/MIN/1.73M2 (ref 60–?)
ERYTHROCYTE [DISTWIDTH] IN BLOOD BY AUTOMATED COUNT: 12.7 % (ref 11–15)
EST. AVERAGE GLUCOSE BLD GHB EST-MCNC: 163 MG/DL (ref 68–126)
FASTING PATIENT LIPID ANSWER: NO
FASTING STATUS PATIENT QL REPORTED: NO
GLOBULIN PLAS-MCNC: 3.1 G/DL (ref 2.8–4.4)
GLUCOSE BLD-MCNC: 148 MG/DL (ref 70–99)
HBA1C MFR BLD: 7.3 % (ref ?–5.7)
HCT VFR BLD AUTO: 39.6 %
HDLC SERPL-MCNC: 39 MG/DL (ref 40–59)
HGB BLD-MCNC: 13.7 G/DL
LDLC SERPL CALC-MCNC: 59 MG/DL (ref ?–100)
MCH RBC QN AUTO: 29.2 PG (ref 26–34)
MCHC RBC AUTO-ENTMCNC: 34.6 G/DL (ref 31–37)
MCV RBC AUTO: 84.4 FL
MICROALBUMIN UR-MCNC: <0.3 MG/DL
NONHDLC SERPL-MCNC: 78 MG/DL (ref ?–130)
OSMOLALITY SERPL CALC.SUM OF ELEC: 293 MOSM/KG (ref 275–295)
PLATELET # BLD AUTO: 318 10(3)UL (ref 150–450)
POTASSIUM SERPL-SCNC: 4.8 MMOL/L (ref 3.5–5.1)
PROT SERPL-MCNC: 8.2 G/DL (ref 5.7–8.2)
RBC # BLD AUTO: 4.69 X10(6)UL
SODIUM SERPL-SCNC: 138 MMOL/L (ref 136–145)
TRIGL SERPL-MCNC: 99 MG/DL (ref 30–149)
TSI SER-ACNC: 3.57 MIU/ML (ref 0.55–4.78)
VIT D+METAB SERPL-MCNC: 41.8 NG/ML (ref 30–100)
VLDLC SERPL CALC-MCNC: 15 MG/DL (ref 0–30)
WBC # BLD AUTO: 8.9 X10(3) UL (ref 4–11)

## 2024-02-08 PROCEDURE — 82306 VITAMIN D 25 HYDROXY: CPT | Performed by: INTERNAL MEDICINE

## 2024-02-08 PROCEDURE — 80061 LIPID PANEL: CPT | Performed by: INTERNAL MEDICINE

## 2024-02-08 PROCEDURE — 80053 COMPREHEN METABOLIC PANEL: CPT | Performed by: INTERNAL MEDICINE

## 2024-02-08 PROCEDURE — 84443 ASSAY THYROID STIM HORMONE: CPT | Performed by: INTERNAL MEDICINE

## 2024-02-08 PROCEDURE — 83036 HEMOGLOBIN GLYCOSYLATED A1C: CPT | Performed by: INTERNAL MEDICINE

## 2024-02-08 PROCEDURE — 82570 ASSAY OF URINE CREATININE: CPT

## 2024-02-08 PROCEDURE — 36415 COLL VENOUS BLD VENIPUNCTURE: CPT | Performed by: INTERNAL MEDICINE

## 2024-02-08 PROCEDURE — 85027 COMPLETE CBC AUTOMATED: CPT | Performed by: INTERNAL MEDICINE

## 2024-02-08 PROCEDURE — 82043 UR ALBUMIN QUANTITATIVE: CPT

## 2024-02-26 ENCOUNTER — OFFICE VISIT (OUTPATIENT)
Facility: LOCATION | Age: 69
End: 2024-02-26

## 2024-02-26 VITALS
BODY MASS INDEX: 25.2 KG/M2 | SYSTOLIC BLOOD PRESSURE: 84 MMHG | HEART RATE: 89 BPM | HEIGHT: 71 IN | OXYGEN SATURATION: 97 % | DIASTOLIC BLOOD PRESSURE: 50 MMHG | WEIGHT: 180 LBS

## 2024-02-26 DIAGNOSIS — N13.8 BPH WITH OBSTRUCTION/LOWER URINARY TRACT SYMPTOMS: ICD-10-CM

## 2024-02-26 DIAGNOSIS — E11.9 TYPE 2 DIABETES MELLITUS WITHOUT COMPLICATION, WITHOUT LONG-TERM CURRENT USE OF INSULIN (HCC): ICD-10-CM

## 2024-02-26 DIAGNOSIS — Z00.00 ENCOUNTER FOR MEDICARE ANNUAL WELLNESS EXAM: Primary | ICD-10-CM

## 2024-02-26 DIAGNOSIS — M48.061 FORAMINAL STENOSIS OF LUMBAR REGION: ICD-10-CM

## 2024-02-26 DIAGNOSIS — E11.69 DYSLIPIDEMIA ASSOCIATED WITH TYPE 2 DIABETES MELLITUS (HCC): ICD-10-CM

## 2024-02-26 DIAGNOSIS — G89.29 CHRONIC LEFT SHOULDER PAIN: ICD-10-CM

## 2024-02-26 DIAGNOSIS — M17.11 PRIMARY OSTEOARTHRITIS OF RIGHT KNEE: ICD-10-CM

## 2024-02-26 DIAGNOSIS — M54.12 CERVICAL RADICULITIS: ICD-10-CM

## 2024-02-26 DIAGNOSIS — Z72.0 CHEWING TOBACCO USE: ICD-10-CM

## 2024-02-26 DIAGNOSIS — I15.2 HYPERTENSION ASSOCIATED WITH TYPE 2 DIABETES MELLITUS (HCC): ICD-10-CM

## 2024-02-26 DIAGNOSIS — N40.1 BPH WITH OBSTRUCTION/LOWER URINARY TRACT SYMPTOMS: ICD-10-CM

## 2024-02-26 DIAGNOSIS — R29.898 RIGHT LEG WEAKNESS: ICD-10-CM

## 2024-02-26 DIAGNOSIS — K21.00 GASTROESOPHAGEAL REFLUX DISEASE WITH ESOPHAGITIS, UNSPECIFIED WHETHER HEMORRHAGE: ICD-10-CM

## 2024-02-26 DIAGNOSIS — M25.512 CHRONIC LEFT SHOULDER PAIN: ICD-10-CM

## 2024-02-26 DIAGNOSIS — E11.59 HYPERTENSION ASSOCIATED WITH TYPE 2 DIABETES MELLITUS (HCC): ICD-10-CM

## 2024-02-26 DIAGNOSIS — E78.5 DYSLIPIDEMIA ASSOCIATED WITH TYPE 2 DIABETES MELLITUS (HCC): ICD-10-CM

## 2024-02-26 DIAGNOSIS — M21.861: ICD-10-CM

## 2024-02-26 DIAGNOSIS — Z98.890 HX OF CERVICAL SPINE SURGERY: ICD-10-CM

## 2024-02-26 DIAGNOSIS — Z86.79 HX OF INTRACRANIAL HEMORRHAGE: ICD-10-CM

## 2024-02-26 PROBLEM — M51.37 DDD (DEGENERATIVE DISC DISEASE), LUMBOSACRAL: Status: RESOLVED | Noted: 2019-10-04 | Resolved: 2024-02-26

## 2024-02-26 PROBLEM — M54.41 CHRONIC RIGHT-SIDED LOW BACK PAIN WITH RIGHT-SIDED SCIATICA: Status: RESOLVED | Noted: 2018-11-20 | Resolved: 2024-02-26

## 2024-02-26 PROBLEM — M47.816 FACET SYNDROME, LUMBAR: Status: RESOLVED | Noted: 2019-10-04 | Resolved: 2024-02-26

## 2024-02-26 PROBLEM — M51.379 DDD (DEGENERATIVE DISC DISEASE), LUMBOSACRAL: Status: RESOLVED | Noted: 2019-10-04 | Resolved: 2024-02-26

## 2024-02-26 PROBLEM — M22.2X9 PATELLOFEMORAL PAIN SYNDROME, UNSPECIFIED LATERALITY: Status: RESOLVED | Noted: 2020-01-29 | Resolved: 2024-02-26

## 2024-02-26 PROBLEM — M53.3 SI (SACROILIAC) JOINT DYSFUNCTION: Status: RESOLVED | Noted: 2018-10-23 | Resolved: 2024-02-26

## 2024-02-26 PROBLEM — M25.552 BILATERAL HIP PAIN: Status: RESOLVED | Noted: 2018-08-07 | Resolved: 2024-02-26

## 2024-02-26 PROBLEM — M25.551 BILATERAL HIP PAIN: Status: RESOLVED | Noted: 2018-08-07 | Resolved: 2024-02-26

## 2024-02-26 RX ORDER — PREGABALIN 50 MG/1
50 CAPSULE ORAL 2 TIMES DAILY
Qty: 180 CAPSULE | Refills: 3 | Status: SHIPPED | OUTPATIENT
Start: 2024-02-26

## 2024-02-26 RX ORDER — TAMSULOSIN HYDROCHLORIDE 0.4 MG/1
0.4 CAPSULE ORAL DAILY
Qty: 90 CAPSULE | Refills: 9 | Status: SHIPPED | OUTPATIENT
Start: 2024-02-26

## 2024-02-26 RX ORDER — LISINOPRIL 10 MG/1
10 TABLET ORAL NIGHTLY
Qty: 90 TABLET | Refills: 9 | Status: SHIPPED | OUTPATIENT
Start: 2024-02-26

## 2024-02-26 RX ORDER — ATORVASTATIN CALCIUM 40 MG/1
40 TABLET, FILM COATED ORAL NIGHTLY
Qty: 90 TABLET | Refills: 9 | Status: SHIPPED | OUTPATIENT
Start: 2024-02-26

## 2024-02-26 RX ORDER — ASPIRIN 81 MG/1
81 TABLET ORAL DAILY
Qty: 360 TABLET | Refills: 9 | Status: SHIPPED | OUTPATIENT
Start: 2024-02-26

## 2024-02-26 RX ORDER — PANTOPRAZOLE SODIUM 20 MG/1
20 TABLET, DELAYED RELEASE ORAL
Qty: 90 TABLET | Refills: 3 | Status: SHIPPED | OUTPATIENT
Start: 2024-02-26

## 2024-02-26 NOTE — PATIENT INSTRUCTIONS
Johnny Rosenberg's SCREENING SCHEDULE   Tests on this list are recommended by your physician but may not be covered, or covered at this frequency, by your insurer.   Please check with your insurance carrier before scheduling to verify coverage.   PREVENTATIVE SERVICES FREQUENCY &  COVERAGE DETAILS LAST COMPLETION DATE   Diabetes Screening    Fasting Blood Sugar / Glucose    One screening every 12 months if never tested or if previously tested but not diagnosed with pre-diabetes   One screening every 6 months if diagnosed with pre-diabetes Lab Results   Component Value Date     (H) 02/08/2024        Cardiovascular Disease Screening    Lipid Panel  Cholesterol  Lipoprotein (HDL)  Triglycerides Covered every 5 years for all Medicare beneficiaries without apparent signs or symptoms of cardiovascular disease Lab Results   Component Value Date    CHOLEST 117 02/08/2024    HDL 39 (L) 02/08/2024    LDL 59 02/08/2024    TRIG 99 02/08/2024         Electrocardiogram (EKG)   Covered if needed at Welcome to Medicare, and non-screening if indicated for medical reasons 09/14/2023      Ultrasound Screening for Abdominal Aortic Aneurysm (AAA) Covered once in a lifetime for one of the following risk factors   • Men who are 65-75 years old and have ever smoked   • Anyone with a family history -     Colorectal Cancer Screening  Covered for ages 50-85; only need ONE of the following:    Colonoscopy   Covered every 10 years    Covered every 2 years if patient is at high risk or previous colonoscopy was abnormal 11/04/2020    Health Maintenance   Topic Date Due   • Colorectal Cancer Screening  11/04/2025       Flexible Sigmoidoscopy   Covered every 4 years -    Fecal Occult Blood Test Covered annually -   Prostate Cancer Screening    Prostate-Specific Antigen (PSA) Annually Lab Results   Component Value Date    PSA 1.0 11/27/2017     Health Maintenance   Topic Date Due   • PSA  02/08/2026      Immunizations    Influenza Covered  once per flu season  Please get every year 09/14/2023  No recommendations at this time    Pneumococcal Each vaccine (Nmpjkri95 & Cuvorfurp61) covered once after 65 Prevnar 13: -    [id number]: 07/16/2021     Pneumococcal Vaccination(2 of 2 - PCV) due on 07/16/2022    Hepatitis B One screening covered for patients with certain risk factors   -  No recommendations at this time    Tetanus Toxoid Not covered by Medicare Part B unless medically necessary (cut with metal); may be covered with your pharmacy prescription benefits -    Tetanus, Diptheria and Pertusis TD and TDaP Not covered by Medicare Part B -  No recommendations at this time    Zoster Not covered by Medicare Part B; may be covered with your pharmacy  prescription benefits -  No recommendations at this time     Diabetes      Hemoglobin A1C Annually; if last result is elevated, may be asked to retest more frequently.    Medicare covers every 3 months Lab Results   Component Value Date     (H) 02/08/2024    A1C 7.3 (H) 02/08/2024       No recommendations at this time    Creat/alb ratio Annually Lab Results   Component Value Date    MICROALBCREA  02/08/2024      Comment:      Unable to calculate due to Urine Microalbumin <0.3 mg/dL           LDL Annually Lab Results   Component Value Date    LDL 59 02/08/2024       Dilated Eye Exam Annually Last Diabetic Eye Exam:  No data recorded  No data recorded       Annual Monitoring of Persistent Medications (ACE/ARB, digoxin diuretics, anticonvulsants)    Potassium Annually Lab Results   Component Value Date    K 4.8 02/08/2024         Creatinine   Annually Lab Results   Component Value Date    CREATSERUM 1.30 02/08/2024         BUN Annually Lab Results   Component Value Date    BUN 24 (H) 02/08/2024       Drug Serum Conc Annually No results found for: \"DIGOXIN\", \"DIG\", \"VALP\"

## 2024-02-26 NOTE — PROGRESS NOTES
Subjective:   Johnny Rosenberg is a 68 year old male who presents for a MA (Medicare Advantage) Supervisit (Once per calendar year).     His blood pressure has been low, he has been feeling very dizzy and lightheadedv, he did almost faint coming out of the shower.  He has been on lisinopril.  We recently started him on Lyrica to help with his neuropathic pain and he has noted significant improvement, suspect this may be also producing blood pressure reductions.      History/Other:   Fall Risk Assessment:   He has been screened for Falls and is low risk.      Cognitive Assessment:   He had a completely normal cognitive assessment - see flowsheet entries     Functional Ability/Status:   Johnny Rosenberg has some abnormal functions as listed below:  He has difficulties Affording Meds based on screening of functional status.       Depression Screening (PHQ-2/PHQ-9): PHQ-2 SCORE: 0  , done 2/26/2024   If you checked off any problems, how difficult have these problems made it for you to do your work, take care of things at home, or get along with other people?: Not difficult at all    Last [place] Suicide Screening on 2/26/2024 was No Risk.          Advanced Directives:   He does NOT have a Living Will. [Do you have a living will?: No]  He does NOT have a Power of  for Health Care. [Do you have a healthcare power of ?: No]  Discussed Advance Care Planning with patient (and family/surrogate if present). Standard forms made available to patient in After Visit Summary.      Patient Active Problem List   Diagnosis    Type 2 diabetes mellitus without complication, without long-term current use of insulin (HCC)    Hx of intracranial hemorrhage    Hx of cervical spine surgery    Chronic left shoulder pain    Primary osteoarthritis of right knee    Foraminal stenosis of lumbar region    Right leg weakness    Dyslipidemia associated with type 2 diabetes mellitus (HCC)    Genu recurvatum, acquired, right     Hypertension associated with type 2 diabetes mellitus (HCC)    BPH with obstruction/lower urinary tract symptoms    Chewing tobacco use     Allergies:  He has No Known Allergies.    Current Medications:  Outpatient Medications Marked as Taking for the 2/26/24 encounter (Office Visit) with Pop Giles MD   Medication Sig    lisinopril 10 MG Oral Tab Take 1 tablet (10 mg total) by mouth nightly. FOR BLOOD PRESSURE. RESUME WHEN HOME BLOOD PRESSURE REACHES 130/80s    aspirin (ASPIRIN EC LOW DOSE) 81 MG Oral Tab EC Take 1 tablet (81 mg total) by mouth daily.    atorvastatin 40 MG Oral Tab Take 1 tablet (40 mg total) by mouth nightly. FOR CHOLESTEROL.    Empagliflozin 25 MG Oral Tab Take 25 mg by mouth daily. FOR DIABETES.    metFORMIN HCl 1000 MG Oral Tab Take 1 tablet (1,000 mg total) by mouth 2 (two) times daily with meals. FOR DIABETES.    pantoprazole 20 MG Oral Tab EC Take 1 tablet (20 mg total) by mouth every morning before breakfast.    pregabalin (LYRICA) 50 MG Oral Cap Take 1 capsule (50 mg total) by mouth 2 (two) times daily. FOR NERVE PAIN.    tamsulosin 0.4 MG Oral Cap Take 1 capsule (0.4 mg total) by mouth daily. FOR PROSTATE.    clotrimazole-betamethasone 1-0.05 % External Cream Apply 1 Application topically 2 (two) times daily.       Medical History:  He  has a past medical history of Diabetes (HCC), Intracranial hemorrhage following injury (HCC) (12/4/2015), Other and unspecified hyperlipidemia, and Serrated adenoma of colon (11/04/2020).  Surgical History:  He  has a past surgical history that includes other surgical history; excis lumbar disk,one level; colonoscopy; colonoscopy (11/04/2020); and cataract (Left, 01/06/2022).   Family History:  His family history includes Colon Cancer in his father.  Social History:  He  reports that he has quit smoking. His smoking use included cigarettes. He has been exposed to tobacco smoke. He has never used smokeless tobacco. He reports current alcohol use. He  reports that he does not use drugs.    Tobacco:  He smoked tobacco in the past but quit greater than 12 months ago.  Social History    Tobacco Use      Smoking status: Former        Packs/day: 0        Types: Cigarettes        Passive exposure: Past      Smokeless tobacco: Never         CAGE Alcohol Screen:   CAGE screening score of 0 on 2/26/2024, showing low risk of alcohol abuse.      Patient Care Team:  Pop Giles MD as PCP - General (Internal Medicine)  Nabil Valentino MD (Family Practice)  Narciso Stephen MD (NEUROLOGY)  Narciso Stephen MD (NEUROLOGY)  Behar, Alex, MD (Physical Medicine)    Review of Systems   Constitutional:  Negative for unexpected weight change.   HENT:  Negative for hearing loss.    Eyes:  Negative for pain and visual disturbance.   Respiratory:  Negative for shortness of breath.    Cardiovascular:  Negative for chest pain, palpitations and leg swelling.   Gastrointestinal:  Negative for abdominal pain and blood in stool.   Genitourinary:  Negative for difficulty urinating and hematuria.   Neurological:  Negative for tremors and syncope.   Psychiatric/Behavioral: Negative.           Objective:   Physical Exam  Vitals and nursing note reviewed.   Constitutional:       General: He is not in acute distress.     Appearance: Normal appearance.   HENT:      Head: Normocephalic.      Right Ear: External ear normal.      Left Ear: External ear normal.   Eyes:      Extraocular Movements: Extraocular movements intact.      Conjunctiva/sclera: Conjunctivae normal.   Cardiovascular:      Rate and Rhythm: Normal rate and regular rhythm.      Pulses: Normal pulses.      Heart sounds: Normal heart sounds.   Pulmonary:      Effort: Pulmonary effort is normal. No respiratory distress.      Breath sounds: Normal breath sounds. No wheezing.   Abdominal:      General: Abdomen is flat. Bowel sounds are normal.      Tenderness: There is no abdominal tenderness.   Musculoskeletal:         General:  Normal range of motion.      Cervical back: Normal range of motion and neck supple.   Skin:     Coloration: Skin is not jaundiced.   Neurological:      General: No focal deficit present.      Mental Status: He is alert and oriented to person, place, and time. Mental status is at baseline.   Psychiatric:         Mood and Affect: Mood normal.         Behavior: Behavior normal.         BP (!) 84/50   Pulse 89   Ht 5' 11\" (1.803 m)   Wt 180 lb (81.6 kg)   SpO2 97%   BMI 25.10 kg/m²  Estimated body mass index is 25.1 kg/m² as calculated from the following:    Height as of this encounter: 5' 11\" (1.803 m).    Weight as of this encounter: 180 lb (81.6 kg).    Medicare Hearing Assessment:   Hearing Screening    Time taken: 2/26/2024 11:08 AM  Screening Method: Finger Rub  Finger Rub Result: Pass               Visual Acuity:   Right Eye Visual Acuity: Corrected Right Eye Chart Acuity: 20/25   Left Eye Visual Acuity: Corrected Left Eye Chart Acuity: 20/25   Both Eyes Visual Acuity: Corrected Both Eyes Chart Acuity: 20/25   Able To Tolerate Visual Acuity: Yes        Assessment & Plan:   Johnny Rosenberg is a 68 year old male who presents for a Medicare Assessment.     1. Encounter for Medicare annual wellness exam (Primary)  Plan  Overall doing well today.  Labs overall stable.    2. Dyslipidemia associated with type 2 diabetes mellitus (HCC)  -     Aspirin; Take 1 tablet (81 mg total) by mouth daily.  Dispense: 360 tablet; Refill: 9  -     Atorvastatin Calcium; Take 1 tablet (40 mg total) by mouth nightly. FOR CHOLESTEROL.  Dispense: 90 tablet; Refill: 9  Plan  Chronic, well controlled on current medications as noted in medications, denies major adverse effects, continue with present management, continue to monitor labs.     3. Hypertension associated with type 2 diabetes mellitus (HCC)  -     Lisinopril; Take 1 tablet (10 mg total) by mouth nightly. FOR BLOOD PRESSURE. RESUME WHEN HOME BLOOD PRESSURE REACHES 130/80s   Dispense: 90 tablet; Refill: 9  Plan  Chronic, blood pressure is actually low and he is symptomatic, we will reduce lisinopril from 20 mg down to 10 mg however I have asked him to stop the lisinopril until blood pressure reaches 130/80 and then we may resume.     4. Type 2 diabetes mellitus without complication, without long-term current use of insulin (HCC)  -     Lisinopril; Take 1 tablet (10 mg total) by mouth nightly. FOR BLOOD PRESSURE. RESUME WHEN HOME BLOOD PRESSURE REACHES 130/80s  Dispense: 90 tablet; Refill: 9  -     Atorvastatin Calcium; Take 1 tablet (40 mg total) by mouth nightly. FOR CHOLESTEROL.  Dispense: 90 tablet; Refill: 9  -     Empagliflozin; Take 25 mg by mouth daily. FOR DIABETES.  Dispense: 90 tablet; Refill: 9  -     metFORMIN HCl; Take 1 tablet (1,000 mg total) by mouth 2 (two) times daily with meals. FOR DIABETES.  Dispense: 180 tablet; Refill: 9  -     Hemoglobin A1C; Future; Expected date: 02/26/2024  Plan  A1c slightly worsened, we discussed GLP-1 agonist, he declines, advised to increase exercise and try to remove some carbs from the diet, recheck in 3 months.    5. Hx of intracranial hemorrhage  Plan  As above    6. Gastroesophageal reflux disease with esophagitis, unspecified whether hemorrhage  -     Pantoprazole Sodium; Take 1 tablet (20 mg total) by mouth every morning before breakfast.  Dispense: 90 tablet; Refill: 3  Plan  Chronic, well controlled on current medications as noted in medications, denies major adverse effects, continue with present management, continue to monitor labs.     7. Cervical radiculitis  -     Pregabalin; Take 1 capsule (50 mg total) by mouth 2 (two) times daily. FOR NERVE PAIN.  Dispense: 180 capsule; Refill: 3  Plan  Chronic, well controlled on current medications as noted in medications, denies major adverse effects, continue with present management, continue to monitor labs.     8. BPH with obstruction/lower urinary tract symptoms  -     Tamsulosin  HCl; Take 1 capsule (0.4 mg total) by mouth daily. FOR PROSTATE.  Dispense: 90 capsule; Refill: 9  Plan  Chronic, well controlled on current medications as noted in medications, denies major adverse effects, continue with present management, continue to monitor labs.     9. Foraminal stenosis of lumbar region  Plan  Chronic, well controlled on current medications as noted in medications, denies major adverse effects, continue with present management, continue to monitor labs.     10. Right leg weakness  Plan  Stable, chronic, no complaints.     11. Primary osteoarthritis of right knee  Plan  Stable, chronic, no complaints.     12. Hx of cervical spine surgery  Overview:  In 2016, spinal fusion surgery.  Dr Kennedy.  Plan  Chronic, well controlled on current medications as noted in medications, denies major adverse effects, continue with present management, continue to monitor labs.     13. Genu recurvatum, acquired, right  Plan  Stable, chronic, no complaints.   14. Chronic left shoulder pain  Plan  Stable, chronic, no complaints.     15. Chewing tobacco use  Plan  Stable, chronic, no complaints.   The patient indicates understanding of these issues and agrees to the plan.  Continue with current treatment plan.  Patient reassured.  Reinforced healthy diet, lifestyle, and exercise.      Return in about 4 weeks (around 3/25/2024) for BLOOD PRESSURE- update me in 2-3 weeks; 3 months diabetes recheck- will call you for follow up. .     Pop Giles MD, 2/26/2024     Supplementary Documentation:   General Health:  In the past six months, have you lost more than 10 pounds without trying?: 2 - No  Has your appetite been poor?: No  Type of Diet: Balanced  How does the patient maintain a good energy level?: Appropriate Exercise  How would you describe your daily physical activity?: Moderate  How would you describe your current health state?: Good  How do you maintain positive mental well-being?: Social Interaction;Visiting  Friends  On a scale of 0 to 10, with 0 being no pain and 10 being severe pain, what is your pain level?: 0 - (None)  In the past six months, have you experienced urine leakage?: 0-No  At any time do you feel concerned for the safety/well-being of yourself and/or your children, in your home or elsewhere?: No  Have you had any immunizations at another office such as Influenza, Hepatitis B, Tetanus, or Pneumococcal?: Annmarie Rosenberg's SCREENING SCHEDULE   Tests on this list are recommended by your physician but may not be covered, or covered at this frequency, by your insurer.   Please check with your insurance carrier before scheduling to verify coverage.   PREVENTATIVE SERVICES FREQUENCY &  COVERAGE DETAILS LAST COMPLETION DATE   Diabetes Screening    Fasting Blood Sugar / Glucose    One screening every 12 months if never tested or if previously tested but not diagnosed with pre-diabetes   One screening every 6 months if diagnosed with pre-diabetes Lab Results   Component Value Date     (H) 02/08/2024        Cardiovascular Disease Screening    Lipid Panel  Cholesterol  Lipoprotein (HDL)  Triglycerides Covered every 5 years for all Medicare beneficiaries without apparent signs or symptoms of cardiovascular disease Lab Results   Component Value Date    CHOLEST 117 02/08/2024    HDL 39 (L) 02/08/2024    LDL 59 02/08/2024    TRIG 99 02/08/2024         Electrocardiogram (EKG)   Covered if needed at Welcome to Medicare, and non-screening if indicated for medical reasons 09/14/2023      Ultrasound Screening for Abdominal Aortic Aneurysm (AAA) Covered once in a lifetime for one of the following risk factors    Men who are 65-75 years old and have ever smoked    Anyone with a family history -     Colorectal Cancer Screening  Covered for ages 50-85; only need ONE of the following:    Colonoscopy   Covered every 10 years    Covered every 2 years if patient is at high risk or previous colonoscopy was  abnormal 11/04/2020    Health Maintenance   Topic Date Due    Colorectal Cancer Screening  11/04/2025       Flexible Sigmoidoscopy   Covered every 4 years -    Fecal Occult Blood Test Covered annually -   Prostate Cancer Screening    Prostate-Specific Antigen (PSA) Annually Lab Results   Component Value Date    PSA 1.0 11/27/2017     Health Maintenance   Topic Date Due    PSA  02/08/2026      Immunizations    Influenza Covered once per flu season  Please get every year 09/14/2023  No recommendations at this time    Pneumococcal Each vaccine ([id number] & [id number]) covered once after 65 Prevnar 13: -    [id number]: 07/16/2021     Pneumococcal Vaccination(2 of 2 - PCV) due on 07/16/2022    Hepatitis B One screening covered for patients with certain risk factors   -  No recommendations at this time    Tetanus Toxoid Not covered by Medicare Part B unless medically necessary (cut with metal); may be covered with your pharmacy prescription benefits -    Tetanus, Diptheria and Pertusis TD and TDaP Not covered by Medicare Part B -  No recommendations at this time    Zoster Not covered by Medicare Part B; may be covered with your pharmacy  prescription benefits -  No recommendations at this time     Diabetes      Hemoglobin A1C Annually; if last result is elevated, may be asked to retest more frequently.    Medicare covers every 3 months Lab Results   Component Value Date     (H) 02/08/2024    A1C 7.3 (H) 02/08/2024       No recommendations at this time    Creat/alb ratio Annually Lab Results   Component Value Date    MICROALBCREA  02/08/2024      Comment:      Unable to calculate due to Urine Microalbumin <0.3 mg/dL           LDL Annually Lab Results   Component Value Date    LDL 59 02/08/2024       Dilated Eye Exam Annually Last Diabetic Eye Exam:  No data recorded  No data recorded       Annual Monitoring of Persistent Medications (ACE/ARB, digoxin diuretics, anticonvulsants)    Potassium Annually Lab Results    Component Value Date    K 4.8 02/08/2024         Creatinine   Annually Lab Results   Component Value Date    CREATSERUM 1.30 02/08/2024         BUN Annually Lab Results   Component Value Date    BUN 24 (H) 02/08/2024       Drug Serum Conc Annually No results found for: \"DIGOXIN\", \"DIG\", \"VALP\"

## 2024-05-31 NOTE — PROGRESS NOTES
Diagnosis: Lumbar radiculopathy (M54.16)         Next MD visit: 5/1/19  Fall Risk: standard         Precautions: n/a          Medication Changes since last visit?: No    Subjective: Pt reports right sided buttock/hip pain and right calf pain rated as 4/10 bridging 3 x 10   - supine R SAQs with 5# 10\" holds x 10 reps   - R/L [place] curl up 5\" holds 1 x 12 each  - standing R hip extension 3 x 10   - R L/E shuttle knee extension 4 bands 3 x 10 (setting 5)  - bridges with RTB above knees 10\" holds 2 x 10   - with 3# @ ankle  - R/L [place] curl up 2 x 10 each   - R L/E shuttle knee extension 3 bands 2 x 10 (setting 5)   - standing R hip extension 1 x 12  - bridges with RTB above knees 10\" holds 1 x 10   - supine SB bridging 2 x 10  - R single leg bridge 1 x 10 10 reps with 1.5# @ ankle  - R/L [place] curl up 1 x 15 each  - hooklying anterior/posterior pelvic tilt 1 x 10 each    - prone glute sets 5\" holds x 30  - prone lumbar PA mobs grade 2 x 5 minutes   - PPU 1 x 10        - standing R hip extension trunk flex Charges: Ex 3     Total Timed Treatment: 50 min  Total Treatment Time: 50 min 25

## 2024-07-17 NOTE — TELEPHONE ENCOUNTER
Dr Britney Mcdonnell: please review. Patient was seen by Dr. Terese Wilkins today. Will soon be scheduling Epidural steroid injection. Patient will need specific instructions when and how long metformin should be stopped. Prior and after procedure.      Please also review Last office visit 8/28/2023       Next office visit scheduled Visit date not found    Requested Prescriptions     Pending Prescriptions Disp Refills    estradiol (ESTRACE) 1 MG tablet [Pharmacy Med Name: ESTRADIOL 1 MG TABLET] 30 tablet 11     Sig: TAKE 1 TABLET BY MOUTH DAILY

## 2024-07-27 ENCOUNTER — LAB ENCOUNTER (OUTPATIENT)
Dept: LAB | Age: 69
End: 2024-07-27
Attending: INTERNAL MEDICINE
Payer: MEDICARE

## 2024-07-27 DIAGNOSIS — Z00.00 ENCOUNTER FOR MEDICARE ANNUAL WELLNESS EXAM: ICD-10-CM

## 2024-07-27 DIAGNOSIS — E11.9 TYPE 2 DIABETES MELLITUS WITHOUT COMPLICATION, WITHOUT LONG-TERM CURRENT USE OF INSULIN (HCC): ICD-10-CM

## 2024-07-27 LAB
ALBUMIN SERPL-MCNC: 4.9 G/DL (ref 3.2–4.8)
ALBUMIN/GLOB SERPL: 1.6 {RATIO} (ref 1–2)
ALP LIVER SERPL-CCNC: 85 U/L
ALT SERPL-CCNC: 38 U/L
ANION GAP SERPL CALC-SCNC: 5 MMOL/L (ref 0–18)
AST SERPL-CCNC: 29 U/L (ref ?–34)
BILIRUB SERPL-MCNC: 1.3 MG/DL (ref 0.2–1.1)
BUN BLD-MCNC: 24 MG/DL (ref 9–23)
CALCIUM BLD-MCNC: 9.7 MG/DL (ref 8.7–10.4)
CHLORIDE SERPL-SCNC: 104 MMOL/L (ref 98–112)
CHOLEST SERPL-MCNC: 125 MG/DL (ref ?–200)
CO2 SERPL-SCNC: 27 MMOL/L (ref 21–32)
CREAT BLD-MCNC: 1.31 MG/DL
EGFRCR SERPLBLD CKD-EPI 2021: 59 ML/MIN/1.73M2 (ref 60–?)
EST. AVERAGE GLUCOSE BLD GHB EST-MCNC: 169 MG/DL (ref 68–126)
FASTING PATIENT LIPID ANSWER: YES
FASTING STATUS PATIENT QL REPORTED: YES
GLOBULIN PLAS-MCNC: 3 G/DL (ref 2–3.5)
GLUCOSE BLD-MCNC: 151 MG/DL (ref 70–99)
HBA1C MFR BLD: 7.5 % (ref ?–5.7)
HDLC SERPL-MCNC: 42 MG/DL (ref 40–59)
LDLC SERPL CALC-MCNC: 64 MG/DL (ref ?–100)
NONHDLC SERPL-MCNC: 83 MG/DL (ref ?–130)
OSMOLALITY SERPL CALC.SUM OF ELEC: 289 MOSM/KG (ref 275–295)
POTASSIUM SERPL-SCNC: 4.4 MMOL/L (ref 3.5–5.1)
PROT SERPL-MCNC: 7.9 G/DL (ref 5.7–8.2)
SODIUM SERPL-SCNC: 136 MMOL/L (ref 136–145)
TRIGL SERPL-MCNC: 103 MG/DL (ref 30–149)
VLDLC SERPL CALC-MCNC: 15 MG/DL (ref 0–30)

## 2024-07-27 PROCEDURE — 36415 COLL VENOUS BLD VENIPUNCTURE: CPT | Performed by: INTERNAL MEDICINE

## 2024-07-27 PROCEDURE — 80061 LIPID PANEL: CPT | Performed by: INTERNAL MEDICINE

## 2024-07-27 PROCEDURE — 83036 HEMOGLOBIN GLYCOSYLATED A1C: CPT | Performed by: INTERNAL MEDICINE

## 2024-07-27 PROCEDURE — 80053 COMPREHEN METABOLIC PANEL: CPT | Performed by: INTERNAL MEDICINE

## 2024-07-29 ENCOUNTER — OFFICE VISIT (OUTPATIENT)
Facility: LOCATION | Age: 69
End: 2024-07-29

## 2024-07-29 VITALS
OXYGEN SATURATION: 98 % | SYSTOLIC BLOOD PRESSURE: 136 MMHG | BODY MASS INDEX: 25.2 KG/M2 | HEIGHT: 71 IN | WEIGHT: 180 LBS | HEART RATE: 91 BPM | DIASTOLIC BLOOD PRESSURE: 70 MMHG

## 2024-07-29 DIAGNOSIS — E78.5 DYSLIPIDEMIA ASSOCIATED WITH TYPE 2 DIABETES MELLITUS (HCC): ICD-10-CM

## 2024-07-29 DIAGNOSIS — H60.542 DERMATITIS OF LEFT EAR CANAL: Primary | ICD-10-CM

## 2024-07-29 DIAGNOSIS — E11.69 DYSLIPIDEMIA ASSOCIATED WITH TYPE 2 DIABETES MELLITUS (HCC): ICD-10-CM

## 2024-07-29 DIAGNOSIS — N18.31 STAGE 3A CHRONIC KIDNEY DISEASE (HCC): Chronic | ICD-10-CM

## 2024-07-29 DIAGNOSIS — H92.02 LEFT EAR PAIN: ICD-10-CM

## 2024-07-29 DIAGNOSIS — N18.31 TYPE 2 DIABETES MELLITUS WITH STAGE 3A CHRONIC KIDNEY DISEASE, WITHOUT LONG-TERM CURRENT USE OF INSULIN (HCC): ICD-10-CM

## 2024-07-29 DIAGNOSIS — I15.2 HYPERTENSION ASSOCIATED WITH TYPE 2 DIABETES MELLITUS (HCC): ICD-10-CM

## 2024-07-29 DIAGNOSIS — E11.59 HYPERTENSION ASSOCIATED WITH TYPE 2 DIABETES MELLITUS (HCC): ICD-10-CM

## 2024-07-29 DIAGNOSIS — E11.22 TYPE 2 DIABETES MELLITUS WITH STAGE 3A CHRONIC KIDNEY DISEASE, WITHOUT LONG-TERM CURRENT USE OF INSULIN (HCC): ICD-10-CM

## 2024-07-29 PROBLEM — N18.30 CKD (CHRONIC KIDNEY DISEASE) STAGE 3, GFR 30-59 ML/MIN (HCC): Chronic | Status: ACTIVE | Noted: 2024-07-29

## 2024-07-29 PROCEDURE — 1159F MED LIST DOCD IN RCRD: CPT | Performed by: INTERNAL MEDICINE

## 2024-07-29 PROCEDURE — 3008F BODY MASS INDEX DOCD: CPT | Performed by: INTERNAL MEDICINE

## 2024-07-29 PROCEDURE — 3051F HG A1C>EQUAL 7.0%<8.0%: CPT | Performed by: INTERNAL MEDICINE

## 2024-07-29 PROCEDURE — 3078F DIAST BP <80 MM HG: CPT | Performed by: INTERNAL MEDICINE

## 2024-07-29 PROCEDURE — 1160F RVW MEDS BY RX/DR IN RCRD: CPT | Performed by: INTERNAL MEDICINE

## 2024-07-29 PROCEDURE — 99214 OFFICE O/P EST MOD 30 MIN: CPT | Performed by: INTERNAL MEDICINE

## 2024-07-29 PROCEDURE — 3075F SYST BP GE 130 - 139MM HG: CPT | Performed by: INTERNAL MEDICINE

## 2024-07-29 RX ORDER — ATORVASTATIN CALCIUM 40 MG/1
40 TABLET, FILM COATED ORAL NIGHTLY
Qty: 90 TABLET | Refills: 9 | Status: SHIPPED | OUTPATIENT
Start: 2024-07-29

## 2024-07-29 RX ORDER — LISINOPRIL 10 MG/1
10 TABLET ORAL NIGHTLY
Qty: 90 TABLET | Refills: 9 | Status: SHIPPED | OUTPATIENT
Start: 2024-07-29

## 2024-07-29 RX ORDER — HYDROCORTISONE AND ACETIC ACID 20.75; 10.375 MG/ML; MG/ML
3 SOLUTION AURICULAR (OTIC) 2 TIMES DAILY PRN
Qty: 10 ML | Refills: 2 | Status: SHIPPED | OUTPATIENT
Start: 2024-07-29

## 2024-07-29 RX ORDER — TRIAMCINOLONE ACETONIDE 1 MG/G
CREAM TOPICAL 2 TIMES DAILY PRN
Qty: 45 G | Refills: 3 | Status: SHIPPED | OUTPATIENT
Start: 2024-07-29

## 2024-07-29 RX ORDER — AMOXICILLIN AND CLAVULANATE POTASSIUM 875; 125 MG/1; MG/1
1 TABLET, FILM COATED ORAL 2 TIMES DAILY
Qty: 20 TABLET | Refills: 0 | Status: SHIPPED | OUTPATIENT
Start: 2024-07-29 | End: 2024-08-08

## 2024-07-29 NOTE — PROGRESS NOTES
INTERNAL MEDICINE OFFICE NOTE     Patient ID: Johnny Rosenberg is a 69 year old male.  Today's Date: 07/29/24  Chief Complaint: Ear Pain (Patient here for left ear pain and states that it started 2-3 weeks ago)    Ear Pain       1.  He has type 2 diabetes, his A1c is stable at 7.5, he is currently on Jardiance and metformin, continue with diet and exercise, no complaints.  2.  He is hypertension associate with type 2 diabetes, blood pressure well-controlled on lisinopril, no complaints.    3.  He is dyslipidemia associated type 2 diabetes, LDL is well-controlled on atorvastatin 40, no complaints.    4.  He is complaining of left ear itching, this is both internal and external, there is no pain, he does use Q-tips.  No discharge.  No hearing loss.        Vitals:    07/29/24 0848 07/29/24 0912   BP: 146/72 136/70   Pulse: 91    SpO2: 98%    Weight: 180 lb (81.6 kg)    Height: 5' 11\" (1.803 m)      body mass index is 25.1 kg/m².  BP Readings from Last 3 Encounters:   07/29/24 136/70   02/26/24 (!) 84/50   10/12/23 128/68     The ASCVD Risk score (Manuel DK, et al., 2019) failed to calculate for the following reasons:    The valid total cholesterol range is 130 to 320 mg/dL  Medications reviewed:  Current Outpatient Medications   Medication Sig Dispense Refill    Hydrocortisone-Acetic Acid 1-2 % Otic Solution Place 3 drops into the left ear 2 (two) times daily as needed (ear itching). 10 mL 2    triamcinolone 0.1 % External Cream Apply topically 2 (two) times daily as needed. Apply 1 \"FINGER TIP UNIT\" 1-2 times daily as needed to affected areas for up to 14 days. Do not use on face, in mouth, or under skin folds. 45 g 3    amoxicillin clavulanate 875-125 MG Oral Tab Take 1 tablet by mouth 2 (two) times daily for 10 days. ONLY START IF YOU DEVELOP EAR PAIN. 20 tablet 0    atorvastatin 40 MG Oral Tab Take 1 tablet (40 mg total) by mouth nightly. FOR CHOLESTEROL. 90 tablet 9    lisinopril 10 MG Oral Tab  Take 1 tablet (10 mg total) by mouth nightly. FOR BLOOD PRESSURE. 90 tablet 9    Empagliflozin 25 MG Oral Tab Take 25 mg by mouth daily. FOR DIABETES. 90 tablet 9    metFORMIN HCl 1000 MG Oral Tab Take 1 tablet (1,000 mg total) by mouth 2 (two) times daily with meals. FOR DIABETES. 180 tablet 9    aspirin (ASPIRIN EC LOW DOSE) 81 MG Oral Tab EC Take 1 tablet (81 mg total) by mouth daily. 360 tablet 9    pantoprazole 20 MG Oral Tab EC Take 1 tablet (20 mg total) by mouth every morning before breakfast. 90 tablet 3    pregabalin (LYRICA) 50 MG Oral Cap Take 1 capsule (50 mg total) by mouth 2 (two) times daily. FOR NERVE PAIN. 180 capsule 3    tamsulosin 0.4 MG Oral Cap Take 1 capsule (0.4 mg total) by mouth daily. FOR PROSTATE. 90 capsule 9    clotrimazole-betamethasone 1-0.05 % External Cream Apply 1 Application topically 2 (two) times daily. 45 g 1         Assessment & Plan    1. Dermatitis of left ear canal (Primary)  -     Hydrocortisone-Acetic Acid; Place 3 drops into the left ear 2 (two) times daily as needed (ear itching).  Dispense: 10 mL; Refill: 2  -     Triamcinolone Acetonide; Apply topically 2 (two) times daily as needed. Apply 1 \"FINGER TIP UNIT\" 1-2 times daily as needed to affected areas for up to 14 days. Do not use on face, in mouth, or under skin folds.  Dispense: 45 g; Refill: 3  2. Left ear pain  -     Amoxicillin-Pot Clavulanate; Take 1 tablet by mouth 2 (two) times daily for 10 days. ONLY START IF YOU DEVELOP EAR PAIN.  Dispense: 20 tablet; Refill: 0  3. Hypertension associated with type 2 diabetes mellitus (HCC)  -     Lisinopril; Take 1 tablet (10 mg total) by mouth nightly. FOR BLOOD PRESSURE.  Dispense: 90 tablet; Refill: 9  4. Dyslipidemia associated with type 2 diabetes mellitus (HCC)  -     Atorvastatin Calcium; Take 1 tablet (40 mg total) by mouth nightly. FOR CHOLESTEROL.  Dispense: 90 tablet; Refill: 9  5. Stage 3a chronic kidney disease (HCC)  -     Empagliflozin; Take 25 mg by mouth  daily. FOR DIABETES.  Dispense: 90 tablet; Refill: 9  6. Type 2 diabetes mellitus with stage 3a chronic kidney disease, without long-term current use of insulin ([facility])  -     Comp Metabolic Panel (14); Future; Expected date: 10/27/2024  -     Hemoglobin A1C; Future; Expected date: 10/27/2024  -     Atorvastatin Calcium; Take 1 tablet (40 mg total) by mouth nightly. FOR CHOLESTEROL.  Dispense: 90 tablet; Refill: 9  -     Lisinopril; Take 1 tablet (10 mg total) by mouth nightly. FOR BLOOD PRESSURE.  Dispense: 90 tablet; Refill: 9  -     Empagliflozin; Take 25 mg by mouth daily. FOR DIABETES.  Dispense: 90 tablet; Refill: 9  -     metFORMIN HCl; Take 1 tablet (1,000 mg total) by mouth 2 (two) times daily with meals. FOR DIABETES.  Dispense: 180 tablet; Refill: 9  Plan  Chronic conditions are well-controlled, we will continue with current medications.  For the dermatitis trial of topical hydrocortisone/triamcinolone, if he develops ear pain then use the Augmentin but low suspicion this will occur.      Follow Up: Return in about 3 months (around 10/29/2024) for DIABETES, POC A1C IN OFFICE, ARRIVE 15 MIN EARLY..         Objective: Results:   Physical Exam  Vitals and nursing note reviewed.   Constitutional:       General: He is not in acute distress.     Appearance: Normal appearance.   HENT:      Head: Normocephalic.      Right Ear: External ear normal. No decreased hearing noted. No middle ear effusion. No mastoid tenderness. Tympanic membrane is not injected or erythematous.      Left Ear: External ear normal. No decreased hearing noted.  No middle ear effusion. No mastoid tenderness. Tympanic membrane is not injected or erythematous.      Ears:      Comments: Irritation of canal, minimal erythema  Eyes:      Extraocular Movements: Extraocular movements intact.      Conjunctiva/sclera: Conjunctivae normal.   Pulmonary:      Effort: Pulmonary effort is normal.   Musculoskeletal:         General: Normal range of motion.       Cervical back: Normal range of motion and neck supple.   Skin:     Coloration: Skin is not jaundiced.   Neurological:      General: No focal deficit present.      Mental Status: He is alert and oriented to person, place, and time. Mental status is at baseline.   Psychiatric:         Mood and Affect: Mood normal.         Behavior: Behavior normal.        Reviewed:    Patient Active Problem List    Diagnosis    CKD (chronic kidney disease) stage 3, GFR 30-59 ml/min ([facility])    Chewing tobacco use    BPH with obstruction/lower urinary tract symptoms    Hypertension associated with type 2 diabetes mellitus ([facility])    Genu recurvatum, acquired, right    Dyslipidemia associated with type 2 diabetes mellitus ([facility])    Right leg weakness    Foraminal stenosis of lumbar region    Primary osteoarthritis of right knee    Chronic left shoulder pain    Hx of intracranial hemorrhage    Hx of cervical spine surgery     In 2016, spinal fusion surgery.  Dr Kennedy.        Type 2 diabetes mellitus with stage 3a chronic kidney disease, without long-term current use of insulin ([facility])      No Known Allergies     Social History     Socioeconomic History    Marital status:    Tobacco Use    Smoking status: Former     Types: Cigarettes     Passive exposure: Past    Smokeless tobacco: Never   Vaping Use    Vaping status: Never Used   Substance and Sexual Activity    Alcohol use: Yes     Alcohol/week: 0.0 standard drinks of alcohol     Comment: 1 drink per month    Drug use: No   Other Topics Concern    Pt has a pacemaker No    Pt has a defibrillator No    Reaction to local anesthetic No    Caffeine Concern Yes     Comment: 2 cups daily    Exercise No   Social History Narrative    The patient uses the following assistive device none.        The patient does live in a home with stairs.      Review of Systems   HENT:  Positive for ear pain.      All other systems negative unless otherwise stated in ROS or HPI above.       Pop Giles  MD  Internal Medicine       Call office with any questions or seek emergency care if necessary.   Patient understands and agrees to follow directions and advice.      ----------------------------------------- PATIENT INSTRUCTIONS-----------------------------------------   .    There are no Patient Instructions on file for this visit.

## 2024-11-22 ENCOUNTER — OFFICE VISIT (OUTPATIENT)
Facility: LOCATION | Age: 69
End: 2024-11-22

## 2024-11-22 VITALS
BODY MASS INDEX: 25.2 KG/M2 | HEIGHT: 71 IN | DIASTOLIC BLOOD PRESSURE: 69 MMHG | OXYGEN SATURATION: 98 % | HEART RATE: 101 BPM | SYSTOLIC BLOOD PRESSURE: 109 MMHG | WEIGHT: 180 LBS

## 2024-11-22 DIAGNOSIS — J01.40 ACUTE PANSINUSITIS, RECURRENCE NOT SPECIFIED: Primary | ICD-10-CM

## 2024-11-22 DIAGNOSIS — M75.52 BURSITIS OF BOTH SHOULDERS: ICD-10-CM

## 2024-11-22 DIAGNOSIS — M75.51 BURSITIS OF BOTH SHOULDERS: ICD-10-CM

## 2024-11-22 PROCEDURE — 3008F BODY MASS INDEX DOCD: CPT | Performed by: INTERNAL MEDICINE

## 2024-11-22 PROCEDURE — 3078F DIAST BP <80 MM HG: CPT | Performed by: INTERNAL MEDICINE

## 2024-11-22 PROCEDURE — 3074F SYST BP LT 130 MM HG: CPT | Performed by: INTERNAL MEDICINE

## 2024-11-22 PROCEDURE — 99213 OFFICE O/P EST LOW 20 MIN: CPT | Performed by: INTERNAL MEDICINE

## 2024-11-22 PROCEDURE — 1159F MED LIST DOCD IN RCRD: CPT | Performed by: INTERNAL MEDICINE

## 2024-11-22 PROCEDURE — 1160F RVW MEDS BY RX/DR IN RCRD: CPT | Performed by: INTERNAL MEDICINE

## 2024-11-22 RX ORDER — FLUTICASONE PROPIONATE 50 MCG
2 SPRAY, SUSPENSION (ML) NASAL DAILY
Qty: 3 EACH | Refills: 3 | Status: SHIPPED | OUTPATIENT
Start: 2024-11-22

## 2024-11-22 RX ORDER — PREDNISONE 20 MG/1
20 TABLET ORAL 2 TIMES DAILY WITH MEALS
Qty: 10 TABLET | Refills: 0 | Status: SHIPPED | OUTPATIENT
Start: 2024-11-22 | End: 2024-11-27

## 2024-11-22 NOTE — PROGRESS NOTES
INTERNAL MEDICINE OFFICE NOTE     Patient ID: Johnny Rosenberg is a 69 year old male.  Today's Date: 11/22/24  Chief Complaint: Pain (Feeling down and both Shoulder Hurts and also having running nose that runs all the time and a lot of stuff coming out since he came back home )    HPI    1.  He just returned from [place], he has had a week and a half history of worsening sinus congestion mucus, copious amounts of mucus, he is having some headaches and frontal sinus pain.  Is use over-the-counter treatments have any improvement.  His symptoms are worsening.  2.  Has been having lots of shoulder pain, this is bilateral, this started after his trip, he feels a lot of stiffness and burning sensation.         Vitals:    11/22/24 0909   BP: 109/69   Pulse: 101   SpO2: 98%   Weight: 180 lb (81.6 kg)   Height: 5' 11\" (1.803 m)     body mass index is 25.1 kg/m².  BP Readings from Last 3 Encounters:   11/22/24 109/69   07/29/24 136/70   02/26/24 (!) 84/50     The ASCVD Risk score (Manuel AMAYA, et al., 2019) failed to calculate for the following reasons:    The valid total cholesterol range is 130 to 320 mg/dL  Medications reviewed:  Current Outpatient Medications   Medication Sig Dispense Refill    amoxicillin clavulanate 875-125 MG Oral Tab Take 1 tablet by mouth 2 (two) times daily with meals for 5 days. TAKE WITH FOOD. 10 tablet 0    predniSONE 20 MG Oral Tab Take 1 tablet (20 mg total) by mouth 2 (two) times daily with meals for 5 days. 10 tablet 0    fluticasone propionate 50 MCG/ACT Nasal Suspension 2 sprays by Each Nare route daily. FOR NASAL CONGESTION/SINUS SYMPTOMS. 3 each 3    Hydrocortisone-Acetic Acid 1-2 % Otic Solution Place 3 drops into the left ear 2 (two) times daily as needed (ear itching). 10 mL 2    triamcinolone 0.1 % External Cream Apply topically 2 (two) times daily as needed. Apply 1 \"FINGER TIP UNIT\" 1-2 times daily as needed to affected areas for up to 14 days. Do not use on face, in  mouth, or under skin folds. 45 g 3    atorvastatin 40 MG Oral Tab Take 1 tablet (40 mg total) by mouth nightly. FOR CHOLESTEROL. 90 tablet 9    lisinopril 10 MG Oral Tab Take 1 tablet (10 mg total) by mouth nightly. FOR BLOOD PRESSURE. 90 tablet 9    Empagliflozin 25 MG Oral Tab Take 25 mg by mouth daily. FOR DIABETES. 90 tablet 9    metFORMIN HCl 1000 MG Oral Tab Take 1 tablet (1,000 mg total) by mouth 2 (two) times daily with meals. FOR DIABETES. 180 tablet 9    aspirin (ASPIRIN EC LOW DOSE) 81 MG Oral Tab EC Take 1 tablet (81 mg total) by mouth daily. 360 tablet 9    pantoprazole 20 MG Oral Tab EC Take 1 tablet (20 mg total) by mouth every morning before breakfast. 90 tablet 3    pregabalin (LYRICA) 50 MG Oral Cap Take 1 capsule (50 mg total) by mouth 2 (two) times daily. FOR NERVE PAIN. 180 capsule 3    tamsulosin 0.4 MG Oral Cap Take 1 capsule (0.4 mg total) by mouth daily. FOR PROSTATE. 90 capsule 9    clotrimazole-betamethasone 1-0.05 % External Cream Apply 1 Application topically 2 (two) times daily. 45 g 1         Assessment & Plan    1. Acute pansinusitis, recurrence not specified (Primary)  -     Amoxicillin-Pot Clavulanate; Take 1 tablet by mouth 2 (two) times daily with meals for 5 days. TAKE WITH FOOD.  Dispense: 10 tablet; Refill: 0  -     predniSONE; Take 1 tablet (20 mg total) by mouth 2 (two) times daily with meals for 5 days.  Dispense: 10 tablet; Refill: 0  -     Fluticasone Propionate; 2 sprays by Each Nare route daily. FOR NASAL CONGESTION/SINUS SYMPTOMS.  Dispense: 3 each; Refill: 3  2. Bursitis of both shoulders  -     predniSONE; Take 1 tablet (20 mg total) by mouth 2 (two) times daily with meals for 5 days.  Dispense: 10 tablet; Refill: 0  Plan  As above    Follow Up: No follow-ups on file..         Objective: Results:   Physical Exam  Vitals and nursing note reviewed.   Constitutional:       General: He is not in acute distress.     Appearance: Normal appearance.   HENT:      Head:  Normocephalic.      Right Ear: External ear normal.      Left Ear: External ear normal.   Eyes:      Extraocular Movements: Extraocular movements intact.      Conjunctiva/sclera: Conjunctivae normal.   Pulmonary:      Effort: Pulmonary effort is normal.   Musculoskeletal:      Right shoulder: Effusion and crepitus present. No bony tenderness. Decreased range of motion.      Left shoulder: Effusion and crepitus present. No bony tenderness. Decreased range of motion.      Cervical back: Normal range of motion and neck supple.   Skin:     Coloration: Skin is not jaundiced.   Neurological:      General: No focal deficit present.      Mental Status: He is alert and oriented to person, place, and time. Mental status is at baseline.   Psychiatric:         Mood and Affect: Mood normal.         Behavior: Behavior normal.        Reviewed:    Patient Active Problem List    Diagnosis    CKD (chronic kidney disease) stage 3, GFR 30-59 ml/min ([facility])    Chewing tobacco use    BPH with obstruction/lower urinary tract symptoms    Hypertension associated with type 2 diabetes mellitus ([facility])    Genu recurvatum, acquired, right    Dyslipidemia associated with type 2 diabetes mellitus ([facility])    Right leg weakness    Foraminal stenosis of lumbar region    Primary osteoarthritis of right knee    Chronic left shoulder pain    Hx of intracranial hemorrhage    Hx of cervical spine surgery     In 2016, spinal fusion surgery.  Dr Kennedy.        Type 2 diabetes mellitus with stage 3a chronic kidney disease, without long-term current use of insulin ([facility])      Allergies[1]     Social History     Socioeconomic History    Marital status:    Tobacco Use    Smoking status: Former     Types: Cigarettes     Passive exposure: Past    Smokeless tobacco: Never   Vaping Use    Vaping status: Never Used   Substance and Sexual Activity    Alcohol use: Yes     Alcohol/week: 0.0 standard drinks of alcohol     Comment: 1 drink per month    Drug use: No    Other Topics Concern    Pt has a pacemaker No    Pt has a defibrillator No    Reaction to local anesthetic No    Caffeine Concern Yes     Comment: 2 cups daily    Exercise No   Social History Narrative    The patient uses the following assistive device none.        The patient does live in a home with stairs.      Review of Systems  All other systems negative unless otherwise stated in ROS or HPI above.       Pop Giles MD  Internal Medicine       Call office with any questions or seek emergency care if necessary.   Patient understands and agrees to follow directions and advice.      ----------------------------------------- PATIENT INSTRUCTIONS-----------------------------------------   .    There are no Patient Instructions on file for this visit.             [1] No Known Allergies

## 2024-12-15 DIAGNOSIS — K21.00 GASTROESOPHAGEAL REFLUX DISEASE WITH ESOPHAGITIS, UNSPECIFIED WHETHER HEMORRHAGE: ICD-10-CM

## 2024-12-18 RX ORDER — PANTOPRAZOLE SODIUM 20 MG/1
20 TABLET, DELAYED RELEASE ORAL
Qty: 90 TABLET | Refills: 3 | Status: SHIPPED | OUTPATIENT
Start: 2024-12-18

## 2024-12-18 NOTE — TELEPHONE ENCOUNTER
Refill passed per [facility] protocol.    Requested Prescriptions   Pending Prescriptions Disp Refills    PANTOPRAZOLE 20 MG Oral Tab EC [Pharmacy Med Name: PANTOPRAZOLE 20MG TABLETS] 90 tablet 3     Sig: TAKE 1 TABLET(20 MG) BY MOUTH EVERY MORNING BEFORE BREAKFAST       Gastrointestional Medication Protocol Passed - 12/18/2024  4:45 PM        Passed - In person appointment or virtual visit in the past 12 mos or appointment in next 3 mos     Recent Outpatient Visits              3 weeks ago Acute pansinusitis, recurrence not specified    [facility] Pop Giles MD    Office Visit    4 months ago Dermatitis of left ear canal    [facility] Pop Giles MD    Office Visit    9 months ago Encounter for Medicare annual wellness exam    [facility] Pop Giles MD    Office Visit    1 year ago Cervical radiculitis    [facility], Pop Driver MD    Office Visit    1 year ago [facility]    [facility]                           Recent Outpatient Visits              3 weeks ago Acute pansinusitis, recurrence not specified    [facility] Pop Giles MD    Office Visit    4 months ago Dermatitis of left ear canal    [facility] Pop Giles MD    Office Visit    9 months ago Encounter for Medicare annual wellness exam    [facility] Pop Adan MD    Office Visit    1 year ago [facility]    [facility]    1 year ago Cervical radiculitis    [facility],  Pop Driver MD    Office Visit

## 2025-01-18 ENCOUNTER — TELEPHONE (OUTPATIENT)
Facility: LOCATION | Age: 70
End: 2025-01-18

## 2025-01-18 NOTE — TELEPHONE ENCOUNTER
Pt wife dropped a form for Dr Giles to complete on Saturday 01/18 . Form is in Dr Norman inbox at .

## 2025-02-11 ENCOUNTER — NURSE TRIAGE (OUTPATIENT)
Dept: INTERNAL MEDICINE CLINIC | Facility: CLINIC | Age: 70
End: 2025-02-11

## 2025-02-11 NOTE — TELEPHONE ENCOUNTER
Action Requested: Summary for Provider     []  Critical Lab, Recommendations Needed  [] Need Additional Advice  []   FYI    []   Need Orders  [] Need Medications Sent to Pharmacy  []  Other     SUMMARY: Patient already scheduled video appointment for tomorrow.  Denies wheezing, shortness of breath, chest pain.  Symptoms started yesterday.  Wife also on the call, states she has the flu, and Patient may possibly have it also.  Patient will keep video appointment tomorrow.  Placed on wait list.    Reason for call: Cough  Onset: Data Unavailable                     Reason for Disposition   Patient wants to be seen    Protocols used: Cough-A-OH

## 2025-02-12 ENCOUNTER — TELEMEDICINE (OUTPATIENT)
Facility: LOCATION | Age: 70
End: 2025-02-12
Payer: MEDICARE

## 2025-02-12 DIAGNOSIS — J45.998 POST VIRAL ASTHMA (HCC): Primary | ICD-10-CM

## 2025-02-12 PROCEDURE — 1159F MED LIST DOCD IN RCRD: CPT | Performed by: INTERNAL MEDICINE

## 2025-02-12 PROCEDURE — 1160F RVW MEDS BY RX/DR IN RCRD: CPT | Performed by: INTERNAL MEDICINE

## 2025-02-12 PROCEDURE — 99213 OFFICE O/P EST LOW 20 MIN: CPT | Performed by: INTERNAL MEDICINE

## 2025-02-12 RX ORDER — AZITHROMYCIN 250 MG/1
TABLET, FILM COATED ORAL
Qty: 6 TABLET | Refills: 0 | Status: SHIPPED | OUTPATIENT
Start: 2025-02-12 | End: 2025-02-17

## 2025-02-12 RX ORDER — FLUTICASONE PROPIONATE AND SALMETEROL 250; 50 UG/1; UG/1
1 POWDER RESPIRATORY (INHALATION) EVERY 12 HOURS SCHEDULED
Qty: 3 EACH | Refills: 3 | Status: SHIPPED | OUTPATIENT
Start: 2025-02-12

## 2025-02-12 NOTE — PROGRESS NOTES
INTERNAL MEDICINE VIDEO VISIT NOTE     Patient ID: Johnny Rosenberg is a 69 year old male.  Today's Date: 02/12/25  Upper Respiratory Infection   This is a new problem. The current episode started in the past 7 days. The problem has been gradually worsening. The maximum temperature recorded prior to his arrival was 101 - 101.9 F. Associated symptoms include congestion, coughing, headaches, joint pain and rhinorrhea. He has tried nothing for the symptoms. The treatment provided no relief.         There were no vitals filed for this visit.  body mass index is unknown because there is no height or weight on file.  BP Readings from Last 3 Encounters:   11/22/24 109/69   07/29/24 136/70   02/26/24 (!) 84/50     The ASCVD Risk score (Manuel AMAYA, et al., 2019) failed to calculate for the following reasons:    The valid total cholesterol range is 130 to 320 mg/dL  Medications reviewed:  Current Outpatient Medications   Medication Sig Dispense Refill    fluticasone-salmeterol (ADVAIR DISKUS) 250-50 MCG/ACT Inhalation Aerosol Powder, Breath Activated Inhale 1 puff into the lungs every 12 (twelve) hours. 3 each 3    azithromycin 250 MG Oral Tab Take 2 tablets (500 mg total) by mouth daily for 1 day, THEN 1 tablet (250 mg total) daily for 4 days. 6 tablet 0    pantoprazole 20 MG Oral Tab EC Take 1 tablet (20 mg total) by mouth before breakfast. 90 tablet 3    fluticasone propionate 50 MCG/ACT Nasal Suspension 2 sprays by Each Nare route daily. FOR NASAL CONGESTION/SINUS SYMPTOMS. 3 each 3    Hydrocortisone-Acetic Acid 1-2 % Otic Solution Place 3 drops into the left ear 2 (two) times daily as needed (ear itching). 10 mL 2    triamcinolone 0.1 % External Cream Apply topically 2 (two) times daily as needed. Apply 1 \"FINGER TIP UNIT\" 1-2 times daily as needed to affected areas for up to 14 days. Do not use on face, in mouth, or under skin folds. 45 g 3    atorvastatin 40 MG Oral Tab Take 1 tablet (40 mg total) by  mouth nightly. FOR CHOLESTEROL. 90 tablet 9    lisinopril 10 MG Oral Tab Take 1 tablet (10 mg total) by mouth nightly. FOR BLOOD PRESSURE. 90 tablet 9    Empagliflozin 25 MG Oral Tab Take 25 mg by mouth daily. FOR DIABETES. 90 tablet 9    metFORMIN HCl 1000 MG Oral Tab Take 1 tablet (1,000 mg total) by mouth 2 (two) times daily with meals. FOR DIABETES. 180 tablet 9    aspirin (ASPIRIN EC LOW DOSE) 81 MG Oral Tab EC Take 1 tablet (81 mg total) by mouth daily. 360 tablet 9    pregabalin (LYRICA) 50 MG Oral Cap Take 1 capsule (50 mg total) by mouth 2 (two) times daily. FOR NERVE PAIN. 180 capsule 3    tamsulosin 0.4 MG Oral Cap Take 1 capsule (0.4 mg total) by mouth daily. FOR PROSTATE. 90 capsule 9    clotrimazole-betamethasone 1-0.05 % External Cream Apply 1 Application topically 2 (two) times daily. 45 g 1         Assessment & Plan    1. Post viral asthma (HCC) (Primary)  -     Fluticasone-Salmeterol; Inhale 1 puff into the lungs every 12 (twelve) hours.  Dispense: 3 each; Refill: 3  -     Azithromycin; Take 2 tablets (500 mg total) by mouth daily for 1 day, THEN 1 tablet (250 mg total) daily for 4 days.  Dispense: 6 tablet; Refill: 0  Plan  Likely flu, feeling a bit better, >5 days, advair for cough, add azithro if symptoms worsen by weekend.     Follow Up: No follow-ups on file..         Objective: Results:   Physical Exam  Nursing note reviewed.   Constitutional:       General: He is not in acute distress.     Appearance: Normal appearance. He is ill-appearing.   HENT:      Head: Normocephalic and atraumatic.      Right Ear: External ear normal.      Left Ear: External ear normal.      Nose: Nose normal.   Eyes:      Conjunctiva/sclera: Conjunctivae normal.   Pulmonary:      Effort: Pulmonary effort is normal. No respiratory distress.   Musculoskeletal:      Cervical back: Normal range of motion and neck supple.   Skin:     Coloration: Skin is not jaundiced.   Neurological:      General: No focal deficit  present.      Mental Status: He is alert and oriented to person, place, and time. Mental status is at baseline.   Psychiatric:         Mood and Affect: Mood normal.         Thought Content: Thought content normal.        Reviewed:  Patient Active Problem List    Diagnosis    CKD (chronic kidney disease) stage 3, GFR 30-59 ml/min ([facility])    Chewing tobacco use    BPH with obstruction/lower urinary tract symptoms    Hypertension associated with type 2 diabetes mellitus (HCC)    Genu recurvatum, acquired, right    Dyslipidemia associated with type 2 diabetes mellitus ([facility])    Right leg weakness    Foraminal stenosis of lumbar region    Primary osteoarthritis of right knee    Chronic left shoulder pain    Hx of intracranial hemorrhage    Hx of cervical spine surgery     In 2016, spinal fusion surgery.  Dr Kennedy.        Type 2 diabetes mellitus with stage 3a chronic kidney disease, without long-term current use of insulin ([facility])      Allergies[1]     Social History     Socioeconomic History    Marital status:    Tobacco Use    Smoking status: Former     Types: Cigarettes     Passive exposure: Past    Smokeless tobacco: Never   Vaping Use    Vaping status: Never Used   Substance and Sexual Activity    Alcohol use: Yes     Alcohol/week: 0.0 standard drinks of alcohol     Comment: 1 drink per month    Drug use: No   Other Topics Concern    Pt has a pacemaker No    Pt has a defibrillator No    Reaction to local anesthetic No    Caffeine Concern Yes     Comment: 2 cups daily    Exercise No   Social History Narrative    The patient uses the following assistive device none.        The patient does live in a home with stairs.      Review of Systems   HENT:  Positive for congestion and rhinorrhea.    Respiratory:  Positive for cough.    Musculoskeletal:  Positive for joint pain.   Neurological:  Positive for headaches.     All other systems negative unless otherwise stated in ROS or HPI above.       Pop Giles MD  Internal  Medicine       Call office with any questions or seek emergency care if necessary.   Patient understands and agrees to follow directions and advice.    Telehealth Verbal Consent   I conducted a telehealth visit with Johnny Rosenberg today, 02/12/25, which was completed using two-way, real-time interactive audio and video communication. This has been done in good lonnie to provide continuity of care in the best interest of the provider-patient relationship, due to the COVID - public health crisis/national emergency where restrictions of face-to-face office visits are ongoing. Every conscious effort was taken to allow for sufficient and adequate time to complete the visit.The patient was made aware of the limitations of the telehealth visit, including treatment limitations as no physical exam could be performed.  The patient was advised to call 911 or to go to the ER in case there was an emergency.  The patient was also advised of the potential privacy & security concerns related to the telehealth platform. The patient was made aware of where to find [facility]'s notice of privacy practices, telehealth consent form and other related consent forms and documents.  which are located on the [facility] website. The patient verbally agreed to telehealth consent form, related consents and the risks discussed.  Lastly, the patient confirmed that they were in [place]. Included in this visit, time may have been spent reviewing labs, medications, radiology tests and decision making. Appropriate medical decision-making and tests are ordered as detailed in the plan of care above.  Coding/billing information is submitted for this visit based on complexity of care and/or time spent for the visit.  ----------------------------------------- PATIENT INSTRUCTIONS-----------------------------------------     There are no Patient Instructions on file for this visit.           [1] No Known Allergies

## 2025-02-22 ENCOUNTER — OFFICE VISIT (OUTPATIENT)
Facility: LOCATION | Age: 70
End: 2025-02-22

## 2025-02-22 VITALS
OXYGEN SATURATION: 97 % | HEIGHT: 71 IN | DIASTOLIC BLOOD PRESSURE: 53 MMHG | RESPIRATION RATE: 18 BRPM | BODY MASS INDEX: 24.08 KG/M2 | HEART RATE: 86 BPM | SYSTOLIC BLOOD PRESSURE: 91 MMHG | WEIGHT: 172 LBS

## 2025-02-22 DIAGNOSIS — H00.012 HORDEOLUM EXTERNUM OF RIGHT LOWER EYELID: Primary | ICD-10-CM

## 2025-02-22 RX ORDER — ERYTHROMYCIN 5 MG/G
1 OINTMENT OPHTHALMIC 3 TIMES DAILY
Qty: 1 EACH | Refills: 0 | Status: SHIPPED | OUTPATIENT
Start: 2025-02-22 | End: 2025-03-01

## 2025-02-22 NOTE — PATIENT INSTRUCTIONS
Make appointment for annual exam  Follow up with Dr Crocker at [facility] [place] [facility] or  [facility] [phone]

## 2025-02-22 NOTE — PROGRESS NOTES
INTERNAL MEDICINE OFFICE NOTE     Patient ID: Johnny Rosenberg is a 69 year old male.  Today's Date: 02/22/25  Chief Complaint: Eye Problem (Right eye pain and swollen for last 2-3 days)    HPI  Johnny Rosenberg is a 69 year old male who presents with chief complaint of right eye irritation, redness, and swelling lower eyelid for 3 days. Symptoms have been worse since onset.  Denies photophobia, pain with movement of eye, fever, cold symptoms, or contact with irritant. Denies any other aggravating or relieving factors at home. Denies any other treatment attempts prior to arrival.      Vitals:    02/22/25 0915   BP: 91/53   Pulse: 86   Resp: 18   SpO2: 97%   Weight: 172 lb (78 kg)   Height: 5' 11\" (1.803 m)     body mass index is 23.99 kg/m².  BP Readings from Last 3 Encounters:   02/22/25 91/53   11/22/24 109/69   07/29/24 136/70     The ASCVD Risk score (Manuel AMAYA, et al., 2019) failed to calculate for the following reasons:    The valid total cholesterol range is 130 to 320 mg/dL  Medications reviewed:  Current Outpatient Medications   Medication Sig Dispense Refill    erythromycin 5 MG/GM Ophthalmic Ointment Place 1 Application into the right eye 3 (three) times daily for 7 days. 1 each 0    fluticasone-salmeterol (ADVAIR DISKUS) 250-50 MCG/ACT Inhalation Aerosol Powder, Breath Activated Inhale 1 puff into the lungs every 12 (twelve) hours. 3 each 3    pantoprazole 20 MG Oral Tab EC Take 1 tablet (20 mg total) by mouth before breakfast. 90 tablet 3    triamcinolone 0.1 % External Cream Apply topically 2 (two) times daily as needed. Apply 1 \"FINGER TIP UNIT\" 1-2 times daily as needed to affected areas for up to 14 days. Do not use on face, in mouth, or under skin folds. 45 g 3    atorvastatin 40 MG Oral Tab Take 1 tablet (40 mg total) by mouth nightly. FOR CHOLESTEROL. 90 tablet 9    lisinopril 10 MG Oral Tab Take 1 tablet (10 mg total) by mouth nightly. FOR BLOOD PRESSURE. 90 tablet 9     Empagliflozin 25 MG Oral Tab Take 25 mg by mouth daily. FOR DIABETES. 90 tablet 9    metFORMIN HCl 1000 MG Oral Tab Take 1 tablet (1,000 mg total) by mouth 2 (two) times daily with meals. FOR DIABETES. 180 tablet 9    aspirin (ASPIRIN EC LOW DOSE) 81 MG Oral Tab EC Take 1 tablet (81 mg total) by mouth daily. 360 tablet 9    pregabalin (LYRICA) 50 MG Oral Cap Take 1 capsule (50 mg total) by mouth 2 (two) times daily. FOR NERVE PAIN. 180 capsule 3    tamsulosin 0.4 MG Oral Cap Take 1 capsule (0.4 mg total) by mouth daily. FOR PROSTATE. 90 capsule 9    clotrimazole-betamethasone 1-0.05 % External Cream Apply 1 Application topically 2 (two) times daily. 45 g 1    fluticasone propionate 50 MCG/ACT Nasal Suspension 2 sprays by Each Nare route daily. FOR NASAL CONGESTION/SINUS SYMPTOMS. (Patient not taking: Reported on 2/22/2025) 3 each 3    Hydrocortisone-Acetic Acid 1-2 % Otic Solution Place 3 drops into the left ear 2 (two) times daily as needed (ear itching). (Patient not taking: Reported on 2/22/2025) 10 mL 2         Assessment & Plan    1. Hordeolum externum of right lower eyelid (Primary)  -     Erythromycin; Place 1 Application into the right eye 3 (three) times daily for 7 days.  Dispense: 1 each; Refill: 0      Plan  Discussed physical exam and hpi with pt. Pt has reassuring physical exam consistent with hordeolum externum of right lower eyelid with mild conjunctivitis and mild edema under right eyelid. Treatment options discussed with patient and explained in detail. Will start erythromycin eye ointment today, along with supportive care and follow up with PCP or Optho. The risks, benefits and potential side effects of possible medications were reviewed. Alternatives were discussed. Monitoring parameters and expected course outlined. Patient to call PCP or go to emergency department if symptoms fail to respond as outlined, or worsen in any way. The patient agreed with the plan.       Follow Up: No follow-ups on  file..         Objective: Results:   Physical Exam  Constitutional:       General: He is not in acute distress.     Appearance: Normal appearance.   HENT:      Head: Normocephalic and atraumatic.   Eyes:      General:         Right eye: Discharge and hordeolum present.         Left eye: No discharge.      Extraocular Movements: Extraocular movements intact.      Right eye: No nystagmus.      Left eye: No nystagmus.      Conjunctiva/sclera:      Right eye: Right conjunctiva is injected. Exudate present. No hemorrhage.     Left eye: Left conjunctiva is not injected. No exudate or hemorrhage.     Pupils: Pupils are equal, round, and reactive to light.     Cardiovascular:      Rate and Rhythm: Normal rate and regular rhythm.      Heart sounds: Normal heart sounds.   Pulmonary:      Effort: Pulmonary effort is normal.      Breath sounds: Normal breath sounds.   Skin:     General: Skin is warm and dry.   Neurological:      Mental Status: He is alert.        Reviewed:    Patient Active Problem List    Diagnosis    CKD (chronic kidney disease) stage 3, GFR 30-59 ml/min ([facility])    Chewing tobacco use    BPH with obstruction/lower urinary tract symptoms    Hypertension associated with type 2 diabetes mellitus (HCC)    Genu recurvatum, acquired, right    Dyslipidemia associated with type 2 diabetes mellitus (HCC)    Right leg weakness    Foraminal stenosis of lumbar region    Primary osteoarthritis of right knee    Chronic left shoulder pain    Hx of intracranial hemorrhage    Hx of cervical spine surgery     In 2016, spinal fusion surgery.  Dr Kennedy.        Type 2 diabetes mellitus with stage 3a chronic kidney disease, without long-term current use of insulin ([facility])      Allergies[1]     Social History     Socioeconomic History    Marital status:    Tobacco Use    Smoking status: Former     Types: Cigarettes     Passive exposure: Past    Smokeless tobacco: Never   Vaping Use    Vaping status: Never Used   Substance and  Sexual Activity    Alcohol use: Yes     Alcohol/week: 0.0 standard drinks of alcohol     Comment: 1 drink per month    Drug use: No   Other Topics Concern    Pt has a pacemaker No    Pt has a defibrillator No    Reaction to local anesthetic No    Caffeine Concern Yes     Comment: 2 cups daily    Exercise No   Social History Narrative    The patient uses the following assistive device none.        The patient does live in a home with stairs.      Review of Systems   Constitutional:  Negative for fever.   HENT: Negative.     Eyes:  Positive for discharge and redness. Negative for photophobia, pain and visual disturbance.   Respiratory: Negative.     Cardiovascular: Negative.    Neurological: Negative.      All other systems negative unless otherwise stated in ROS or HPI above.       DYLLAN Higginbotham  Internal Medicine       Call office with any questions or seek emergency care if necessary.   Patient understands and agrees to follow directions and advice.      ----------------------------------------- PATIENT INSTRUCTIONS-----------------------------------------   .    Patient Instructions   Make appointment for annual exam  Follow up with Dr Crocker at [facility] or  [facility] [phone]              [1] No Known Allergies

## 2025-03-13 ENCOUNTER — TELEPHONE (OUTPATIENT)
Facility: LOCATION | Age: 70
End: 2025-03-13

## 2025-03-29 ENCOUNTER — TELEPHONE (OUTPATIENT)
Facility: LOCATION | Age: 70
End: 2025-03-29

## 2025-03-29 NOTE — TELEPHONE ENCOUNTER
Please relay to patient or Listed contact if unable to reach:  Félix SHELTON Rosenbergoli Giles is unfortunately no longer with our organization,  My name is Dr. Londono and I'm located in [facility] and assigned to his former patients thru [place].   I am reaching out in regards to remind you that  you are due forAnnual Physical Exam, Hypertension follow up, Establish care visit with New Physician, Diabetes Follow up, You are also due for diabetes eye exam, If you do not have cataracts or floaters we can do a retinal screening in office in our [place] location, however if you have vision problems, I can place a referral to ophthalmologist if you don't already have one. If you already do have one please make sure that they please send a  Fax of your annual retinal screen to our office fax number: [phone] , and Colorectal cancer screening: given your age of greater than 45 I highly recommend you get your colorectal cancer screening if you decline screening colonoscopy a non invasive option is a stool Cologuard test which is done every three years. For average risk patients. Please follow up in clinic and we can discuss which option is best for you. I strive for delivering High quality/value care to my patients. If you have any questions, please schedule follow up with me  If you have already had a colonoscopy or Cologuard recently completed, please send Fax the records to our office [phone] However if you have established care with another provider please call our office [phone] or send us a [employer] message and we will remove your name from our list to indicate you have a new provider and will gladly send records to them if requested. for more information,  A Video on how to collect the cologuard sample on youtube: titled: How to properly use Cologuard colon cancer at-home test kit [email or web address]/watch?[email or web address]    Please complete this within the next month as I  value providing high value evidence based medical care and prevention and would like to go over the next steps needed In your wellness endeavor.     However if you have established care with another provider please call our office [phone] or send us a [employer] message and we will remove your name from our list to indicate you have a new provider and will gladly send records to them if requested.      Wish you all the best in your endeavor for better health  -Dr. Ovidio Londono MD, MPH

## 2025-04-08 ENCOUNTER — TELEPHONE (OUTPATIENT)
Facility: LOCATION | Age: 70
End: 2025-04-08

## 2025-05-20 ENCOUNTER — TELEPHONE (OUTPATIENT)
Dept: INTERNAL MEDICINE CLINIC | Facility: CLINIC | Age: 70
End: 2025-05-20

## 2025-06-04 DIAGNOSIS — N40.1 BPH WITH OBSTRUCTION/LOWER URINARY TRACT SYMPTOMS: ICD-10-CM

## 2025-06-04 DIAGNOSIS — N13.8 BPH WITH OBSTRUCTION/LOWER URINARY TRACT SYMPTOMS: ICD-10-CM

## 2025-06-04 NOTE — TELEPHONE ENCOUNTER
tamsulosin 0.4 MG Oral Cap Take 1 capsule (0.4 mg total) by mouth daily. FOR PROSTATE. 90 capsule 9     Refill Requested

## 2025-06-05 RX ORDER — TAMSULOSIN HYDROCHLORIDE 0.4 MG/1
0.4 CAPSULE ORAL DAILY
Qty: 90 CAPSULE | Refills: 0 | Status: SHIPPED | OUTPATIENT
Start: 2025-06-05

## 2025-06-05 NOTE — TELEPHONE ENCOUNTER
Refill passed per Clinic protocol.    Sending 90 day courtesy     Patient has an appointment   Future Appointments   Date Time Provider Department Center   7/10/2025  8:00 AM Sim Kevin MD [facility]       Requested Prescriptions   Pending Prescriptions Disp Refills    tamsulosin 0.4 MG Oral Cap 90 capsule 9     Sig: Take 1 capsule (0.4 mg total) by mouth daily. FOR PROSTATE.       Genitourinary Medications Passed - 6/5/2025  8:35 AM        Passed - Patient does not have pulmonary hypertension on problem list        Passed - In person appointment or virtual visit in the past 12 mos or appointment in next 3 mos     Recent Outpatient Visits              3 months ago Hordeolum externum of right lower eyelid    [facility] Larissa Landaverde APRN    Office Visit    3 months ago Post viral asthma (HCC)    [facility] Pop Giles MD    Telemedicine    6 months ago Acute pansinusitis, recurrence not specified    [facility] Pop Giles MD    Office Visit    10 months ago Dermatitis of left ear canal    [facility] Pop Giles MD    Office Visit    1 year ago Encounter for Medicare annual wellness exam    [facility] Pop Giles MD    Office Visit          Future Appointments         Provider Department Appt Notes    In 1 month Sim Kevin MD [facility], [address] [place] [address] Np est care                    Passed - Medication is active on med list

## 2025-06-16 ENCOUNTER — TELEPHONE (OUTPATIENT)
Dept: INTERNAL MEDICINE CLINIC | Facility: CLINIC | Age: 70
End: 2025-06-16

## 2025-06-16 NOTE — TELEPHONE ENCOUNTER
Please relay to patient or Listed contact if unable to reach:  Félix Zhaovinder S [facility]   Dr. Giles is unfortunately no longer with our organization,  My name is Dr. Londono and I'm located in [place] assigned to his former patients thru [place].   I am reaching out in regards to remind you that  you are due forAnnual Physical Exam, Diabetes Follow up, and You are also due for diabetes eye exam, If you do not have cataracts or floaters we can do a retinal screening in office in our [place] location, however if you have vision problems, I can place a referral to ophthalmologist if you don't already have one. If you already do have one please make sure that they please send a  Fax of your annual retinal screen to our office fax number: [phone]     Please complete this within the next month as I value providing high value evidence based medical care and prevention and would like to go over the next steps needed In your wellness endeavor.     However if you have established care with another provider please call our office [phone] or send us a [employer] message and we will remove your name from our list to indicate you have a new provider and will gladly send records to them if requested.      Wish you all the best in your endeavor for better health  -Dr. Ovidio Londono MD, MPH

## 2025-07-10 ENCOUNTER — OFFICE VISIT (OUTPATIENT)
Dept: INTERNAL MEDICINE CLINIC | Facility: CLINIC | Age: 70
End: 2025-07-10
Payer: MEDICARE

## 2025-07-10 VITALS
HEIGHT: 71 IN | TEMPERATURE: 99 F | RESPIRATION RATE: 18 BRPM | BODY MASS INDEX: 24.25 KG/M2 | HEART RATE: 98 BPM | SYSTOLIC BLOOD PRESSURE: 100 MMHG | OXYGEN SATURATION: 98 % | WEIGHT: 173.19 LBS | DIASTOLIC BLOOD PRESSURE: 58 MMHG

## 2025-07-10 DIAGNOSIS — E11.22 TYPE 2 DIABETES MELLITUS WITH STAGE 3 CHRONIC KIDNEY DISEASE, WITHOUT LONG-TERM CURRENT USE OF INSULIN, UNSPECIFIED WHETHER STAGE 3A OR 3B CKD (HCC): ICD-10-CM

## 2025-07-10 DIAGNOSIS — Z12.5 PROSTATE CANCER SCREENING: ICD-10-CM

## 2025-07-10 DIAGNOSIS — N40.1 BPH WITH OBSTRUCTION/LOWER URINARY TRACT SYMPTOMS: ICD-10-CM

## 2025-07-10 DIAGNOSIS — Z86.79 HX OF INTRACRANIAL HEMORRHAGE: ICD-10-CM

## 2025-07-10 DIAGNOSIS — N18.30 STAGE 3 CHRONIC KIDNEY DISEASE, UNSPECIFIED WHETHER STAGE 3A OR 3B CKD (HCC): Chronic | ICD-10-CM

## 2025-07-10 DIAGNOSIS — E11.69 TYPE 2 DIABETES MELLITUS WITH HYPERCHOLESTEROLEMIA (HCC): ICD-10-CM

## 2025-07-10 DIAGNOSIS — M48.061 FORAMINAL STENOSIS OF LUMBAR REGION: ICD-10-CM

## 2025-07-10 DIAGNOSIS — Z00.00 ENCOUNTER FOR ANNUAL HEALTH EXAMINATION: Primary | ICD-10-CM

## 2025-07-10 DIAGNOSIS — N13.8 BPH WITH OBSTRUCTION/LOWER URINARY TRACT SYMPTOMS: ICD-10-CM

## 2025-07-10 DIAGNOSIS — M21.861: ICD-10-CM

## 2025-07-10 DIAGNOSIS — Z98.890 HX OF CERVICAL SPINE SURGERY: ICD-10-CM

## 2025-07-10 DIAGNOSIS — E78.00 TYPE 2 DIABETES MELLITUS WITH HYPERCHOLESTEROLEMIA (HCC): ICD-10-CM

## 2025-07-10 DIAGNOSIS — M17.11 PRIMARY OSTEOARTHRITIS OF RIGHT KNEE: ICD-10-CM

## 2025-07-10 DIAGNOSIS — N18.30 TYPE 2 DIABETES MELLITUS WITH STAGE 3 CHRONIC KIDNEY DISEASE, WITHOUT LONG-TERM CURRENT USE OF INSULIN, UNSPECIFIED WHETHER STAGE 3A OR 3B CKD (HCC): ICD-10-CM

## 2025-07-10 PROBLEM — E11.59 HYPERTENSION ASSOCIATED WITH TYPE 2 DIABETES MELLITUS (HCC): Status: RESOLVED | Noted: 2021-07-16 | Resolved: 2025-07-10

## 2025-07-10 PROBLEM — R29.898 RIGHT LEG WEAKNESS: Status: RESOLVED | Noted: 2019-07-24 | Resolved: 2025-07-10

## 2025-07-10 PROBLEM — Z72.0 CHEWING TOBACCO USE: Status: RESOLVED | Noted: 2022-05-23 | Resolved: 2025-07-10

## 2025-07-10 PROBLEM — G89.29 CHRONIC LEFT SHOULDER PAIN: Status: RESOLVED | Noted: 2018-12-18 | Resolved: 2025-07-10

## 2025-07-10 PROBLEM — I15.2 HYPERTENSION ASSOCIATED WITH TYPE 2 DIABETES MELLITUS (HCC): Status: RESOLVED | Noted: 2021-07-16 | Resolved: 2025-07-10

## 2025-07-10 PROBLEM — M25.512 CHRONIC LEFT SHOULDER PAIN: Status: RESOLVED | Noted: 2018-12-18 | Resolved: 2025-07-10

## 2025-07-10 PROCEDURE — G0438 PPPS, INITIAL VISIT: HCPCS | Performed by: INTERNAL MEDICINE

## 2025-07-10 PROCEDURE — 99214 OFFICE O/P EST MOD 30 MIN: CPT | Performed by: INTERNAL MEDICINE

## 2025-07-10 PROCEDURE — 99499 UNLISTED E&M SERVICE: CPT | Performed by: INTERNAL MEDICINE

## 2025-07-10 RX ORDER — LISINOPRIL 5 MG/1
2.5 TABLET ORAL DAILY
Qty: 45 TABLET | Refills: 3 | Status: SHIPPED | OUTPATIENT
Start: 2025-07-10 | End: 2025-10-08

## 2025-07-10 NOTE — PROGRESS NOTES
Subjective:   Johnny Rosenberg is a 70 year old male who presents for a MA AHA (Medicare Advantage Annual Health Assessment) and Medicare Subsequent Annual Wellness visit (Pt already had Initial Annual Wellness) and scheduled follow up of multiple significant but stable problems.   History of Present Illness      Patient has overall maintained his usual state of health.  He reports undergoing daily physical activity, with no longer any symptoms of chest pain, left upper extremity pain, or shortness of breath.  No recent laboratory evaluation, with most recent hemoglobin A1c 12 months ago at 7.5%.  No acute concerns at this time.    History/Other:   Fall Risk Assessment:   He has been screened for Falls and is High Risk. Fall Prevention information provided to patient in After Visit Summary.    Do you feel unsteady when standing or walking?: No  Do you worry about falling?: Yes  Have you fallen in the past year?: No     Cognitive Assessment:   Abnormal  What day of the week is this?: Correct  What month is it?: Correct  What year is it?: Correct  Recall \"Ball\": Incorrect  Recall \"Flag\": Correct  Recall \"Tree\": Incorrect  MMSE SCORE: 19.5      Functional Ability/Status:   Johnny Rosenberg has some abnormal functions as listed below:  He has difficulties Affording Meds based on screening of functional status. He has Walking problems based on screening of functional status.       Depression Screening (PHQ):  PHQ-2 SCORE: 0  , done 7/10/2025        5 minutes spent screening and counseling for depression    Advanced Directives:   He does NOT have a Living Will. [ ]  He does NOT have a Power of  for Health Care. [ ]  Discussed Advance Care Planning with patient (and family/surrogate if present). Standard forms made available to patient in After Visit Summary.      Patient Active Problem List   Diagnosis    Type 2 diabetes mellitus with stage 3a chronic kidney disease, without long-term current use of insulin  (HCC)    Hx of intracranial hemorrhage    Hx of cervical spine surgery    Chronic left shoulder pain    Primary osteoarthritis of right knee    Foraminal stenosis of lumbar region    Right leg weakness    Dyslipidemia associated with type 2 diabetes mellitus (HCC)    Genu recurvatum, acquired, right    Hypertension associated with type 2 diabetes mellitus ([facility])    BPH with obstruction/lower urinary tract symptoms    Chewing tobacco use    CKD (chronic kidney disease) stage 3, GFR 30-59 ml/min ([facility])     Allergies:  He has no known allergies.    Current Medications:  Outpatient Medications Marked as Taking for the 7/10/25 encounter (Office Visit) with Sim Kevin MD   Medication Sig    tamsulosin 0.4 MG Oral Cap Take 1 capsule (0.4 mg total) by mouth daily. FOR PROSTATE.    fluticasone-salmeterol (ADVAIR DISKUS) 250-50 MCG/ACT Inhalation Aerosol Powder, Breath Activated Inhale 1 puff into the lungs every 12 (twelve) hours.    pantoprazole 20 MG Oral Tab EC Take 1 tablet (20 mg total) by mouth before breakfast.    fluticasone propionate 50 MCG/ACT Nasal Suspension 2 sprays by Each Nare route daily. FOR NASAL CONGESTION/SINUS SYMPTOMS.    Hydrocortisone-Acetic Acid 1-2 % Otic Solution Place 3 drops into the left ear 2 (two) times daily as needed (ear itching).    triamcinolone 0.1 % External Cream Apply topically 2 (two) times daily as needed. Apply 1 \"FINGER TIP UNIT\" 1-2 times daily as needed to affected areas for up to 14 days. Do not use on face, in mouth, or under skin folds.    atorvastatin 40 MG Oral Tab Take 1 tablet (40 mg total) by mouth nightly. FOR CHOLESTEROL.    lisinopril 10 MG Oral Tab Take 1 tablet (10 mg total) by mouth nightly. FOR BLOOD PRESSURE.    Empagliflozin 25 MG Oral Tab Take 25 mg by mouth daily. FOR DIABETES.    metFORMIN HCl 1000 MG Oral Tab Take 1 tablet (1,000 mg total) by mouth 2 (two) times daily with meals. FOR DIABETES.    aspirin (ASPIRIN EC LOW DOSE) 81 MG Oral Tab EC Take 1  tablet (81 mg total) by mouth daily.    pregabalin (LYRICA) 50 MG Oral Cap Take 1 capsule (50 mg total) by mouth 2 (two) times daily. FOR NERVE PAIN.    clotrimazole-betamethasone 1-0.05 % External Cream Apply 1 Application topically 2 (two) times daily.       Medical History:  He  has a past medical history of Diabetes (HCC), Intracranial hemorrhage following injury (HCC) (12/4/2015), Other and unspecified hyperlipidemia, and Serrated adenoma of colon (11/04/2020).  Surgical History:  He  has a past surgical history that includes other surgical history; excis lumbar disk,one level; colonoscopy; colonoscopy (11/04/2020); and cataract (Left, 01/06/2022).   Family History:  His family history includes Colon Cancer in his father.  Social History:  He  reports that he has quit smoking. His smoking use included cigarettes. He has been exposed to tobacco smoke. He has never used smokeless tobacco. He reports current alcohol use. He reports that he does not use drugs.    Tobacco:  He smoked tobacco in the past but quit greater than 12 months ago.  Social History     Tobacco Use   Smoking Status Former    Types: Cigarettes    Passive exposure: Past   Smokeless Tobacco Never        CAGE Alcohol Screen:   CAGE screening score of 0 on 7/10/2025, showing low risk of alcohol abuse.      Patient Care Team:  Sim Kevin MD as PCP - General (Internal Medicine)  Nabil Valentino MD (Family Practice)  Behar, Alex, MD (Physical Medicine)    Review of Systems  GENERAL: feels well otherwise  SKIN: denies any unusual skin lesions  EYES: denies blurred vision or double vision  HEENT: denies nasal congestion, sinus pain or ST  LUNGS: denies shortness of breath with exertion  CARDIOVASCULAR: denies chest pain on exertion  GI: denies abdominal pain, denies heartburn  : 0 per night nocturia, no complaint of urinary incontinence  MUSCULOSKELETAL: denies back pain  NEURO: denies headaches  PSYCHE: denies depression or anxiety  HEMATOLOGIC:  denies hx of anemia  ENDOCRINE: denies thyroid history  ALL/ASTHMA: denies hx of allergy or asthma    Objective:   Physical Exam  General Appearance:  Alert, cooperative, no distress, appears stated age   Head:  Normocephalic, without obvious abnormality, atraumatic   Eyes:  BL conjunctiva WNL   Ears:  TM WNL BL   Nose: Deferred   Throat: Deferred   Neck: Supple, symmetrical, trachea midline, no adenopathy, thyroid: not enlarged, symmetric, no tenderness/mass/nodules, no carotid bruit or JVD   Back:   Symmetric, no curvature, ROM normal, no CVA tenderness   Lungs:   Clear to auscultation bilaterally, respirations unlabored   Chest Wall:  No tenderness or deformity   Heart:  Regular rate and rhythm, S1, S2 normal, no murmur, rub or gallop   Abdomen:   Soft, non-tender, bowel sounds active all four quadrants,  no masses, no organomegaly   Genitalia: Deferred   Rectal: Deferred   Extremities: No edema   Pulses: 2+ and symmetric   Skin: Skin color, texture, turgor normal, no rashes or lesions   Lymph nodes: Cervical nodes normal   Neurologic: Grossly normal   Bilateral barefoot skin diabetic exam is normal, visualized feet and the appearance is normal.  Bilateral monofilament/sensation of both feet is normal.  Pulsation pedal pulse exam of both lower legs/feet is normal as well.  /58 (BP Location: Right arm, Patient Position: Sitting, Cuff Size: adult)   Pulse 98   Temp 98.5 °F (36.9 °C) (Temporal)   Resp 18   Ht 5' 11\" (1.803 m)   Wt 173 lb 3.2 oz (78.6 kg)   SpO2 98%   BMI 24.16 kg/m²  Estimated body mass index is 24.16 kg/m² as calculated from the following:    Height as of this encounter: 5' 11\" (1.803 m).    Weight as of this encounter: 173 lb 3.2 oz (78.6 kg).    Medicare Hearing Assessment:   Hearing Screening    Time taken: 7/10/2025  9:15 AM  Screening Method: Whisper Test               Assessment & Plan:   Johnny Rosenberg is a 70 year old male who presents for a Medicare Assessment.     1.  Encounter for annual health examination (Primary)    Assessment & Plan    Outstanding screening and preventive measures:  Screening for colon cancer: Due for 5-year follow-up in November  Pneumococcal immunization: To be obtained from pharmacy    Diabetes mellitus type 2:  No blood glucose log.  Most recent hemoglobin A1c of 7.5%.  Repeat study ordered.  Continue metformin 1 g twice daily and Jardiance 25 mg daily.  No microalbuminuria: Lisinopril reduced from 10 mg daily to 2.5 mg daily in light of mild low diastolic blood pressure with low normal systolic blood pressure without medical management (has not taken lisinopril in several months)    Pure hypercholesterolemia:   Continue atorvastatin 40 mg daily  CMP and lipid panel pending collection  Following with cardiology service    History of intracranial hemorrhage    GERD with esophagitis:  Post evaluation by GI service; due for follow-up  Continue daily PPI therapy    Cervical radiculitis:  Post fusion in 2016  Stable  Continue pregabalin    BPH:  Stable  Continue tamsulosin    Lumbar foraminal stenosis:  Stable and controlled  Taking pregabalin    Primary OA of right knee:  Stable and controlled  Post PT    Genu recurvatum   Stable  Continue use of single-pronged cane for ambulation    The patient indicates understanding of these issues and agrees to the plan.  Reinforced healthy diet, lifestyle, and exercise.      Return in 6 months (on 1/10/2026).     Sim Kevin MD, 7/10/2025     Supplementary Documentation:   General Health:       Health Maintenance   Topic Date Due    Pneumococcal Vaccine: 50+ Years (2 of 2 - PCV) 07/16/2022    COVID-19 Vaccine (4 - 2024-25 season) 09/01/2024    Annual Well Visit  01/01/2025    Diabetes Care: Microalb/Creat Ratio (Annual)  01/01/2025    Diabetes Care A1C  01/27/2025    Colorectal Cancer Screening  11/04/2025    Diabetes Care Dilated Eye Exam  10/12/2099 (Originally 7/16/2019)    Zoster Vaccines (1 of 2) 10/12/2099  (Originally 5/31/2005)    Diabetes Care: GFR  07/27/2025    Influenza Vaccine (1) 10/01/2025    PSA  02/08/2026    Annual Depression Screening  Completed    Fall Risk Screening (Annual)  Completed    Meningococcal B Vaccine  Aged Out    Diabetes Care Foot Exam  Discontinued

## 2025-07-23 ENCOUNTER — TELEPHONE (OUTPATIENT)
Facility: CLINIC | Age: 70
End: 2025-07-23

## 2025-08-02 ENCOUNTER — LAB ENCOUNTER (OUTPATIENT)
Dept: LAB | Age: 70
End: 2025-08-02
Attending: INTERNAL MEDICINE

## 2025-08-02 DIAGNOSIS — Z12.5 PROSTATE CANCER SCREENING: ICD-10-CM

## 2025-08-02 DIAGNOSIS — Z00.00 ENCOUNTER FOR ANNUAL HEALTH EXAMINATION: ICD-10-CM

## 2025-08-02 DIAGNOSIS — E11.22 TYPE 2 DIABETES MELLITUS WITH STAGE 3 CHRONIC KIDNEY DISEASE, WITHOUT LONG-TERM CURRENT USE OF INSULIN, UNSPECIFIED WHETHER STAGE 3A OR 3B CKD (HCC): ICD-10-CM

## 2025-08-02 DIAGNOSIS — N18.30 TYPE 2 DIABETES MELLITUS WITH STAGE 3 CHRONIC KIDNEY DISEASE, WITHOUT LONG-TERM CURRENT USE OF INSULIN, UNSPECIFIED WHETHER STAGE 3A OR 3B CKD (HCC): ICD-10-CM

## 2025-08-02 LAB
ALBUMIN SERPL-MCNC: 4.8 G/DL (ref 3.2–4.8)
ALBUMIN/GLOB SERPL: 1.7 (ref 1–2)
ALP LIVER SERPL-CCNC: 85 U/L (ref 45–117)
ALT SERPL-CCNC: 32 U/L (ref 10–49)
ANION GAP SERPL CALC-SCNC: 9 MMOL/L (ref 0–18)
AST SERPL-CCNC: 29 U/L (ref ?–34)
BILIRUB SERPL-MCNC: 1.3 MG/DL (ref 0.2–1.1)
BUN BLD-MCNC: 18 MG/DL (ref 9–23)
CALCIUM BLD-MCNC: 10 MG/DL (ref 8.7–10.6)
CHLORIDE SERPL-SCNC: 101 MMOL/L (ref 98–112)
CHOLEST SERPL-MCNC: 128 MG/DL (ref ?–200)
CO2 SERPL-SCNC: 26 MMOL/L (ref 21–32)
COMPLEXED PSA SERPL-MCNC: 2.27 NG/ML (ref ?–4)
CREAT BLD-MCNC: 1.4 MG/DL (ref 0.7–1.3)
CREAT UR-SCNC: 80.4 MG/DL
EGFRCR SERPLBLD CKD-EPI 2021: 54 ML/MIN/1.73M2 (ref 60–?)
EST. AVERAGE GLUCOSE BLD GHB EST-MCNC: 180 MG/DL (ref 68–126)
FASTING PATIENT LIPID ANSWER: YES
FASTING STATUS PATIENT QL REPORTED: YES
GLOBULIN PLAS-MCNC: 2.9 G/DL (ref 2–3.5)
GLUCOSE BLD-MCNC: 158 MG/DL (ref 70–99)
HBA1C MFR BLD: 7.9 % (ref ?–5.7)
HDLC SERPL-MCNC: 39 MG/DL (ref 40–59)
LDLC SERPL CALC-MCNC: 68 MG/DL (ref ?–100)
MICROALBUMIN UR-MCNC: <0.3 MG/DL
NONHDLC SERPL-MCNC: 89 MG/DL (ref ?–130)
OSMOLALITY SERPL CALC.SUM OF ELEC: 287 MOSM/KG (ref 275–295)
POTASSIUM SERPL-SCNC: 4.8 MMOL/L (ref 3.5–5.1)
PROT SERPL-MCNC: 7.7 G/DL (ref 5.7–8.2)
SODIUM SERPL-SCNC: 136 MMOL/L (ref 136–145)
TRIGL SERPL-MCNC: 113 MG/DL (ref 30–149)
VLDLC SERPL CALC-MCNC: 17 MG/DL (ref 0–30)

## 2025-08-02 PROCEDURE — 82043 UR ALBUMIN QUANTITATIVE: CPT

## 2025-08-02 PROCEDURE — 80053 COMPREHEN METABOLIC PANEL: CPT

## 2025-08-02 PROCEDURE — 36415 COLL VENOUS BLD VENIPUNCTURE: CPT

## 2025-08-02 PROCEDURE — 80061 LIPID PANEL: CPT

## 2025-08-02 PROCEDURE — 82570 ASSAY OF URINE CREATININE: CPT

## 2025-08-02 PROCEDURE — 83036 HEMOGLOBIN GLYCOSYLATED A1C: CPT

## (undated) DIAGNOSIS — Z20.822 ENCOUNTER FOR SCREENING LABORATORY TESTING FOR COVID-19 VIRUS IN ASYMPTOMATIC PATIENT: Primary | ICD-10-CM

## (undated) NOTE — LETTER
8/30/2018          To Whom It May Concern:    Delmi Singh is currently under my medical care and may not return to work at this time. He needs physical therapy and assessment by an orthopedic specialist before he return to work.   If you require

## (undated) NOTE — LETTER
[facility] SURGERY SCHEDULING FORM   [address] [place], [place]   [phone] (scheduling phone) [phone] (scheduling fax)     PATIENT INFORMATION   Last Name:      Claudia Moore      First Name:    Barbie Albert Allergies: Patient has no known allergies.          Completed by:   Homa Collins      Date:   11/26/2018

## (undated) NOTE — LETTER
10/8/2018          To Whom It May Concern:    Ceferino Bailey is currently under my medical care, continuing to have physical therapy and may not return to work at this time. If you require additional information please contact our office.         [facility]

## (undated) NOTE — LETTER
10/23/18          Arsalan Quintero  :  1955      To Whom It May Concern: This patient was seen in our office on 10/23/18 .   Work status:  Remain off work for 4 weeks until re-evaluated at follow up appointment        If this office may be of

## (undated) NOTE — LETTER
10/19/2018          To Whom It May Concern:    Ferny Badillo is currently under my medical care and may not return to work until he will be evaluated by Pain  Management . If you require additional information please contact our office.         Ernestina Encarnacion

## (undated) NOTE — LETTER
[facility] SURGERY SCHEDULING FORM   [address] [place] [address] [place] [address]   [phone] (scheduling phone) [phone] (scheduling fax)     PATIENT INFORMATION   Last Name:      Sheila Wu      First Name:    [facility] [x]  Yes  []  No or using our own   Allergies: Patient has no known allergies.          Completed by:    Michael [place]ville      Date:    8/12/2019

## (undated) NOTE — LETTER
Dear Dr. Kailee Duran    This letter is to inform you that Vidya Correa has been attending Physical Therapy with me. See below for my most recent plan of care.      Physical Therapy Progress Note    Assessment:  Patient seen for a total of 6 P

## (undated) NOTE — LETTER
AUTHORIZATION FOR SURGICAL OPERATION OR OTHER PROCEDURE    1. I hereby authorize Dr. Jan Eden and the [facility] staff assigned to my case to perform the following operation and/or procedure at the [facility]:     RIGHT SI joint injection    2.   My physi Responsible person                          []  Spouse  In case of minor or                    [] Other  _____________   Incompetent name:  __________________________________________________                               (please print)      _____________

## (undated) NOTE — LETTER
[facility] SURGERY SCHEDULING FORM   [address] [facility]   [phone] (scheduling phone) [phone] (scheduling fax)     PATIENT INFORMATION   Last Name:      Zahra Shipman      First Name:    Parag Briscoe Anesthesia Type SA Needed Other Special Requests/Information   [x]  Local  []  IVCS  []  MAC  []  General   []  Choice  []  Sera  []  Regional/              []  Spinal  []  No Anesthesia   [x]  Yes  []  No or using our own   Allergies: Patient has no known

## (undated) NOTE — LETTER
[facility]. Notifier: [employer]   LM. Patient Name: Eloina Young. Identification Number: [id number]      Advance Beneficiary Notice of Noncoverage (ABN)  NOTE:  If Medicare doesn’t pay for D. RIGHT SI joint injectionbelow, you may have to pay. ? OPTION 2. I want the D. RIGHT SI joint injectionlisted above, but do not bill Medicare. You may ask to be paid now as I am responsible for payment. I cannot appeal if Medicare is not billed. ? OPTION 3. I don’t want the D.listed above.  I understand wi

## (undated) NOTE — LETTER
9/11/2018          To Whom It May Concern:    Marly Meza is currently under my medical care and may not return to work for the next 4 weeks. If you require additional information please contact our office. Sincerely,    Jules Michaels.  [place]